# Patient Record
Sex: MALE | Race: WHITE | NOT HISPANIC OR LATINO | Employment: FULL TIME | URBAN - METROPOLITAN AREA
[De-identification: names, ages, dates, MRNs, and addresses within clinical notes are randomized per-mention and may not be internally consistent; named-entity substitution may affect disease eponyms.]

---

## 2021-12-26 ENCOUNTER — APPOINTMENT (OUTPATIENT)
Dept: GENERAL RADIOLOGY | Facility: CLINIC | Age: 51
End: 2021-12-26
Attending: PHYSICIAN ASSISTANT
Payer: COMMERCIAL

## 2021-12-26 ENCOUNTER — HOSPITAL ENCOUNTER (INPATIENT)
Facility: CLINIC | Age: 51
LOS: 3 days | Discharge: HOME HEALTH | End: 2021-12-29
Attending: ORTHOPAEDIC SURGERY | Admitting: ORTHOPAEDIC SURGERY
Payer: COMMERCIAL

## 2021-12-26 DIAGNOSIS — T84.50XD INFECTION OF PROSTHETIC JOINT, SUBSEQUENT ENCOUNTER: ICD-10-CM

## 2021-12-26 DIAGNOSIS — B95.61 STAPHYLOCOCCUS AUREUS BACTEREMIA: ICD-10-CM

## 2021-12-26 DIAGNOSIS — T81.42XA INFECTION OF DEEP INCISIONAL SURGICAL SITE AFTER PROCEDURE, INITIAL ENCOUNTER: Primary | ICD-10-CM

## 2021-12-26 DIAGNOSIS — R78.81 BACTEREMIA: ICD-10-CM

## 2021-12-26 DIAGNOSIS — R78.81 STAPHYLOCOCCUS AUREUS BACTEREMIA: ICD-10-CM

## 2021-12-26 LAB
ANION GAP SERPL CALCULATED.3IONS-SCNC: 4 MMOL/L (ref 3–14)
BUN SERPL-MCNC: 14 MG/DL (ref 7–30)
CALCIUM SERPL-MCNC: 8.5 MG/DL (ref 8.5–10.1)
CHLORIDE BLD-SCNC: 106 MMOL/L (ref 94–109)
CO2 SERPL-SCNC: 25 MMOL/L (ref 20–32)
CREAT SERPL-MCNC: 0.87 MG/DL (ref 0.66–1.25)
CRP SERPL-MCNC: 154 MG/L (ref 0–8)
ERYTHROCYTE [DISTWIDTH] IN BLOOD BY AUTOMATED COUNT: 12.5 % (ref 10–15)
ERYTHROCYTE [SEDIMENTATION RATE] IN BLOOD BY WESTERGREN METHOD: 67 MM/HR (ref 0–20)
GFR SERPL CREATININE-BSD FRML MDRD: >90 ML/MIN/1.73M2
GLUCOSE BLD-MCNC: 112 MG/DL (ref 70–99)
HCT VFR BLD AUTO: 36.1 % (ref 40–53)
HGB BLD-MCNC: 11.9 G/DL (ref 13.3–17.7)
INR PPP: 1.14 (ref 0.85–1.15)
LACTATE SERPL-SCNC: 1.1 MMOL/L (ref 0.7–2)
MCH RBC QN AUTO: 29.2 PG (ref 26.5–33)
MCHC RBC AUTO-ENTMCNC: 33 G/DL (ref 31.5–36.5)
MCV RBC AUTO: 89 FL (ref 78–100)
PLATELET # BLD AUTO: 226 10E3/UL (ref 150–450)
POTASSIUM BLD-SCNC: 4 MMOL/L (ref 3.4–5.3)
RBC # BLD AUTO: 4.07 10E6/UL (ref 4.4–5.9)
SARS-COV-2 RNA RESP QL NAA+PROBE: NEGATIVE
SODIUM SERPL-SCNC: 135 MMOL/L (ref 133–144)
WBC # BLD AUTO: 10.6 10E3/UL (ref 4–11)

## 2021-12-26 PROCEDURE — 71045 X-RAY EXAM CHEST 1 VIEW: CPT

## 2021-12-26 PROCEDURE — 85652 RBC SED RATE AUTOMATED: CPT | Performed by: PHYSICIAN ASSISTANT

## 2021-12-26 PROCEDURE — 85027 COMPLETE CBC AUTOMATED: CPT | Performed by: PHYSICIAN ASSISTANT

## 2021-12-26 PROCEDURE — 120N000001 HC R&B MED SURG/OB

## 2021-12-26 PROCEDURE — 87040 BLOOD CULTURE FOR BACTERIA: CPT | Performed by: PHYSICIAN ASSISTANT

## 2021-12-26 PROCEDURE — 250N000011 HC RX IP 250 OP 636: Performed by: ORTHOPAEDIC SURGERY

## 2021-12-26 PROCEDURE — 86140 C-REACTIVE PROTEIN: CPT | Performed by: PHYSICIAN ASSISTANT

## 2021-12-26 PROCEDURE — 99207 PR CONSULT E&M CHANGED TO SUBSEQUENT LEVEL: CPT | Performed by: PHYSICIAN ASSISTANT

## 2021-12-26 PROCEDURE — 80048 BASIC METABOLIC PNL TOTAL CA: CPT | Performed by: PHYSICIAN ASSISTANT

## 2021-12-26 PROCEDURE — 83605 ASSAY OF LACTIC ACID: CPT | Performed by: PHYSICIAN ASSISTANT

## 2021-12-26 PROCEDURE — 85610 PROTHROMBIN TIME: CPT | Performed by: PHYSICIAN ASSISTANT

## 2021-12-26 PROCEDURE — 99232 SBSQ HOSP IP/OBS MODERATE 35: CPT | Performed by: PHYSICIAN ASSISTANT

## 2021-12-26 PROCEDURE — 258N000003 HC RX IP 258 OP 636: Performed by: ORTHOPAEDIC SURGERY

## 2021-12-26 PROCEDURE — 87635 SARS-COV-2 COVID-19 AMP PRB: CPT | Performed by: PHYSICIAN ASSISTANT

## 2021-12-26 PROCEDURE — 93005 ELECTROCARDIOGRAM TRACING: CPT

## 2021-12-26 PROCEDURE — 36415 COLL VENOUS BLD VENIPUNCTURE: CPT | Performed by: PHYSICIAN ASSISTANT

## 2021-12-26 PROCEDURE — 250N000013 HC RX MED GY IP 250 OP 250 PS 637: Performed by: ORTHOPAEDIC SURGERY

## 2021-12-26 RX ORDER — ONDANSETRON 4 MG/1
4 TABLET, ORALLY DISINTEGRATING ORAL EVERY 6 HOURS PRN
Status: DISCONTINUED | OUTPATIENT
Start: 2021-12-26 | End: 2021-12-27

## 2021-12-26 RX ORDER — ONDANSETRON 2 MG/ML
4 INJECTION INTRAMUSCULAR; INTRAVENOUS EVERY 6 HOURS PRN
Status: DISCONTINUED | OUTPATIENT
Start: 2021-12-26 | End: 2021-12-27

## 2021-12-26 RX ORDER — CEFAZOLIN SODIUM 1 G/3ML
1 INJECTION, POWDER, FOR SOLUTION INTRAMUSCULAR; INTRAVENOUS EVERY 8 HOURS
Status: DISCONTINUED | OUTPATIENT
Start: 2021-12-26 | End: 2021-12-26

## 2021-12-26 RX ORDER — PROCHLORPERAZINE MALEATE 10 MG
10 TABLET ORAL EVERY 6 HOURS PRN
Status: DISCONTINUED | OUTPATIENT
Start: 2021-12-26 | End: 2021-12-27

## 2021-12-26 RX ORDER — ATORVASTATIN CALCIUM 40 MG/1
40 TABLET, FILM COATED ORAL DAILY
COMMUNITY
Start: 2021-02-17

## 2021-12-26 RX ORDER — ACETAMINOPHEN 325 MG/1
650 TABLET ORAL EVERY 4 HOURS PRN
Status: DISCONTINUED | OUTPATIENT
Start: 2021-12-26 | End: 2021-12-27

## 2021-12-26 RX ORDER — CHLORAL HYDRATE 500 MG
2000 CAPSULE ORAL DAILY
COMMUNITY

## 2021-12-26 RX ORDER — ASPIRIN 81 MG/1
325 TABLET, CHEWABLE ORAL DAILY
Status: ON HOLD | COMMUNITY
End: 2021-12-29

## 2021-12-26 RX ORDER — MELOXICAM 15 MG/1
15 TABLET ORAL DAILY
Status: ON HOLD | COMMUNITY
Start: 2021-04-19 | End: 2021-12-26

## 2021-12-26 RX ORDER — PROCHLORPERAZINE 25 MG
25 SUPPOSITORY, RECTAL RECTAL EVERY 12 HOURS PRN
Status: DISCONTINUED | OUTPATIENT
Start: 2021-12-26 | End: 2021-12-27

## 2021-12-26 RX ORDER — CEFUROXIME AXETIL 500 MG/1
500 TABLET ORAL 2 TIMES DAILY
Status: ON HOLD | COMMUNITY
Start: 2021-12-25 | End: 2021-12-29

## 2021-12-26 RX ADMIN — VANCOMYCIN HYDROCHLORIDE 2500 MG: 5 INJECTION, POWDER, LYOPHILIZED, FOR SOLUTION INTRAVENOUS at 22:42

## 2021-12-26 RX ADMIN — ACETAMINOPHEN 650 MG: 325 TABLET, FILM COATED ORAL at 19:23

## 2021-12-26 ASSESSMENT — ACTIVITIES OF DAILY LIVING (ADL)
ADLS_ACUITY_SCORE: 12

## 2021-12-26 ASSESSMENT — MIFFLIN-ST. JEOR: SCORE: 2227.5

## 2021-12-26 NOTE — PROGRESS NOTES
The patient is a 51-year-old male who is 6 weeks status post right total hip arthroplasty with Dr. Eric Wang.   He has been recovering exceptionally well up until 12/24.  That day he developed fevers and chills, as well as rigors.  This persisted into the night, and he noted that when he tried to get up in the middle the night he had new onset right hip pain (starting several hours after onset of fevers and chills).  The symptoms brought him to the emergency department at [facility] yesterday.  At that time labs were done, with the following results reported to me via phone:    Negative Covid test  White blood cell count 15  CRP 4  CT right hip: No significant effusion present, no fractures evident    No primary site of infection was determined while in the ED.    The patient was given a dose of IV antibiotics while in the emergency department, and was discharged on an empiric oral cephalosporin with plans for close follow-up with Dr. Wang's team early this coming week.    Blood cultures had been drawn as well from 2 different sites while the patient was in the emergency department.  I was contacted this afternoon and notified that both of those blood cultures had turned positive today with gram-positive cocci, speciation pending.    I called and discussed with the patient.  He noted that since the antibiotics has been started his fever had been staying below 100  F, and the hip was not as painful.  However he still noted that there was pain present in the hip, which had not been the case prior to the onset of these fevers and chills on 12/24.    Given the concern that the patient may have seeded his right total hip arthroplasty from bacteremia of unknown source, I advised the patient it would be best to bring him in for IV antibiotics and a possible return to the operating room to washout the hip to prevent biofilm formation.  The patient agreed with this, and a bed was identified at [place]  [facility].  The patient was subsequently admitted for this.    I spoke with Dr. Eric Wang this evening, who is planning to see the patient in the early hours tomorrow morning.  He has asked for an interventional radiology aspiration of the right hip, with the cell count results dictating the need for return to the operating room for irrigation and debridement with possible head and liner exchange.    We will ask for hospitalist consult this evening to confirm clearance for the operating room, as well as advised on empiric IV antibiotic regimen to be initiated this evening.      Jimi Edwards MD  [facility]

## 2021-12-27 ENCOUNTER — HOME INFUSION (PRE-WILLOW HOME INFUSION) (OUTPATIENT)
Dept: PHARMACY | Facility: CLINIC | Age: 51
End: 2021-12-27
Payer: COMMERCIAL

## 2021-12-27 ENCOUNTER — APPOINTMENT (OUTPATIENT)
Dept: GENERAL RADIOLOGY | Facility: CLINIC | Age: 51
End: 2021-12-27
Attending: ORTHOPAEDIC SURGERY
Payer: COMMERCIAL

## 2021-12-27 ENCOUNTER — ANESTHESIA (OUTPATIENT)
Dept: SURGERY | Facility: CLINIC | Age: 51
End: 2021-12-27
Payer: COMMERCIAL

## 2021-12-27 ENCOUNTER — ANESTHESIA EVENT (OUTPATIENT)
Dept: SURGERY | Facility: CLINIC | Age: 51
End: 2021-12-27
Payer: COMMERCIAL

## 2021-12-27 LAB
ABO/RH(D): NORMAL
ANTIBODY SCREEN: NEGATIVE
CREAT SERPL-MCNC: 0.86 MG/DL (ref 0.66–1.25)
GFR SERPL CREATININE-BSD FRML MDRD: >90 ML/MIN/1.73M2
SPECIMEN EXPIRATION DATE: NORMAL

## 2021-12-27 PROCEDURE — 87070 CULTURE OTHR SPECIMN AEROBIC: CPT | Performed by: ORTHOPAEDIC SURGERY

## 2021-12-27 PROCEDURE — 999N000141 HC STATISTIC PRE-PROCEDURE NURSING ASSESSMENT: Performed by: ORTHOPAEDIC SURGERY

## 2021-12-27 PROCEDURE — 999N000179 XR SURGERY CARM FLUORO LESS THAN 5 MIN W STILLS

## 2021-12-27 PROCEDURE — 250N000013 HC RX MED GY IP 250 OP 250 PS 637: Performed by: STUDENT IN AN ORGANIZED HEALTH CARE EDUCATION/TRAINING PROGRAM

## 2021-12-27 PROCEDURE — 0SP909Z REMOVAL OF LINER FROM RIGHT HIP JOINT, OPEN APPROACH: ICD-10-PCS | Performed by: ORTHOPAEDIC SURGERY

## 2021-12-27 PROCEDURE — C1776 JOINT DEVICE (IMPLANTABLE): HCPCS | Performed by: ORTHOPAEDIC SURGERY

## 2021-12-27 PROCEDURE — 999N000063 XR PELVIS AD HIP PORTABLE RIGHT 1 VIEW

## 2021-12-27 PROCEDURE — 272N000001 HC OR GENERAL SUPPLY STERILE: Performed by: ORTHOPAEDIC SURGERY

## 2021-12-27 PROCEDURE — 250N000013 HC RX MED GY IP 250 OP 250 PS 637: Performed by: ORTHOPAEDIC SURGERY

## 2021-12-27 PROCEDURE — 0SPR0JZ REMOVAL OF SYNTHETIC SUBSTITUTE FROM RIGHT HIP JOINT, FEMORAL SURFACE, OPEN APPROACH: ICD-10-PCS | Performed by: ORTHOPAEDIC SURGERY

## 2021-12-27 PROCEDURE — 258N000001 HC RX 258: Performed by: ORTHOPAEDIC SURGERY

## 2021-12-27 PROCEDURE — 0SRR03A REPLACEMENT OF RIGHT HIP JOINT, FEMORAL SURFACE WITH CERAMIC SYNTHETIC SUBSTITUTE, UNCEMENTED, OPEN APPROACH: ICD-10-PCS | Performed by: ORTHOPAEDIC SURGERY

## 2021-12-27 PROCEDURE — 370N000017 HC ANESTHESIA TECHNICAL FEE, PER MIN: Performed by: ORTHOPAEDIC SURGERY

## 2021-12-27 PROCEDURE — 250N000009 HC RX 250: Performed by: NURSE ANESTHETIST, CERTIFIED REGISTERED

## 2021-12-27 PROCEDURE — 258N000003 HC RX IP 258 OP 636: Performed by: ORTHOPAEDIC SURGERY

## 2021-12-27 PROCEDURE — 250N000011 HC RX IP 250 OP 636: Performed by: STUDENT IN AN ORGANIZED HEALTH CARE EDUCATION/TRAINING PROGRAM

## 2021-12-27 PROCEDURE — 77002 NEEDLE LOCALIZATION BY XRAY: CPT

## 2021-12-27 PROCEDURE — 89050 BODY FLUID CELL COUNT: CPT | Performed by: ORTHOPAEDIC SURGERY

## 2021-12-27 PROCEDURE — 250N000009 HC RX 250: Performed by: ORTHOPAEDIC SURGERY

## 2021-12-27 PROCEDURE — 0S993ZZ DRAINAGE OF RIGHT HIP JOINT, PERCUTANEOUS APPROACH: ICD-10-PCS | Performed by: RADIOLOGY

## 2021-12-27 PROCEDURE — 360N000085 HC SURGERY LEVEL 5 W/ FLUORO, PER MIN: Performed by: ORTHOPAEDIC SURGERY

## 2021-12-27 PROCEDURE — 250N000011 HC RX IP 250 OP 636: Performed by: NURSE ANESTHETIST, CERTIFIED REGISTERED

## 2021-12-27 PROCEDURE — 250N000011 HC RX IP 250 OP 636: Performed by: ORTHOPAEDIC SURGERY

## 2021-12-27 PROCEDURE — 82565 ASSAY OF CREATININE: CPT | Performed by: STUDENT IN AN ORGANIZED HEALTH CARE EDUCATION/TRAINING PROGRAM

## 2021-12-27 PROCEDURE — 710N000009 HC RECOVERY PHASE 1, LEVEL 1, PER MIN: Performed by: ORTHOPAEDIC SURGERY

## 2021-12-27 PROCEDURE — 36415 COLL VENOUS BLD VENIPUNCTURE: CPT | Performed by: STUDENT IN AN ORGANIZED HEALTH CARE EDUCATION/TRAINING PROGRAM

## 2021-12-27 PROCEDURE — 250N000025 HC SEVOFLURANE, PER MIN: Performed by: ORTHOPAEDIC SURGERY

## 2021-12-27 PROCEDURE — 86901 BLOOD TYPING SEROLOGIC RH(D): CPT | Performed by: ANESTHESIOLOGY

## 2021-12-27 PROCEDURE — 87075 CULTR BACTERIA EXCEPT BLOOD: CPT | Performed by: ORTHOPAEDIC SURGERY

## 2021-12-27 PROCEDURE — 0SUA09Z SUPPLEMENT RIGHT HIP JOINT, ACETABULAR SURFACE WITH LINER, OPEN APPROACH: ICD-10-PCS | Performed by: ORTHOPAEDIC SURGERY

## 2021-12-27 PROCEDURE — 86850 RBC ANTIBODY SCREEN: CPT | Performed by: ANESTHESIOLOGY

## 2021-12-27 PROCEDURE — 250N000013 HC RX MED GY IP 250 OP 250 PS 637: Performed by: INTERNAL MEDICINE

## 2021-12-27 PROCEDURE — 99232 SBSQ HOSP IP/OBS MODERATE 35: CPT | Performed by: INTERNAL MEDICINE

## 2021-12-27 PROCEDURE — 258N000003 HC RX IP 258 OP 636: Performed by: ANESTHESIOLOGY

## 2021-12-27 PROCEDURE — 120N000001 HC R&B MED SURG/OB

## 2021-12-27 DEVICE — PINNACLE HIP SOLUTIONS ALTRX POLYETHYLENE ACETABULAR LINER NEUTRAL 36MM ID 62MM OD
Type: IMPLANTABLE DEVICE | Site: HIP | Status: NON-FUNCTIONAL
Brand: PINNACLE ALTRX
Removed: 2022-09-19

## 2021-12-27 DEVICE — BIOLOX DELTA TS CERAMIC FEMORAL HEAD 12/14 TAPER REVISION DIAMETER 36MM +1.5
Type: IMPLANTABLE DEVICE | Site: HIP | Status: NON-FUNCTIONAL
Brand: BIOLOX DELTA
Removed: 2022-09-19

## 2021-12-27 RX ORDER — PROCHLORPERAZINE MALEATE 10 MG
10 TABLET ORAL EVERY 6 HOURS PRN
Status: DISCONTINUED | OUTPATIENT
Start: 2021-12-27 | End: 2021-12-29 | Stop reason: HOSPADM

## 2021-12-27 RX ORDER — OXYCODONE HYDROCHLORIDE 5 MG/1
10 TABLET ORAL EVERY 4 HOURS PRN
Status: DISCONTINUED | OUTPATIENT
Start: 2021-12-27 | End: 2021-12-29 | Stop reason: HOSPADM

## 2021-12-27 RX ORDER — MAGNESIUM HYDROXIDE 1200 MG/15ML
LIQUID ORAL PRN
Status: DISCONTINUED | OUTPATIENT
Start: 2021-12-27 | End: 2021-12-27 | Stop reason: HOSPADM

## 2021-12-27 RX ORDER — LIDOCAINE HYDROCHLORIDE 20 MG/ML
INJECTION, SOLUTION INFILTRATION; PERINEURAL PRN
Status: DISCONTINUED | OUTPATIENT
Start: 2021-12-27 | End: 2021-12-27

## 2021-12-27 RX ORDER — HYDROMORPHONE HCL IN WATER/PF 6 MG/30 ML
0.4 PATIENT CONTROLLED ANALGESIA SYRINGE INTRAVENOUS
Status: DISCONTINUED | OUTPATIENT
Start: 2021-12-27 | End: 2021-12-29 | Stop reason: HOSPADM

## 2021-12-27 RX ORDER — MEPERIDINE HYDROCHLORIDE 25 MG/ML
12.5 INJECTION INTRAMUSCULAR; INTRAVENOUS; SUBCUTANEOUS EVERY 5 MIN PRN
Status: DISCONTINUED | OUTPATIENT
Start: 2021-12-27 | End: 2021-12-27

## 2021-12-27 RX ORDER — LIDOCAINE 40 MG/G
CREAM TOPICAL
Status: DISCONTINUED | OUTPATIENT
Start: 2021-12-27 | End: 2021-12-29 | Stop reason: HOSPADM

## 2021-12-27 RX ORDER — SENNOSIDES 8.6 MG
1-2 TABLET ORAL 2 TIMES DAILY PRN
Status: DISCONTINUED | OUTPATIENT
Start: 2021-12-27 | End: 2021-12-27

## 2021-12-27 RX ORDER — NALOXONE HYDROCHLORIDE 0.4 MG/ML
0.2 INJECTION, SOLUTION INTRAMUSCULAR; INTRAVENOUS; SUBCUTANEOUS
Status: DISCONTINUED | OUTPATIENT
Start: 2021-12-27 | End: 2021-12-29 | Stop reason: HOSPADM

## 2021-12-27 RX ORDER — BISACODYL 10 MG
10 SUPPOSITORY, RECTAL RECTAL DAILY PRN
Status: DISCONTINUED | OUTPATIENT
Start: 2021-12-27 | End: 2021-12-27

## 2021-12-27 RX ORDER — CEFAZOLIN SODIUM IN 0.9 % NACL 3 G/100 ML
3 INTRAVENOUS SOLUTION, PIGGYBACK (ML) INTRAVENOUS
Status: COMPLETED | OUTPATIENT
Start: 2021-12-27 | End: 2021-12-27

## 2021-12-27 RX ORDER — TRANEXAMIC ACID 10 MG/ML
1 INJECTION, SOLUTION INTRAVENOUS ONCE
Status: COMPLETED | OUTPATIENT
Start: 2021-12-27 | End: 2021-12-27

## 2021-12-27 RX ORDER — ONDANSETRON 2 MG/ML
4 INJECTION INTRAMUSCULAR; INTRAVENOUS EVERY 30 MIN PRN
Status: DISCONTINUED | OUTPATIENT
Start: 2021-12-27 | End: 2021-12-27

## 2021-12-27 RX ORDER — SODIUM CHLORIDE, SODIUM LACTATE, POTASSIUM CHLORIDE, CALCIUM CHLORIDE 600; 310; 30; 20 MG/100ML; MG/100ML; MG/100ML; MG/100ML
INJECTION, SOLUTION INTRAVENOUS CONTINUOUS
Status: DISCONTINUED | OUTPATIENT
Start: 2021-12-27 | End: 2021-12-27

## 2021-12-27 RX ORDER — ONDANSETRON 4 MG/1
4 TABLET, ORALLY DISINTEGRATING ORAL EVERY 6 HOURS PRN
Status: DISCONTINUED | OUTPATIENT
Start: 2021-12-27 | End: 2021-12-29 | Stop reason: HOSPADM

## 2021-12-27 RX ORDER — HYDROMORPHONE HCL IN WATER/PF 6 MG/30 ML
0.2 PATIENT CONTROLLED ANALGESIA SYRINGE INTRAVENOUS
Status: DISCONTINUED | OUTPATIENT
Start: 2021-12-27 | End: 2021-12-29 | Stop reason: HOSPADM

## 2021-12-27 RX ORDER — NALOXONE HYDROCHLORIDE 0.4 MG/ML
0.4 INJECTION, SOLUTION INTRAMUSCULAR; INTRAVENOUS; SUBCUTANEOUS
Status: DISCONTINUED | OUTPATIENT
Start: 2021-12-27 | End: 2021-12-29 | Stop reason: HOSPADM

## 2021-12-27 RX ORDER — POLYETHYLENE GLYCOL 3350 17 G/17G
17 POWDER, FOR SOLUTION ORAL 2 TIMES DAILY PRN
Status: DISCONTINUED | OUTPATIENT
Start: 2021-12-27 | End: 2021-12-27

## 2021-12-27 RX ORDER — ACETAMINOPHEN 325 MG/1
975 TABLET ORAL EVERY 8 HOURS SCHEDULED
Status: DISCONTINUED | OUTPATIENT
Start: 2021-12-27 | End: 2021-12-29 | Stop reason: HOSPADM

## 2021-12-27 RX ORDER — KETOROLAC TROMETHAMINE 15 MG/ML
15 INJECTION, SOLUTION INTRAMUSCULAR; INTRAVENOUS EVERY 6 HOURS
Status: COMPLETED | OUTPATIENT
Start: 2021-12-28 | End: 2021-12-28

## 2021-12-27 RX ORDER — FENTANYL CITRATE 0.05 MG/ML
50 INJECTION, SOLUTION INTRAMUSCULAR; INTRAVENOUS EVERY 5 MIN PRN
Status: DISCONTINUED | OUTPATIENT
Start: 2021-12-27 | End: 2021-12-27

## 2021-12-27 RX ORDER — BISACODYL 10 MG
10 SUPPOSITORY, RECTAL RECTAL DAILY PRN
Status: DISCONTINUED | OUTPATIENT
Start: 2021-12-27 | End: 2021-12-29 | Stop reason: HOSPADM

## 2021-12-27 RX ORDER — ALBUTEROL SULFATE 0.83 MG/ML
2.5 SOLUTION RESPIRATORY (INHALATION) EVERY 4 HOURS PRN
Status: DISCONTINUED | OUTPATIENT
Start: 2021-12-27 | End: 2021-12-27

## 2021-12-27 RX ORDER — GLYCOPYRROLATE 0.2 MG/ML
INJECTION, SOLUTION INTRAMUSCULAR; INTRAVENOUS PRN
Status: DISCONTINUED | OUTPATIENT
Start: 2021-12-27 | End: 2021-12-27

## 2021-12-27 RX ORDER — NEOSTIGMINE METHYLSULFATE 1 MG/ML
VIAL (ML) INJECTION PRN
Status: DISCONTINUED | OUTPATIENT
Start: 2021-12-27 | End: 2021-12-27

## 2021-12-27 RX ORDER — FENTANYL CITRATE 50 UG/ML
INJECTION, SOLUTION INTRAMUSCULAR; INTRAVENOUS PRN
Status: DISCONTINUED | OUTPATIENT
Start: 2021-12-27 | End: 2021-12-27

## 2021-12-27 RX ORDER — VANCOMYCIN HYDROCHLORIDE 1 G/20ML
INJECTION, POWDER, LYOPHILIZED, FOR SOLUTION INTRAVENOUS PRN
Status: DISCONTINUED | OUTPATIENT
Start: 2021-12-27 | End: 2021-12-27 | Stop reason: HOSPADM

## 2021-12-27 RX ORDER — PROPOFOL 10 MG/ML
INJECTION, EMULSION INTRAVENOUS PRN
Status: DISCONTINUED | OUTPATIENT
Start: 2021-12-27 | End: 2021-12-27

## 2021-12-27 RX ORDER — HYDROMORPHONE HCL IN WATER/PF 6 MG/30 ML
0.4 PATIENT CONTROLLED ANALGESIA SYRINGE INTRAVENOUS EVERY 5 MIN PRN
Status: DISCONTINUED | OUTPATIENT
Start: 2021-12-27 | End: 2021-12-27

## 2021-12-27 RX ORDER — ONDANSETRON 2 MG/ML
4 INJECTION INTRAMUSCULAR; INTRAVENOUS
Status: DISCONTINUED | OUTPATIENT
Start: 2021-12-27 | End: 2021-12-27 | Stop reason: HOSPADM

## 2021-12-27 RX ORDER — ACETAMINOPHEN 325 MG/1
650 TABLET ORAL EVERY 4 HOURS PRN
Status: DISCONTINUED | OUTPATIENT
Start: 2021-12-30 | End: 2021-12-29 | Stop reason: HOSPADM

## 2021-12-27 RX ORDER — TRANEXAMIC ACID 650 MG/1
1950 TABLET ORAL ONCE
Status: DISCONTINUED | OUTPATIENT
Start: 2021-12-27 | End: 2021-12-27 | Stop reason: DRUGHIGH

## 2021-12-27 RX ORDER — OXYCODONE HYDROCHLORIDE 5 MG/1
5 TABLET ORAL EVERY 4 HOURS PRN
Status: DISCONTINUED | OUTPATIENT
Start: 2021-12-27 | End: 2021-12-29 | Stop reason: HOSPADM

## 2021-12-27 RX ORDER — OXYCODONE HYDROCHLORIDE 5 MG/1
5 TABLET ORAL EVERY 4 HOURS PRN
Status: DISCONTINUED | OUTPATIENT
Start: 2021-12-27 | End: 2021-12-27

## 2021-12-27 RX ORDER — ONDANSETRON 2 MG/ML
4 INJECTION INTRAMUSCULAR; INTRAVENOUS EVERY 6 HOURS PRN
Status: DISCONTINUED | OUTPATIENT
Start: 2021-12-27 | End: 2021-12-29 | Stop reason: HOSPADM

## 2021-12-27 RX ORDER — CELECOXIB 200 MG/1
400 CAPSULE ORAL ONCE
Status: COMPLETED | OUTPATIENT
Start: 2021-12-27 | End: 2021-12-27

## 2021-12-27 RX ORDER — CALCIUM CARBONATE 500 MG/1
500 TABLET, CHEWABLE ORAL 4 TIMES DAILY PRN
Status: DISCONTINUED | OUTPATIENT
Start: 2021-12-27 | End: 2021-12-29 | Stop reason: HOSPADM

## 2021-12-27 RX ORDER — HYDROXYZINE HYDROCHLORIDE 25 MG/1
25 TABLET, FILM COATED ORAL EVERY 6 HOURS PRN
Status: DISCONTINUED | OUTPATIENT
Start: 2021-12-27 | End: 2021-12-29 | Stop reason: HOSPADM

## 2021-12-27 RX ORDER — POLYETHYLENE GLYCOL 3350 17 G/17G
17 POWDER, FOR SOLUTION ORAL DAILY
Status: DISCONTINUED | OUTPATIENT
Start: 2021-12-28 | End: 2021-12-29 | Stop reason: HOSPADM

## 2021-12-27 RX ORDER — ONDANSETRON 2 MG/ML
INJECTION INTRAMUSCULAR; INTRAVENOUS PRN
Status: DISCONTINUED | OUTPATIENT
Start: 2021-12-27 | End: 2021-12-27

## 2021-12-27 RX ORDER — ONDANSETRON 4 MG/1
4 TABLET, ORALLY DISINTEGRATING ORAL EVERY 30 MIN PRN
Status: DISCONTINUED | OUTPATIENT
Start: 2021-12-27 | End: 2021-12-27

## 2021-12-27 RX ORDER — SODIUM CHLORIDE, SODIUM LACTATE, POTASSIUM CHLORIDE, CALCIUM CHLORIDE 600; 310; 30; 20 MG/100ML; MG/100ML; MG/100ML; MG/100ML
INJECTION, SOLUTION INTRAVENOUS CONTINUOUS
Status: DISCONTINUED | OUTPATIENT
Start: 2021-12-27 | End: 2021-12-27 | Stop reason: HOSPADM

## 2021-12-27 RX ORDER — ACETAMINOPHEN 325 MG/1
975 TABLET ORAL ONCE
Status: DISCONTINUED | OUTPATIENT
Start: 2021-12-27 | End: 2021-12-27 | Stop reason: HOSPADM

## 2021-12-27 RX ORDER — SODIUM CHLORIDE, SODIUM LACTATE, POTASSIUM CHLORIDE, CALCIUM CHLORIDE 600; 310; 30; 20 MG/100ML; MG/100ML; MG/100ML; MG/100ML
INJECTION, SOLUTION INTRAVENOUS CONTINUOUS
Status: DISCONTINUED | OUTPATIENT
Start: 2021-12-27 | End: 2021-12-29 | Stop reason: HOSPADM

## 2021-12-27 RX ORDER — METHOCARBAMOL 750 MG/1
750 TABLET, FILM COATED ORAL EVERY 6 HOURS PRN
Status: DISCONTINUED | OUTPATIENT
Start: 2021-12-27 | End: 2021-12-29 | Stop reason: HOSPADM

## 2021-12-27 RX ORDER — AMOXICILLIN 250 MG
1 CAPSULE ORAL 2 TIMES DAILY
Status: DISCONTINUED | OUTPATIENT
Start: 2021-12-27 | End: 2021-12-29 | Stop reason: HOSPADM

## 2021-12-27 RX ORDER — DIPHENHYDRAMINE HCL 25 MG
25 CAPSULE ORAL EVERY 6 HOURS PRN
Status: DISCONTINUED | OUTPATIENT
Start: 2021-12-27 | End: 2021-12-29 | Stop reason: HOSPADM

## 2021-12-27 RX ORDER — CEFAZOLIN SODIUM IN 0.9 % NACL 3 G/100 ML
3 INTRAVENOUS SOLUTION, PIGGYBACK (ML) INTRAVENOUS SEE ADMIN INSTRUCTIONS
Status: DISCONTINUED | OUTPATIENT
Start: 2021-12-27 | End: 2021-12-27 | Stop reason: HOSPADM

## 2021-12-27 RX ORDER — PROPOFOL 10 MG/ML
INJECTION, EMULSION INTRAVENOUS CONTINUOUS PRN
Status: DISCONTINUED | OUTPATIENT
Start: 2021-12-27 | End: 2021-12-27

## 2021-12-27 RX ORDER — FENTANYL CITRATE 50 UG/ML
50 INJECTION, SOLUTION INTRAMUSCULAR; INTRAVENOUS
Status: DISCONTINUED | OUTPATIENT
Start: 2021-12-27 | End: 2021-12-27 | Stop reason: HOSPADM

## 2021-12-27 RX ADMIN — TRANEXAMIC ACID 1 G: 10 INJECTION, SOLUTION INTRAVENOUS at 13:36

## 2021-12-27 RX ADMIN — Medication 8 MCG: at 15:08

## 2021-12-27 RX ADMIN — ONDANSETRON 4 MG: 2 INJECTION INTRAMUSCULAR; INTRAVENOUS at 15:51

## 2021-12-27 RX ADMIN — SENNOSIDES 2 TABLET: 8.6 TABLET, FILM COATED ORAL at 10:05

## 2021-12-27 RX ADMIN — SODIUM CHLORIDE, POTASSIUM CHLORIDE, SODIUM LACTATE AND CALCIUM CHLORIDE: 600; 310; 30; 20 INJECTION, SOLUTION INTRAVENOUS at 13:20

## 2021-12-27 RX ADMIN — LIDOCAINE HYDROCHLORIDE 5.5 ML: 10 INJECTION, SOLUTION EPIDURAL; INFILTRATION; INTRACAUDAL; PERINEURAL at 09:37

## 2021-12-27 RX ADMIN — Medication 12 MCG: at 14:52

## 2021-12-27 RX ADMIN — ROCURONIUM BROMIDE 20 MG: 50 INJECTION, SOLUTION INTRAVENOUS at 14:47

## 2021-12-27 RX ADMIN — SODIUM CHLORIDE, POTASSIUM CHLORIDE, SODIUM LACTATE AND CALCIUM CHLORIDE: 600; 310; 30; 20 INJECTION, SOLUTION INTRAVENOUS at 19:01

## 2021-12-27 RX ADMIN — Medication 3 G: at 13:14

## 2021-12-27 RX ADMIN — ROCURONIUM BROMIDE 50 MG: 50 INJECTION, SOLUTION INTRAVENOUS at 13:27

## 2021-12-27 RX ADMIN — ACETAMINOPHEN 650 MG: 325 TABLET, FILM COATED ORAL at 00:59

## 2021-12-27 RX ADMIN — ACETAMINOPHEN 650 MG: 325 TABLET, FILM COATED ORAL at 10:05

## 2021-12-27 RX ADMIN — HYDROMORPHONE HYDROCHLORIDE 0.5 MG: 1 INJECTION, SOLUTION INTRAMUSCULAR; INTRAVENOUS; SUBCUTANEOUS at 16:25

## 2021-12-27 RX ADMIN — MIDAZOLAM 2 MG: 1 INJECTION INTRAMUSCULAR; INTRAVENOUS at 13:22

## 2021-12-27 RX ADMIN — PROPOFOL 200 MG: 10 INJECTION, EMULSION INTRAVENOUS at 13:25

## 2021-12-27 RX ADMIN — NEOSTIGMINE METHYLSULFATE 5 MG: 1 INJECTION, SOLUTION INTRAVENOUS at 16:23

## 2021-12-27 RX ADMIN — GLYCOPYRROLATE 1 MG: 0.2 INJECTION, SOLUTION INTRAMUSCULAR; INTRAVENOUS at 16:23

## 2021-12-27 RX ADMIN — ACETAMINOPHEN 975 MG: 325 TABLET, FILM COATED ORAL at 22:04

## 2021-12-27 RX ADMIN — HYDROMORPHONE HYDROCHLORIDE 0.5 MG: 1 INJECTION, SOLUTION INTRAMUSCULAR; INTRAVENOUS; SUBCUTANEOUS at 14:07

## 2021-12-27 RX ADMIN — ACETAMINOPHEN 650 MG: 325 TABLET, FILM COATED ORAL at 05:08

## 2021-12-27 RX ADMIN — VANCOMYCIN HYDROCHLORIDE 1250 MG: 5 INJECTION, POWDER, LYOPHILIZED, FOR SOLUTION INTRAVENOUS at 22:05

## 2021-12-27 RX ADMIN — VANCOMYCIN HYDROCHLORIDE 1250 MG: 5 INJECTION, POWDER, LYOPHILIZED, FOR SOLUTION INTRAVENOUS at 10:05

## 2021-12-27 RX ADMIN — KETOROLAC TROMETHAMINE 15 MG: 15 INJECTION, SOLUTION INTRAMUSCULAR; INTRAVENOUS at 23:48

## 2021-12-27 RX ADMIN — ROCURONIUM BROMIDE 20 MG: 50 INJECTION, SOLUTION INTRAVENOUS at 14:00

## 2021-12-27 RX ADMIN — ROCURONIUM BROMIDE 30 MG: 50 INJECTION, SOLUTION INTRAVENOUS at 15:16

## 2021-12-27 RX ADMIN — TRANEXAMIC ACID 1 G: 10 INJECTION, SOLUTION INTRAVENOUS at 16:04

## 2021-12-27 RX ADMIN — HYDROMORPHONE HYDROCHLORIDE 0.5 MG: 1 INJECTION, SOLUTION INTRAMUSCULAR; INTRAVENOUS; SUBCUTANEOUS at 16:09

## 2021-12-27 RX ADMIN — PROPOFOL 30 MCG/KG/MIN: 10 INJECTION, EMULSION INTRAVENOUS at 13:32

## 2021-12-27 RX ADMIN — ROCURONIUM BROMIDE 10 MG: 50 INJECTION, SOLUTION INTRAVENOUS at 14:32

## 2021-12-27 RX ADMIN — PROPOFOL 100 MG: 10 INJECTION, EMULSION INTRAVENOUS at 13:32

## 2021-12-27 RX ADMIN — PROPOFOL 50 MG: 10 INJECTION, EMULSION INTRAVENOUS at 14:47

## 2021-12-27 RX ADMIN — LIDOCAINE HYDROCHLORIDE 100 MG: 20 INJECTION, SOLUTION INFILTRATION; PERINEURAL at 13:25

## 2021-12-27 RX ADMIN — FENTANYL CITRATE 50 MCG: 50 INJECTION, SOLUTION INTRAMUSCULAR; INTRAVENOUS at 13:25

## 2021-12-27 RX ADMIN — FENTANYL CITRATE 50 MCG: 50 INJECTION, SOLUTION INTRAMUSCULAR; INTRAVENOUS at 13:31

## 2021-12-27 RX ADMIN — PROPOFOL 30 MG: 10 INJECTION, EMULSION INTRAVENOUS at 16:26

## 2021-12-27 RX ADMIN — SENNOSIDES AND DOCUSATE SODIUM 1 TABLET: 50; 8.6 TABLET ORAL at 22:05

## 2021-12-27 RX ADMIN — CELECOXIB 400 MG: 200 CAPSULE ORAL at 12:16

## 2021-12-27 ASSESSMENT — ACTIVITIES OF DAILY LIVING (ADL)
ADLS_ACUITY_SCORE: 3
ADLS_ACUITY_SCORE: 12
ADLS_ACUITY_SCORE: 3
ADLS_ACUITY_SCORE: 12
ADLS_ACUITY_SCORE: 3
ADLS_ACUITY_SCORE: 12
ADLS_ACUITY_SCORE: 3
ADLS_ACUITY_SCORE: 3
ADLS_ACUITY_SCORE: 12
ADLS_ACUITY_SCORE: 12
ADLS_ACUITY_SCORE: 3
ADLS_ACUITY_SCORE: 12
ADLS_ACUITY_SCORE: 3
ADLS_ACUITY_SCORE: 3

## 2021-12-27 ASSESSMENT — ENCOUNTER SYMPTOMS
SEIZURES: 0
ORTHOPNEA: 0
DYSRHYTHMIAS: 0

## 2021-12-27 ASSESSMENT — LIFESTYLE VARIABLES: TOBACCO_USE: 0

## 2021-12-27 ASSESSMENT — COPD QUESTIONNAIRES: COPD: 0

## 2021-12-27 NOTE — CONSULTS
[facility]  Hospitalist Service Consultation  JoAnna K. Barthell, PA-C pager [phone]    Date of Admission:  12/26/2021  Date of Consult: 12/26/2021    Assessment & Plan   Aba Gan is a 51 year old male with pmhx HLD and right total hip arthroplasty who is a direct admission under the care of orthopedics service after presenting to outside facility with R hip pain, fevers, and chills on 12/25 found to have gram positive cocci bacteremia. Hospitalist service is consulted for empiric antibiotic recommendations and preoperative clearance.    Sepsis 2/2 gram + cocci bacteremia.  Returned positive 12/26 in 2/2 blood cultures ([facility] ED from 12/25). Fever (103.2 F), tachycardia, leukocytosis 15 at [facility]. CT right hip reportedly no significant effusion present, no fractures evident.  - Obtain blood ctx, start empiric vancomycin following lab draw.  - Change routine labs to stat.  - Appreciate ID consult regarding abx choice and duration.  - Hip aspirate ordered per ortho.  - NPO at midnight per ortho.  - Obtain EKG (premature paternal side CAD) and CXR given cough.    Dry cough.  Triple vaccinated for COVID19, received flu vaccine. Negative COVID [facility] 12/25. Mostly dry cough x 5 days, fever.  - Obtain COVID PCR and CXR as above.    HLD.  - Hold PTA ASA (1  prevention d/t FHx).  - Cont statin.    LINDSEY.  Not on treatment.    Clinically Significant Risk Factors Present on Admission              # Platelet Defect: home medication list includes an antiplatelet medication       DVT Prophylaxis: Pneumatic Compression Devices  Code Status: Full Code    Discussed over phone with ortho Dr. Edwards.  This patient was discussed with Dr. Sandra Rubio of the Hospitalist Service who agrees with current plans as outlined above.    Disposition: Expected discharge in 2-3 days once afebrile x 24 and ortho and abx plan in place.    JoAnna K. Barthell, PA-C    Aba Gan MRN# [id number]   YOB: 1970 Age: 51  year old   Primary Care Physician: No primary care provider on file. None    Requesting Physician: Jimi Edwards  Reason for Consult: antibiotic recommendations and preop eval    History provided by patient and chart review.    HPI  Aba Gan is a 51 year old male with pmhx HLD and right total hip arthroplasty who is a direct admission under the care of orthopedics service after presenting to [facility] ED with R hip pain, fevers, and chills on 12/25 found to have gram positive cocci bacteremia. Hospitalist service is consulted for empiric antibiotic recommendations and preoperative clearance.    ED labs 12/25 wbc 15, crp 4, and ct right hip reportedly no significant effusion or fractures. Patient reports being told his bursa was inflamed. Discharged with Ceftin. Today 2/2 blood ctx returned positive gram + cocci, speciatation pending.    No known hx hepatitis, IVDU, or trauma. Symptoms started 12/24.  Right hip pain fairly mild at this point. Denies ha, URI symptoms, cp, difficulty breathing, abd pain, n/v/d/c, and urinary symptoms. Dry cough x 4-5 days and one sneeze about a week ago with right anterior lower chest pain that lasted about 2 days.    Elevated temperature on admission vitals 100.3, and on recheck was 103.2.    PAST MEDICAL HISTORY  Hyperlipidemia.  Family history premature coronary artery disease on paternal side.  LINDSEY.    PAST SURGICAL HISTORY  Tonsillectomy.  Vasectomy.  Left knee partial medial meniscectomy.      HOME MEDICATIONS  Prior to Admission medications    Medication Sig Last Dose Taking? Auth Provider   atorvastatin (LIPITOR) 40 MG tablet Take 40 mg by mouth daily  Yes Unknown, Entered By History   cefuroxime (CEFTIN) 500 MG tablet Take 500 mg by mouth 2 times daily  Yes Unknown, Entered By History   meloxicam (MOBIC) 15 MG tablet Take 15 mg by mouth daily  Yes Unknown, Entered By History   aspirin (ASA) 81 MG chewable tablet Take 81 mg by mouth daily   Unknown, Entered By History   fish  oil-omega-3 fatty acids 1000 MG capsule Take 2,000 mg by mouth daily   Unknown, Entered By History       ALLERGIES  Not on File    SOCIAL HISTORY  Never smoker.  Consumes 8-10 alcoholic beverages per week.  No history illicit drug use.  Works for [employer].    FAMILY HISTORY  Father: Prostate cancer and CAD.  2 paternal uncles and a paternal grandfather with CAD (grandfather with MI in 50s).    REVIEW OF SYSTEMS  A 10 point ROS was negative other than the symptoms noted above in the HPI.    PHYSICAL EXAM  Nursing Notes Reviewed.  /87   Pulse 103   Temp (!) 103.2  F (39.6  C) (Oral)   Resp 18   SpO2 94%    General:  Appears stated age in no acute distress.  Mildly anxious.  Skin:  Warm, dry. No rashes or lesions on exposed skin.  HEENT:  Normocephalic, atraumatic. EOMs grossly intact.  Neck:  Supple.  Chest:  Breath sounds CTA. No increased work of breathing.  Cardiovascular:  RRR, no rub or murmur. No peripheral edema.  Abdomen:  Soft, overweight, non-tender, non-distended.  Musculoskeletal:  Moves all four extremities.  Right anterior hip surgical incisions appears well-healing with superficial dry flaking skin around the site.  No significant erythema, TTP, or appreciable swelling.  Distal CMS intact.  Neurological:  CN 2-12 grossly intact.    Data   Data reviewed today: EKG and CXR pending.    No lab results found in last 7 days.    Imaging:  No results found for this or any previous visit (from the past 24 hour(s)).

## 2021-12-27 NOTE — ANESTHESIA PROCEDURE NOTES
Airway       Patient location during procedure: OR ([facility] - Operating Room or Procedural Area)       Procedure Start/Stop Times: 12/27/2021 1:31 PM  Staff -        Anesthesiologist:  Andrei Rothman MD       CRNA: Mehran Palacios APRN CRNA       Performed By: CRNAIndications and Patient Condition       Indications for airway management: christy-procedural       Induction type:intravenous       Mask difficulty assessment: 2 - vent by mask + OA or adjuvant +/- NMBA    Final Airway Details       Final airway type: endotracheal airway       Successful airway: ETT - single  Endotracheal Airway Details        ETT size (mm): 8.0       Cuffed: yes       Cuff volume (mL): 10       Successful intubation technique: video laryngoscopy       VL Blade Size: Glidescope 4       Grade View of Cords: 1       Adjucts: stylet       Position: Right       Measured from: lips       Secured at (cm): 24       Bite block used: None    Post intubation assessment        Number of attempts at approach: 1       Number of other approaches attempted: 0       Secured with: pink tape and plastic tape       Ease of procedure: easy       Dentition: Intact and Unchanged

## 2021-12-27 NOTE — PHARMACY-VANCOMYCIN DOSING SERVICE
Pharmacy Vancomycin Initial Note  Date of Service 2021  Patient's  1970  51 year old, male    Indication: Bacteremia    Current estimated CrCl = Estimated Creatinine Clearance: 146.2 mL/min (based on SCr of 0.87 mg/dL).    Creatinine for last 3 days  2021:  8:16 PM Creatinine 0.87 mg/dL    Recent Vancomycin Level(s) for last 3 days  No results found for requested labs within last 72 hours.      Vancomycin IV Administrations (past 72 hours)      No vancomycin orders with administrations in past 72 hours.                Nephrotoxins and other renal medications (From now, onward)    Start     Dose/Rate Route Frequency Ordered Stop    21 1000  vancomycin 1250 mg in 0.9% NaCl 250 mL intermittent infusion 1,250 mg         1,250 mg  over 90 Minutes Intravenous EVERY 12 HOURS 21  vancomycin 2500 mg in 0.9% NaCl 500 ml intermittent infusion 2,500 mg         2,500 mg  over 120 Minutes Intravenous ONCE 21            Contrast Orders - past 72 hours (72h ago, onward)            None          [employer] Prediction of Planned Initial Vancomycin Regimen  Loading dose: 2500 mg at 21:00 2021.  Regimen: 1250 mg IV every 12 hours.  Start time: 21:57 on 2021  Exposure target: AUC24 (range)400-600 mg/L.hr   AUC24,ss: 414 mg/L.hr  Probability of AUC24 > 400: 54 %  Ctrough,ss: 14.5 mg/L  Probability of Ctrough,ss > 20: 22 %  Probability of nephrotoxicity (Lodise BRIEN ): 10 %          Plan:  1. Start vancomycin  2500mg loading dose 1250 mg IV q12h.   2. Vancomycin monitoring method: AUC  3. Vancomycin therapeutic monitoring goal: 400-600 mg*h/L  4. Pharmacy will check vancomycin levels as appropriate in 1-3 Days.    5. Serum creatinine levels will be ordered daily for the first week of therapy and at least twice weekly for subsequent weeks.      Theo Ruiz [facility]

## 2021-12-27 NOTE — CONSULTS
[facility]    Infectious Disease Consultation     Date of Admission:  12/26/2021  Date of Consult : 12/27/21    Assessment:  51 year old male with right total hip arthroplasty, who is admitted with fever and sepsis syndrome related to right total hip arthroplasty infection. Blood cxs at outside hospital positive with gram positive cocci pending identification. Patient undergoing I&D today.    Recommendations:  1. Follow blood cxs  2. OR debridement as planned  3. Continue Vancomycin. Further antibiotic modification based upon culture data    Attempted to see patient multiple times but patient in OR. Full exam/note to follow  Suzy Agustin MD    Reason for Consult    I was asked to evaluate this patient for treatment recommendations for bacteremia and concern for total hip arthroplasty infection    Primary Care Physician   No primary care provider on file.    Chief Complaint   Fever, chills and right hip pain    History is obtained medical records    History of Present Illness   Aba Gan is a 51 year old male with prior right totla his arthroplasty, who is admitted since 12/26 as a transfer from [facility] where he had presented with fever of 102 degrees with sepsis syndrome (tachycardia and leukocytosis of 15K) and right hip pain and subsequently was found to have positive blood cxs with gram positive cocci pending identification. Patient was transferred for orthopedic evaluation of SHERLY infection.    Patient had a fever of 103.2 degrees on admission, leukocytosis has improved, blood cxs are pending and joint aspiration is planned. CT pelvis done at outside hospital showed intact hardware without effusion and Small amount of fluid within the right trochanteric bursa with subtle rim enhancement which could represent bursitis. Patient has been initiated on IV Vancomycin and ID has been asked to assist with further management.    Past Medical History   I have reviewed this patient's  medical history and updated it with pertinent information if needed.   No past medical history on file.    Past Surgical History   I have reviewed this patient's surgical history and updated it with pertinent information if needed.  No past surgical history on file.    Prior to Admission Medications   Prior to Admission Medications   Prescriptions Last Dose Informant Patient Reported? Taking?   aspirin (ASA) 81 MG chewable tablet   Yes Yes   Sig: Take 81 mg by mouth daily   atorvastatin (LIPITOR) 40 MG tablet   Yes Yes   Sig: Take 40 mg by mouth daily   cefuroxime (CEFTIN) 500 MG tablet   Yes Yes   Sig: Take 500 mg by mouth 2 times daily   fish oil-omega-3 fatty acids 1000 MG capsule   Yes Yes   Sig: Take 2,000 mg by mouth daily      Facility-Administered Medications: None     Allergies   No Known Allergies    Immunization History   Immunization History   Administered Date(s) Administered     COVID-19,PF,Moderna 02/16/2021, 03/17/2021       Social History   I have reviewed this patient's social history and updated it with pertinent information if needed. Aba Arrietaearlene      Family History   I have reviewed this patient's family history and updated it with pertinent information if needed.   No family history on file.    Review of Systems   Not obtained    Physical Exam   Temp: 98.5  F (36.9  C) Temp src: Oral BP: (!) 127/91 Pulse: 87   Resp: 16 SpO2: 96 % O2 Device: None (Room air)    Vital Signs with Ranges  Temp:  [98.5  F (36.9  C)-103.2  F (39.6  C)] 98.5  F (36.9  C)  Pulse:  [] 87  Resp:  [16-18] 16  BP: (120-159)/(79-99) 127/91  SpO2:  [94 %-98 %] 96 %  280 lbs 3.27 oz  Body mass index is 34.11 kg/m .    Not examined      Data   All laboratory data reviewed    Component      Latest Ref Rng & Units 12/26/2021   CRP Inflammation      0.0 - 8.0 mg/L 154.0 (H)   Sed Rate      0 - 20 mm/hr 67 (H)     Component      Latest Ref Rng & Units 12/26/2021   WBC      4.0 - 11.0 10e3/uL 10.6   RBC Count      4.40 - 5.90  10e6/uL 4.07 (L)   Hemoglobin      13.3 - 17.7 g/dL 11.9 (L)   Hematocrit      40.0 - 53.0 % 36.1 (L)   MCV      78 - 100 fL 89   MCH      26.5 - 33.0 pg 29.2   MCHC      31.5 - 36.5 g/dL 33.0   RDW      10.0 - 15.0 % 12.5   Platelet Count      150 - 450 10e3/uL 226     Component      Latest Ref Rng & Units 12/26/2021   Sodium      133 - 144 mmol/L 135   Potassium      3.4 - 5.3 mmol/L 4.0   Chloride      94 - 109 mmol/L 106   Carbon Dioxide      20 - 32 mmol/L 25   Anion Gap      3 - 14 mmol/L 4   Urea Nitrogen      7 - 30 mg/dL 14   Creatinine      0.66 - 1.25 mg/dL 0.87   Calcium      8.5 - 10.1 mg/dL 8.5   Glucose      70 - 99 mg/dL 112 (H)     Microbiology  12/26 blood cxs pending    Imaging  12/25 Ct pelvis  IMPRESSION:    1. Postoperative changes of right total hip arthroplasty. Hardware appears  intact without evidence of hardware failure.    2. No hip joint effusion is identified.    3. Small amount of fluid within the right trochanteric bursa with subtle  rim enhancement could represent bursitis.    4. No other acute findings in the pelvis.    Please note that all CT scans at this facility use dose modulation,  iterative reconstruction, and/or weight-based dosing when appropriate to  reduce radiation dose to as low as reasonably achievable

## 2021-12-27 NOTE — PLAN OF CARE
Patient is A&O x 4, increased temp early in shift, treated with tylenol. VS now stable on RA. Independent in room, NPO at midnight. Denies pain at rest. IR aspiration and ID consult scheduled for today.

## 2021-12-27 NOTE — PROGRESS NOTES
Pt has coverage for Vanco through their BCBS plan. Pt has an 80/20 coverage. OOP is $7500 (met 4450.69). Once OOP has been met pt should be covered at 100%.         ([facility]) In reference to admission date 12/26/2021 to check for iv abx coverage.          Please contact Intake with any questions, [phone] or In Basket pool,  Home Infusion ([phone]).

## 2021-12-27 NOTE — PROGRESS NOTES
[facility]    Received request for benefit check should the pt require home IV abx therapy. Aba has coverage for IV abx through their BCBS plan. Pt has an 80/20 coverage. OOP is $7500 (met 4450.69). Once OOP has been met pt should be covered at 100%.     Thank you    Alexandra Lane RN  [facility] Liaison  [phone] (Mon thru Fri 8am - 5pm)  [phone] Office

## 2021-12-27 NOTE — ANESTHESIA PREPROCEDURE EVALUATION
Anesthesia Pre-Procedure Evaluation    Patient: Aba Gan   MRN: [id number] : 1970        Preoperative Diagnosis: Post-operative infection [T81.40XA]    Procedure : Procedure(s):  RIGHT HIP IRRIGATION AND DEBRIDEMENT WITH POLY EXCHANGE AND HEAD EXCHANGE.          History reviewed. No pertinent past medical history.   History reviewed. No pertinent surgical history.   No Known Allergies   Social History     Tobacco Use     Smoking status: Not on file     Smokeless tobacco: Not on file   Substance Use Topics     Alcohol use: Not on file      Wt Readings from Last 1 Encounters:   21 127.1 kg (280 lb 3.3 oz)        Prior to Admission medications    Medication Sig Start Date End Date Taking? Authorizing Provider   aspirin (ASA) 81 MG chewable tablet Take 81 mg by mouth daily   Yes Unknown, Entered By History   atorvastatin (LIPITOR) 40 MG tablet Take 40 mg by mouth daily 21  Yes Unknown, Entered By History   cefuroxime (CEFTIN) 500 MG tablet Take 500 mg by mouth 2 times daily 21  Yes Unknown, Entered By History   fish oil-omega-3 fatty acids 1000 MG capsule Take 2,000 mg by mouth daily   Yes Unknown, Entered By History     Current Facility-Administered Medications Ordered in Epic   Medication Dose Route Frequency Last Rate Last Admin     acetaminophen (TYLENOL) tablet 650 mg  650 mg Oral Q4H PRN   650 mg at 21 1005     bisacodyl (DULCOLAX) Suppository 10 mg  10 mg Rectal Daily PRN         ondansetron (ZOFRAN-ODT) ODT tab 4 mg  4 mg Oral Q6H PRN        Or     ondansetron (ZOFRAN) injection 4 mg  4 mg Intravenous Q6H PRN         polyethylene glycol (MIRALAX) Packet 17 g  17 g Oral BID PRN         prochlorperazine (COMPAZINE) injection 10 mg  10 mg Intravenous Q6H PRN        Or     prochlorperazine (COMPAZINE) tablet 10 mg  10 mg Oral Q6H PRN        Or     prochlorperazine (COMPAZINE) suppository 25 mg  25 mg Rectal Q12H PRN         sennosides (SENOKOT) tablet 1-2 tablet  1-2 tablet Oral BID  PRN   2 tablet at 12/27/21 1005     vancomycin 1250 mg in 0.9% NaCl 250 mL intermittent infusion 1,250 mg  1,250 mg Intravenous Q12H   1,250 mg at 12/27/21 1005     No current [facility]-ordered outpatient medications on file.       Recent Results (from the past 744 hour(s))   XR Chest Port 1 View    Narrative    EXAM: XR CHEST PORT 1 VIEW  LOCATION: [facility]  DATE/TIME: 12/26/2021 9:15 PM    INDICATION: Cough, bacteremia.  COMPARISON: None.      Impression    IMPRESSION: Negative chest.   XR Joint Aspiration Major Right    Narrative    XR JOINT ASPIRATION MAJOR (HIP) RIGHT 12/27/2021 9:39 AM     HISTORY: infected right SHERLY. Positive blood cultures. Procedure  requested this morning, prior to going to the OR.    PROCEDURE:  The risks (including bleeding, infection, and allergy to  contrast and medications) and benefits of the procedure were explained  to the patient and consent was obtained.  Using sterile technique and  fluoroscopic guidance, a #20 gauge needle was placed into the hip  joint, against the femoral neck component, using an anterior approach.  Needle position confirmed fluoroscopically.  Less than 1 mL of  serosanguineous fluid was aspirated and sent for laboratory analysis.  No initial complication.  Fluoroscopy time: .1. One spot image.      Impression    IMPRESSION:  Technically successful hip joint aspiration. Laboratory  results pending.       Anesthesia Evaluation   Pt has had prior anesthetic. Type: General.    No history of anesthetic complications       ROS/MED HX  ENT/Pulmonary:     (+) sleep apnea, doesn't use CPAP,  (-) tobacco use, asthma, COPD and recent URI   Neurologic:    (-) no seizures and no CVA   Cardiovascular:     (+) Dyslipidemia -----Previous cardiac testing   Echo: Date: Results:    Stress Test: Date: 5/2017 Results:  1.  There is no evidence of significant myocardial ischemia or infarction.   2.  Normal left ventricular ejection fraction of 53 percent.      3.  Mild left ventricular enlargement.     ECG Reviewed: Date: 12/27/2021 Results:  NSR, RBBB, no acute abnormalities  Cath: Date: Results:   (-) angina, hypertension, CAD, CHF, MICHAEL, orthopnea/PND, syncope, arrhythmias, irregular heartbeat/palpitations, valvular problems/murmurs, angina, murmur and wheezes   METS/Exercise Tolerance: >4 METS    Hematologic:       Musculoskeletal:   (+) arthritis,     GI/Hepatic:    (-) GERD and liver disease   Renal/Genitourinary:    (-) renal disease   Endo:    (-) Type II DM, thyroid disease and chronic steroid usage   Psychiatric/Substance Use:    (-) alcohol abuse history   Infectious Disease: Comment: Admitted to OSH with Sepsis secondary to infected Total Right Hip     (+) Recent Fever,     Malignancy:       Other:            Physical Exam    Airway        Mallampati: II   TM distance: > 3 FB   Neck ROM: full   Mouth opening: > 3 cm    Respiratory Devices and Support         Dental  no notable dental history         Cardiovascular          Rhythm and rate: regular and normal (-) no murmur    Pulmonary           breath sounds clear to auscultation   (-) no rhonchi and no wheezes        OUTSIDE LABS:  CBC:   Lab Results   Component Value Date    WBC 10.6 12/26/2021    HGB 11.9 (L) 12/26/2021    HCT 36.1 (L) 12/26/2021     12/26/2021     BMP:   Lab Results   Component Value Date     12/26/2021    POTASSIUM 4.0 12/26/2021    CHLORIDE 106 12/26/2021    CO2 25 12/26/2021    BUN 14 12/26/2021    CR 0.87 12/26/2021     (H) 12/26/2021     COAGS:   Lab Results   Component Value Date    INR 1.14 12/26/2021     OTHER:   Lab Results   Component Value Date    LACT 1.1 12/26/2021    SCOUT 8.5 12/26/2021    .0 (H) 12/26/2021    SED 67 (H) 12/26/2021       Anesthesia Plan    ASA Status:  2, emergent    NPO Status:  NPO Appropriate    Anesthesia Type: General.     - Airway: ETT   Induction: Propofol, Intravenous.   Maintenance: Balanced.   Techniques and Equipment:        - Blood: T&S     Consents    Anesthesia Plan(s) and associated risks, benefits, and realistic alternatives discussed. Questions answered and patient/representative(s) expressed understanding.    - Discussed:     - Discussed with:  Patient         Postoperative Care    Pain management: Multi-modal analgesia.   PONV prophylaxis: Ondansetron (or other 5HT-3), Background Propofol Infusion     Comments:                Andrei Rothman MD

## 2021-12-27 NOTE — PLAN OF CARE
Pt. A&o, independent in the room, temp 103.2 provider notified and tylenol given, voiding in b.r, waiting for lab draw to dose antibiotics. Will continue to monitor.   the EKG is unchanged from prior EKG

## 2021-12-27 NOTE — PROGRESS NOTES
Since pt does not have insurance they will be self-pay for iv abx. Based on Vanco 1250mg q12h, total cost is $187.72 per day for drug and supplies. Nursing cost will be $90.00 a visit if [facility] for it. Let us know how pt would like to proceed.           ([facility]) In reference to admission date 12/26/2021 to check for iv abx coverage,             Please contact Intake with any questions, [phone] or In Basket pool,  Home [facility]fusion ([phone]).

## 2021-12-27 NOTE — PROGRESS NOTES
[facility]    Hospitalist Progress Note    Date of Service (when I saw the patient): 12/27/2021  Admit date: 12/26/2021    Assessment & Plan   Aba Gan is a 51 year old male with pmhx HLD and right total hip arthroplasty who is a direct admission under the care of orthopedics service after presenting to outside facility with R hip pain, fevers, and chills on 12/25 found to have gram positive cocci bacteremia. Hospitalist service is consulted for empiric antibiotic recommendations and preoperative clearance.     Sepsis 2/2 gram + cocci bacteremia  Septic R hip arthroplasty - S/p RHA 6 weeks prior to admission.   Returned positive 12/26 in 2/2 blood cultures ([facility] from 12/25). Fever (103.2 F), tachycardia, leukocytosis 15 at [facility] CT right hip reportedly no significant effusion present, no fractures evident.  * EKG reviewed: NSR with RBBB without acute ischemic changes.   * CXR clear   S/p I and D of RHA with head and liner exchange on 12/27/21    - follow BCx obtained on admission.   - appreciate ID consult regarding abx choice and duration.  - continue vancomycin as we await culture results.     Dry cough   Triple vaccinated for COVID19, received flu vaccine. Negative COVID [facility] 12/25. Mostly dry cough x 5 days, fever.  * CXR clear,      HLD.  - Hold PTA ASA (1  prevention d/t FHx).  - Cont statin.     LINDSEY.  Not on treatment.  Diet: Orders Placed This Encounter      NPO per Anesthesia Guidelines for Procedure/Surgery Except for: Meds     IVF: per surgery post op  Regalado Catheter: Not present     DVT Prophylaxis: PCDs and per ortho.   Code Status: Full Code     Disposition: Expected discharge TBD    Netta Delarosa  Hospitalist Service  [facility]  Securely message with the [employer]oc[employer] Web Console (learn more here)  Text page via [employer] Paging/Directory    Interval History   Events over last 24 hours as outlined above.   Patient doing well post-op. Hemodynamically  "stable. No SOB. Pain controlled. No N/V. Great attitude, \"it happens.\"     -Data reviewed today: I reviewed all new labs and imaging results over the last 24 hours. I personally reviewed the EKG tracing showing as above.  and the chest x-ray image(s) showing as above. .    Physical Exam   Temp: 98.5  F (36.9  C) Temp src: Oral BP: (!) 127/91 Pulse: 87   Resp: 16 SpO2: 96 % O2 Device: None (Room air)    Vitals:    12/26/21 2016   Weight: 127.1 kg (280 lb 3.3 oz)     Vital Signs with Ranges  Temp:  [98.5  F (36.9  C)-103.2  F (39.6  C)] 98.5  F (36.9  C)  Pulse:  [] 87  Resp:  [16-18] 16  BP: (120-159)/(79-99) 127/91  SpO2:  [94 %-98 %] 96 %  No intake/output data recorded.    Today's Exam  Constitutional:  NAD,   Neuropsyche:  Very pleasant affect, alert and oriented, answers questions appropriately. Speech normal, face symmetric and moving all 4 extremities without gross focal neurological deficit.   Respiratory:  Breathing comfortably, good air exchange, no wheezes, no crackles.   Cardiovascular:  Regular rate and rhythm, no edema.  GI:  soft, NT/ND, BS normal  Skin/Integumen:  No acute rash or sign of bleeding.         Medications   All medications reviewed on 12/27/21       vancomycin  1,250 mg Intravenous Q12H       Data   Recent Labs   Lab 12/26/21 2016   WBC 10.6   HGB 11.9*   MCV 89      INR 1.14      POTASSIUM 4.0   CHLORIDE 106   CO2 25   BUN 14   CR 0.87   ANIONGAP 4   SCOUT 8.5   *       Recent Results (from the past 24 hour(s))   XR Chest Port 1 View    Narrative    EXAM: XR CHEST PORT 1 VIEW  LOCATION: [facility]  DATE/TIME: 12/26/2021 9:15 PM    INDICATION: Cough, bacteremia.  COMPARISON: None.      Impression    IMPRESSION: Negative chest.         "

## 2021-12-27 NOTE — ANESTHESIA CARE TRANSFER NOTE
Patient: Aba Gan    Procedure: Procedure(s):  RIGHT HIP IRRIGATION AND DEBRIDEMENT WITH POLY AND HEAD EXCHANGE.       Diagnosis: Post-operative infection [T81.40XA]  Diagnosis Additional Information: No value filed.    Anesthesia Type:   General     Note:    Oropharynx: oropharynx clear of all foreign objects and spontaneously breathing  Level of Consciousness: awake  Oxygen Supplementation: face mask  Level of Supplemental Oxygen (L/min / FiO2): 6  Independent Airway: airway patency satisfactory and stable  Dentition: dentition unchanged  Vital Signs Stable: post-procedure vital signs reviewed and stable  Report to RN Given: handoff report given  Patient transferred to: PACU  Comments: Neuromuscular blockade reversed after TOF 4/4, spontaneous respirations, adequate tidal volumes, followed commands to voice, oropharynx suctioned with soft flexible catheter, extubated atraumatically, extubated with suction, airway patent after extubation.  Oxygen via facemask at 6 liters per minute to PACU. Oxygen tubing connected to wall O2 in PACU, SpO2, NiBP, and EKG monitors and alarms on and functioning, Servando Hugger warmer connected to patient gown, report on patient's clinical status given to PACU RN, RN questions answered.     Handoff Report: Identifed the Patient, Identified the Reponsible Provider, Reviewed the pertinent medical history, Discussed the surgical course, Reviewed Intra-OP anesthesia mangement and issues during anesthesia, Set expectations for post-procedure period and Allowed opportunity for questions and acknowledgement of understanding      Vitals:  Vitals Value Taken Time   /95 12/27/21 1641   Temp     Pulse 98 12/27/21 1643   Resp 21 12/27/21 1643   SpO2 98 % 12/27/21 1643   Vitals shown include unvalidated device data.    Electronically Signed By: DWIGHT Layton CRNA  December 27, 2021  4:44 PM

## 2021-12-28 ENCOUNTER — APPOINTMENT (OUTPATIENT)
Dept: PHYSICAL THERAPY | Facility: CLINIC | Age: 51
End: 2021-12-28
Attending: ORTHOPAEDIC SURGERY
Payer: COMMERCIAL

## 2021-12-28 LAB
CREAT SERPL-MCNC: 0.82 MG/DL (ref 0.66–1.25)
GFR SERPL CREATININE-BSD FRML MDRD: >90 ML/MIN/1.73M2
GLUCOSE BLD-MCNC: 109 MG/DL (ref 70–99)
HGB BLD-MCNC: 10.6 G/DL (ref 13.3–17.7)
VANCOMYCIN SERPL-MCNC: 8.2 MG/L

## 2021-12-28 PROCEDURE — 82565 ASSAY OF CREATININE: CPT | Performed by: ORTHOPAEDIC SURGERY

## 2021-12-28 PROCEDURE — 250N000011 HC RX IP 250 OP 636: Performed by: ORTHOPAEDIC SURGERY

## 2021-12-28 PROCEDURE — 258N000003 HC RX IP 258 OP 636: Performed by: ORTHOPAEDIC SURGERY

## 2021-12-28 PROCEDURE — 97530 THERAPEUTIC ACTIVITIES: CPT | Mod: GP | Performed by: PHYSICAL THERAPIST

## 2021-12-28 PROCEDURE — 85018 HEMOGLOBIN: CPT | Performed by: ORTHOPAEDIC SURGERY

## 2021-12-28 PROCEDURE — 250N000011 HC RX IP 250 OP 636: Performed by: SPECIALIST

## 2021-12-28 PROCEDURE — 97161 PT EVAL LOW COMPLEX 20 MIN: CPT | Mod: GP | Performed by: PHYSICAL THERAPIST

## 2021-12-28 PROCEDURE — 250N000013 HC RX MED GY IP 250 OP 250 PS 637: Performed by: ORTHOPAEDIC SURGERY

## 2021-12-28 PROCEDURE — 99232 SBSQ HOSP IP/OBS MODERATE 35: CPT | Performed by: INTERNAL MEDICINE

## 2021-12-28 PROCEDURE — 82947 ASSAY GLUCOSE BLOOD QUANT: CPT | Performed by: INTERNAL MEDICINE

## 2021-12-28 PROCEDURE — 97116 GAIT TRAINING THERAPY: CPT | Mod: GP | Performed by: PHYSICAL THERAPIST

## 2021-12-28 PROCEDURE — 120N000001 HC R&B MED SURG/OB

## 2021-12-28 PROCEDURE — 80202 ASSAY OF VANCOMYCIN: CPT | Performed by: ORTHOPAEDIC SURGERY

## 2021-12-28 PROCEDURE — 250N000013 HC RX MED GY IP 250 OP 250 PS 637: Performed by: INTERNAL MEDICINE

## 2021-12-28 PROCEDURE — 36415 COLL VENOUS BLD VENIPUNCTURE: CPT | Performed by: ORTHOPAEDIC SURGERY

## 2021-12-28 RX ORDER — ASPIRIN 81 MG/1
325 TABLET, CHEWABLE ORAL DAILY
Status: DISCONTINUED | OUTPATIENT
Start: 2021-12-28 | End: 2021-12-28

## 2021-12-28 RX ORDER — CEFAZOLIN SODIUM 2 G/100ML
2 INJECTION, SOLUTION INTRAVENOUS EVERY 8 HOURS
Status: DISCONTINUED | OUTPATIENT
Start: 2021-12-28 | End: 2021-12-29 | Stop reason: HOSPADM

## 2021-12-28 RX ORDER — CEFAZOLIN SODIUM 1 G/50ML
1750 SOLUTION INTRAVENOUS EVERY 12 HOURS
Status: DISCONTINUED | OUTPATIENT
Start: 2021-12-28 | End: 2021-12-28

## 2021-12-28 RX ORDER — ATORVASTATIN CALCIUM 20 MG/1
40 TABLET, FILM COATED ORAL DAILY
Status: DISCONTINUED | OUTPATIENT
Start: 2021-12-28 | End: 2021-12-29 | Stop reason: HOSPADM

## 2021-12-28 RX ORDER — CEFAZOLIN SODIUM 2 G/100ML
2 INJECTION, SOLUTION INTRAVENOUS EVERY 8 HOURS
Qty: 12300 ML | Refills: 0 | DISCHARGE
Start: 2021-12-28 | End: 2022-02-07

## 2021-12-28 RX ADMIN — ATORVASTATIN CALCIUM 40 MG: 20 TABLET, FILM COATED ORAL at 14:50

## 2021-12-28 RX ADMIN — ASPIRIN 325 MG: 325 TABLET, COATED ORAL at 08:26

## 2021-12-28 RX ADMIN — VANCOMYCIN HYDROCHLORIDE 1750 MG: 5 INJECTION, POWDER, LYOPHILIZED, FOR SOLUTION INTRAVENOUS at 10:38

## 2021-12-28 RX ADMIN — SENNOSIDES AND DOCUSATE SODIUM 1 TABLET: 50; 8.6 TABLET ORAL at 21:37

## 2021-12-28 RX ADMIN — SENNOSIDES AND DOCUSATE SODIUM 1 TABLET: 50; 8.6 TABLET ORAL at 08:26

## 2021-12-28 RX ADMIN — CEFAZOLIN SODIUM 2 G: 2 INJECTION, SOLUTION INTRAVENOUS at 14:51

## 2021-12-28 RX ADMIN — POLYETHYLENE GLYCOL 3350 17 G: 17 POWDER, FOR SOLUTION ORAL at 08:26

## 2021-12-28 RX ADMIN — ACETAMINOPHEN 975 MG: 325 TABLET, FILM COATED ORAL at 14:49

## 2021-12-28 RX ADMIN — CEFAZOLIN SODIUM 2 G: 2 INJECTION, SOLUTION INTRAVENOUS at 21:38

## 2021-12-28 RX ADMIN — ACETAMINOPHEN 975 MG: 325 TABLET, FILM COATED ORAL at 06:00

## 2021-12-28 RX ADMIN — KETOROLAC TROMETHAMINE 15 MG: 15 INJECTION, SOLUTION INTRAMUSCULAR; INTRAVENOUS at 06:01

## 2021-12-28 RX ADMIN — KETOROLAC TROMETHAMINE 15 MG: 15 INJECTION, SOLUTION INTRAMUSCULAR; INTRAVENOUS at 17:44

## 2021-12-28 RX ADMIN — ACETAMINOPHEN 975 MG: 325 TABLET, FILM COATED ORAL at 21:37

## 2021-12-28 RX ADMIN — KETOROLAC TROMETHAMINE 15 MG: 15 INJECTION, SOLUTION INTRAMUSCULAR; INTRAVENOUS at 13:06

## 2021-12-28 ASSESSMENT — ACTIVITIES OF DAILY LIVING (ADL)
ADLS_ACUITY_SCORE: 3

## 2021-12-28 NOTE — PROGRESS NOTES
12/28/21 1015   Quick Adds   Type of Visit Initial PT Evaluation   Living Environment   People in home spouse   Current Living Arrangements house   Home Accessibility stairs to enter home   Number of Stairs, Main Entrance 2   Stair Railings, Main Entrance none   Transportation Anticipated car, drives self;family or friend will provide   Living Environment Comments Pt's spouse and son will be able to assist at discharge.    Self-Care   Usual Activity Tolerance good   Current Activity Tolerance moderate   Equipment Currently Used at Home none   Activity/Exercise/Self-Care Comment Pt owns SEC and FWW.    Disability/Function   Fall history within last six months no   General Information   Onset of Illness/Injury or Date of Surgery 12/26/21   Referring Physician Jimi Edwards MD   Patient/Family Therapy Goals Statement (PT) Return home.    Pertinent History of Current Problem (include personal factors and/or comorbidities that impact the POC) 52 y/o male POD # 1 R hip I&D and poly and head exchange. PMH including HLD, R SHERLY.    Existing Precautions/Restrictions fall;90 degree hip flexion   Weight-Bearing Status - RLE weight-bearing as tolerated   General Observations Pt sitting EOB upon arrival of therapist.    Cognition   Orientation Status (Cognition) oriented x 3   Affect/Mental Status (Cognition) WFL   Follows Commands (Cognition) WFL   Pain Assessment   Patient Currently in Pain   (R hip pain at rest: 3/10)   Integumentary/Edema   Integumentary/Edema Comments R hip incision not observed, wound vac donned.    Range of Motion (ROM)   ROM Comment Limited R hip ROM d/t pain.     Strength   Strength Comments Not formally assessed, pt demonstrates at least 3/5 grossly in B LEs.   Bed Mobility   Comment (Bed Mobility) NT.    Transfers   Transfer Safety Comments Sit <> stand with FWW and SBA.    Gait/Stairs (Locomotion)   Comment (Gait/Stairs) Pt amb 10' wtih FWW and SBA.    Balance   Balance Comments Noted good seated and  standing balance at FWW.    Clinical Impression   Criteria for Skilled Therapeutic Intervention yes, treatment indicated   PT Diagnosis (PT) Difficulty with functional mobility.    Influenced by the following impairments Pain, Impaired R hip ROM, Decreased strength, Decreased activity tolerance    Functional limitations due to impairments Limited functional mobility requiring AD and assist.    Clinical Presentation Stable/Uncomplicated   Clinical Presentation Rationale Based on PMH, current presentation, and social support.    Clinical Decision Making (Complexity) low complexity   Therapy Frequency (PT) Daily   Predicted Duration of Therapy Intervention (days/wks) 3 days   Planned Therapy Interventions (PT) bed mobility training;cryotherapy;gait training;ROM (range of motion);stair training;strengthening;transfer training   Risk & Benefits of therapy have been explained patient   PT Discharge Planning    PT Discharge Recommendation (DC Rec) home with assist   PT Rationale for DC Rec Anticipate pt will continue to progress towards independence in order to safely discharge home wtih assist of spouse.    Total Evaluation Time   Total Evaluation Time (Minutes) 5

## 2021-12-28 NOTE — PROGRESS NOTES
"Orthopedic Surgery  12/28/2021  POD #1    Mr. Gan is doing well status post right hip irrigation and debridement with poly and head exchange. Patient voices no complaints today. Pain is well controlled and patient reports only incisional pain. We are waiting for infectious disease to see him today still, they tried yesterday but the patient was in the OR. He is overall doing well, voiding, and denies any nausea or vomiting. He is receiving Vacomycin Q12 hours dosed by the pharmacy.    Vitals:   Blood pressure 122/85, pulse 82, temperature 99.3  F (37.4  C), temperature source Oral, resp. rate 16, height 1.93 m (6' 4\"), weight 127.1 kg (280 lb 3.3 oz), SpO2 95 %.    Labs:  Lab Results   Component Value Date    HGB 10.6 12/28/2021       Exam:  Patient is awake, alert, and sitting up in bed talking.   Neurovascularly intact.  Calves are soft and non-tender bilaterally.  The foot is warm with normal capillary refill.  The incision is covered with a Pravena wound vac, which is is C/D/I.    Assessment:  S/P right hip irrigation and debridement with poly and head exchange    Plan:   1. No dressing change, leave Pravena wound vac in place for a total of 14 days until post-op on 1/10/22.  2. Mobilize and continue physical therapy.   3. DVT prophylaxis with Aspirin 325mg.  4. Pain control with Tylenol and Oxycodone  5. Discharge to home anticipated in 1-2 days.  6. Waiting on infectious disease to see patient.   7. Will be discharged home with 6 weeks IV antibiotics.     Syl hCance PA-C  [phone]        "

## 2021-12-28 NOTE — PROGRESS NOTES
[facility]    Infectious Disease Progress Note    Date of Service : 12/28/2021     Assessment:  51 year old male with right total hip arthroplasty, who is admitted right total hip arthroplasty infection and sepsis related to methicllin sensitive S.aurues. Blood cxs from 12/25 at outside hospital were positive. .s/p I&D with poly and head exchange.     Recommendations:  1. Discontinue Vancomycin  2. Cefazolin 2 grams Q8H  3. Follow blood cxs and OR cxs  4. Plan PICC tomorrow if blood cxs remain negative  5. Plan to add rifampin for biofilm effect by tomorrow if isolate is sensitive  6. Will need long term IV antibiotic therapy for 6 weeks followed by oral suppressive therapy for a total of 3 months for PJI of the hip per IDSA guidelines.    Suzy Agustin MD    Interval History   Patient was seen and examined, doing well, right hip pain controlled, remained afebrile, tolerating antibiotics without side effects    Physical Exam   Temp: 99.3  F (37.4  C) Temp src: Oral BP: 122/85 Pulse: 82   Resp: 16 SpO2: 95 % O2 Device: None (Room air) Oxygen Delivery: 2 LPM  Vitals:    12/26/21 2016   Weight: 127.1 kg (280 lb 3.3 oz)     Vital Signs with Ranges  Temp:  [98.5  F (36.9  C)-99.3  F (37.4  C)] 99.3  F (37.4  C)  Pulse:  [78-98] 82  Resp:  [12-25] 16  BP: (122-147)/(84-95) 122/85  SpO2:  [90 %-98 %] 95 %    Constitutional: Awake, alert, cooperative, no apparent distress  Lungs: Clear to auscultation bilaterally, no crackles or wheezing  Cardiovascular: Regular rate and rhythm, normal S1 and S2, and no murmur noted  Abdomen: Normal bowel sounds, soft, non-distended, non-tender  Skin: No rashes, no cyanosis, no edema  Other:    Medications     lactated ringers 100 mL/hr at 12/27/21 1901       acetaminophen  975 mg Oral Q8H HAYDEE     aspirin  325 mg Oral Daily     ketorolac  15 mg Intravenous Q6H     polyethylene glycol  17 g Oral Daily     senna-docusate  1 tablet Oral BID     sodium chloride (PF)  3 mL  Intracatheter Q8H     vancomycin  1,750 mg Intravenous Q12H       Data   All microbiology laboratory data reviewed.  Recent Labs   Lab Test 12/28/21  0753 12/26/21 2016   WBC  --  10.6   HGB 10.6* 11.9*   HCT  --  36.1*   MCV  --  89   PLT  --  226     Recent Labs   Lab Test 12/28/21  0753 12/27/21  1203 12/26/21 2016   CR 0.82 0.86 0.87     Recent Labs   Lab Test 12/26/21 2016   SED 67*     Microbiology  12/26 blood cxs pending  12/27 OR cxx pending    Outside blood cxs  12/25 blood cxs MSSA    Imaging  12/25 Ct pelvis  IMPRESSION:    1. Postoperative changes of right total hip arthroplasty. Hardware appears  intact without evidence of hardware failure.    2. No hip joint effusion is identified.    3. Small amount of fluid within the right trochanteric bursa with subtle  rim enhancement could represent bursitis.    4. No other acute findings in the pelvis.    Please note that all CT scans at this facility use dose modulation,  iterative reconstruction, and/or weight-based dosing when appropriate to  reduce radiation dose to as low as reasonably achievable

## 2021-12-28 NOTE — PLAN OF CARE
Patient returned from surgery at 1830. Pt A&Ox4. CMS intact. VSS. Due to get OOB this evening. Rating pain 3/10. Due to void. Continue to monitor.

## 2021-12-28 NOTE — PHARMACY-VANCOMYCIN DOSING SERVICE
Pharmacy Vancomycin Note  Date of Service 2021  Patient's  1970   51 year old, male    Indication: Bacteremia  Day of Therapy: 3  Current vancomycin regimen:  1250 mg IV q12h  Current vancomycin monitoring method: AUC  Current vancomycin therapeutic monitoring goal: 400-600 mg*h/L    InsightRX Prediction of Current Vancomycin Regimen  Loading dose: N/A  Regimen: 1250 mg IV every 12 hours.  Start time: 10:05 on 2021  Exposure target: AUC24 (range)400-600 mg/L.hr   AUC24,ss: 343 mg/L.hr  Probability of AUC24 > 400: 22 %  Ctrough,ss: 9.6 mg/L  Probability of Ctrough,ss > 20: 2 %  Probability of nephrotoxicity (Lodise BRIEN ): 6 %    Current estimated CrCl = Estimated Creatinine Clearance: 155.1 mL/min (based on SCr of 0.82 mg/dL).    Creatinine for last 3 days  2021:  8:16 PM Creatinine 0.87 mg/dL  2021: 12:03 PM Creatinine 0.86 mg/dL  2021:  7:53 AM Creatinine 0.82 mg/dL    Recent Vancomycin Levels (past 3 days)  2021:  7:53 AM Vancomycin 8.2 mg/L    Vancomycin IV Administrations (past 72 hours)                   vancomycin 1250 mg in 0.9% NaCl 250 mL intermittent infusion 1,250 mg (mg) 1,250 mg New Bag 21 2205     1,250 mg New Bag  1005    vancomycin 2500 mg in 0.9% NaCl 500 ml intermittent infusion 2,500 mg (mg) 2,500 mg New Bag 21 2242                Nephrotoxins and other renal medications (From now, onward)    Start     Dose/Rate Route Frequency Ordered Stop    21 1000  vancomycin (VANCOCIN) 1,750 mg in sodium chloride 0.9 % 500 mL intermittent infusion         1,750 mg  over 2 Hours Intravenous EVERY 12 HOURS 21 0842      21 0000  ketorolac (TORADOL) injection 15 mg        Note to Pharmacy: IF celecoxib was given pre-operatively, start ketorolac 12 hours after celecoxib given.    15 mg Intravenous EVERY 6 HOURS 21 1820 21 2359             Contrast Orders - past 72 hours (72h ago, onward)            None           Interpretation of levels and current regimen:  Vancomycin level is reflective of AUC less than 400    Has serum creatinine changed greater than 50% in last 72 hours: No    Renal Function: Stable    InsightRX Prediction of Planned New Vancomycin Regimen  Loading dose: N/A  Regimen: 1750 mg IV every 12 hours.  Start time: 10:05 on 12/28/2021  Exposure target: AUC24 (range)400-600 mg/L.hr   AUC24,ss: 479 mg/L.hr  Probability of AUC24 > 400: 82 %  Ctrough,ss: 13.7 mg/L  Probability of Ctrough,ss > 20: 13 %  Probability of nephrotoxicity (Lodise BRIEN 2009): 9 %    Plan:  1. Increase Dose to 1750 mg q 12 hrs  2. Vancomycin monitoring method: AUC  3. Vancomycin therapeutic monitoring goal: 400-600 mg*h/L  4. Pharmacy will check vancomycin levels as appropriate in 1-3 Days.  5. Serum creatinine levels will be ordered daily for the first week of therapy and at least twice weekly for subsequent weeks.    Marie Kellogg [facility]

## 2021-12-28 NOTE — PLAN OF CARE
Alert and Oriented x4. CMS intact. VSS. Voiding in urinal adequately. Pian well controlled by scheduled Toradol and Tylenol. Continue to monitor.

## 2021-12-28 NOTE — PROGRESS NOTES
[facility]    Hospitalist Progress Note    Date of Service (when I saw the patient): 12/28/2021  Admit date: 12/26/2021    Assessment & Plan   Aba Gan is a 51 year old male with pmhx HLD and right total hip arthroplasty who is a direct admission under the care of orthopedics service after presenting to outside facility with R hip pain, fevers, and chills on 12/25 found to have gram positive cocci bacteremia. Hospitalist service is consulted for empiric antibiotic recommendations and preoperative clearance.     Sepsis 2/2 gram + cocci bacteremia  Septic R hip arthroplasty - S/p RHA 6 weeks prior to admission.   Returned positive 12/26 in 2/2 blood cultures ([facility] from 12/25). Fever (103.2 F), tachycardia, leukocytosis 15 at [facility] CT right hip reportedly no significant effusion present, no fractures evident.  * EKG reviewed: NSR with RBBB without acute ischemic changes.   * CXR clear   S/p I and D of RHA with head and liner exchange on 12/27/21    - BCx from outside hospital growing MSSA  - BCx here NGTD, starting with admission 12/26   - Appreciate ID consult   - Vanco changed to cefazolin 2 g IV q 8 hours. Followed by oral suppressive therapy for total of 3 months.   - Pain controlled no recent oxycodone use.     Dry cough   Triple vaccinated for COVID19, received flu vaccine. Negative COVID [facility] 12/25. Mostly dry cough x 5 days, fever.  * CXR clear     HLD.  - Hold PTA ASA (1  prevention d/t FHx).  - Cont statin.     LINDSEY.  Not on treatment.  Diet: Orders Placed This Encounter      Advance Diet as Tolerated: Regular Diet Adult     IVF: per surgery post op  Regalado Catheter: Not present     DVT Prophylaxis: PCDs and ASA per ortho.   Code Status: Full Code     Disposition: Expected discharge once PICC line placed and home IV abx determined.   Communication: Discussed with patient and wife on 12/28/21      Netta Delarosa  Hospitalist Service  [facility]  "Hospital  Securely message with the [employer] Web Console (learn more here)  Text page via [employer] Paging/Directory    Interval History   Events over last 24 hours as outlined above.   Patient doing well post-op. Hemodynamically stable. No SOB. Pain controlled. No N/V. Great attitude, \"it happens.\"     -Data reviewed today: I reviewed all new labs and imaging results over the last 24 hours. I personally reviewed the EKG tracing showing as above.  and the chest x-ray image(s) showing as above. .    Physical Exam   Temp: 98.7  F (37.1  C) Temp src: Oral BP: 105/76 Pulse: 83   Resp: 16 SpO2: 95 % O2 Device: None (Room air) Oxygen Delivery: 2 LPM  Vitals:    12/26/21 2016   Weight: 127.1 kg (280 lb 3.3 oz)     Vital Signs with Ranges  Temp:  [98.5  F (36.9  C)-99.3  F (37.4  C)] 98.7  F (37.1  C)  Pulse:  [78-98] 83  Resp:  [12-25] 16  BP: (105-147)/(76-95) 105/76  SpO2:  [90 %-98 %] 95 %  I/O last 3 completed shifts:  In: 1000 [I.V.:1000]  Out: 2090 [Urine:1890; Blood:200]    Today's Exam  Constitutional:  NAD,   Neuropsyche:  Very pleasant affect, alert and oriented, answers questions appropriately. Speech normal, face symmetric and moving all 4 extremities without gross focal neurological deficit.   Respiratory:  Breathing comfortably, good air exchange, no wheezes, no crackles.   Cardiovascular:  Regular rate and rhythm, no edema.  GI:  soft, NT/ND, BS normal  Skin/Integumen:  No acute rash or sign of bleeding.     Medications   All medications reviewed on 12/28/21     lactated ringers 100 mL/hr at 12/27/21 1901       acetaminophen  975 mg Oral Q8H HAYDEE     aspirin  325 mg Oral Daily     atorvastatin  40 mg Oral Daily     ceFAZolin  2 g Intravenous Q8H     ketorolac  15 mg Intravenous Q6H     polyethylene glycol  17 g Oral Daily     senna-docusate  1 tablet Oral BID     sodium chloride (PF)  3 mL Intracatheter Q8H       Data   Recent Labs   Lab 12/28/21  0753 12/27/21  1203 12/26/21 2016   WBC  --   --  10.6   HGB 10.6*  " --  11.9*   MCV  --   --  89   PLT  --   --  226   INR  --   --  1.14   NA  --   --  135   POTASSIUM  --   --  4.0   CHLORIDE  --   --  106   CO2  --   --  25   BUN  --   --  14   CR 0.82 0.86 0.87   ANIONGAP  --   --  4   SCOUT  --   --  8.5   *  --  112*       Recent Results (from the past 24 hour(s))   XR Pelvis w Hip Port Right 1 View    Narrative    EXAM: XR PELVIS AD HIP PORTABLE RIGHT 1 VIEW  LOCATION: [facility]  DATE/TIME: 12/27/2021 5:57 PM    INDICATION: Status post SHERLY.  COMPARISON: None.      Impression    IMPRESSION: Right total hip arthroplasty in place. No evidence of complication.

## 2021-12-28 NOTE — PLAN OF CARE
Pt up to chair and walking in halls with SB assist.  Good CMS to hip.  Voiding good via urinal.  Rates pain #3 but not requiring narcs but taking scheduled Toradol.  Hip drsg intact with wound vac intact.

## 2021-12-28 NOTE — PROGRESS NOTES
"ORTHOPEDIC LOWER EXTREMITY PROGRESS NOTE    POD#1  Patient is a 51 year old male who underwent Procedure(s):  RIGHT HIP IRRIGATION AND DEBRIDEMENT WITH POLY AND HEAD EXCHANGE 2021. Pain is well controlled. Tolerating medication well, no nausea or vomiting.  Overall doing well, no complaints.  Voiding well, passing gas.    Vitals:   Blood pressure 125/84, pulse 85, temperature 98.7  F (37.1  C), temperature source Oral, resp. rate 15, height 1.93 m (6' 4\"), weight 127.1 kg (280 lb 3.3 oz), SpO2 95 %.  Temp (24hrs), Av.7  F (37.1  C), Min:98.5  F (36.9  C), Max:99  F (37.2  C)      Drains: prevena wound vac in place with empty cannister    EXAM   The patient is awake and alert.  Calves are soft and non-tender.   Sensation is intact.  Dorsiflexion and plantar flexion is intact.  Foot warm with nl cap refill.  The incision is covered with prevena wound vac    Labs:   Recent Labs   Lab Test 21  0753 21   HGB 10.6* 11.9*   INR  --  1.14     CX: Blood cxs at outside hospital positive with gram positive cocci pending identification  Blood cultures taken here showing NGTD    ASSESSMENT  S/p  R hip I&D with head and liner exchange     PLAN  1. DVT prophylaxis: aspirin  2. Weight Bearing: WBAT (Weight bearing as tolerated).  3. Anticipated discharge date 1-2 days. Discharge to Home with Home Carefor IV abx therapy  4. Cont Pain Control Oxycodone and Tylenol    Deidre Hess PA-C  [facility]        "

## 2021-12-28 NOTE — PLAN OF CARE
A/O x4. AVSS on RA. WBAT; Ambulating halls w/ 1, gb, walker. Pain managed w/ scheduled tylenol and Toradol. Pravena wound vac in place. IV vanco Q12H. Tolerating regular diet. Voiding adequately in urinal. Spouse at bedside.

## 2021-12-28 NOTE — ANESTHESIA POSTPROCEDURE EVALUATION
Patient: Aba Gan    Procedure: Procedure(s):  RIGHT HIP IRRIGATION AND DEBRIDEMENT WITH POLY AND HEAD EXCHANGE.       Diagnosis:Post-operative infection [T81.40XA]  Diagnosis Additional Information: No value filed.    Anesthesia Type:  General    Note:  Disposition: Inpatient   Postop Pain Control: Uneventful            Sign Out: Well controlled pain   PONV: No   Neuro/Psych: Uneventful            Sign Out: Acceptable/Baseline neuro status   Airway/Respiratory: Uneventful            Sign Out: Acceptable/Baseline resp. status   CV/Hemodynamics: Uneventful            Sign Out: Acceptable CV status; No obvious hypovolemia; No obvious fluid overload   Other NRE: NONE   DID A NON-ROUTINE EVENT OCCUR? No           Last vitals:  Vitals Value Taken Time   /86 12/27/21 1810   Temp 36.9  C (98.5  F) 12/27/21 1700   Pulse 88 12/27/21 1813   Resp 19 12/27/21 1813   SpO2 97 % 12/27/21 1813   Vitals shown include unvalidated device data.    Electronically Signed By: Gilberto Prescott DO, DO  December 27, 2021  6:53 PM

## 2021-12-28 NOTE — PLAN OF CARE
Occupational Therapy: Orders received. Chart reviewed and discussed with care team.? Occupational Therapy not indicated due to pt here for I&D. Had posterior hip previously with OT goal all met. Screened, no IP OT needs.? Defer discharge recommendations to care team and PT.? Will complete orders.

## 2021-12-29 ENCOUNTER — HOME INFUSION (PRE-WILLOW HOME INFUSION) (OUTPATIENT)
Dept: PHARMACY | Facility: CLINIC | Age: 51
End: 2021-12-29
Payer: COMMERCIAL

## 2021-12-29 ENCOUNTER — HOME INFUSION (PRE-WILLOW HOME INFUSION) (OUTPATIENT)
Dept: PHARMACY | Facility: CLINIC | Age: 51
End: 2021-12-29

## 2021-12-29 ENCOUNTER — APPOINTMENT (OUTPATIENT)
Dept: PHYSICAL THERAPY | Facility: CLINIC | Age: 51
End: 2021-12-29
Attending: ORTHOPAEDIC SURGERY
Payer: COMMERCIAL

## 2021-12-29 VITALS
HEART RATE: 87 BPM | WEIGHT: 280.2 LBS | DIASTOLIC BLOOD PRESSURE: 87 MMHG | TEMPERATURE: 98.6 F | OXYGEN SATURATION: 97 % | SYSTOLIC BLOOD PRESSURE: 128 MMHG | HEIGHT: 76 IN | RESPIRATION RATE: 16 BRPM | BODY MASS INDEX: 34.12 KG/M2

## 2021-12-29 LAB
ALBUMIN SERPL-MCNC: 2.6 G/DL (ref 3.4–5)
ALP SERPL-CCNC: 66 U/L (ref 40–150)
ALT SERPL W P-5'-P-CCNC: 34 U/L (ref 0–70)
AST SERPL W P-5'-P-CCNC: 20 U/L (ref 0–45)
ATRIAL RATE - MUSE: 95 BPM
BACTERIA WND CULT: NO GROWTH
BILIRUB DIRECT SERPL-MCNC: 0.1 MG/DL (ref 0–0.2)
BILIRUB SERPL-MCNC: 0.5 MG/DL (ref 0.2–1.3)
CREAT SERPL-MCNC: 0.69 MG/DL (ref 0.66–1.25)
DIASTOLIC BLOOD PRESSURE - MUSE: NORMAL MMHG
GFR SERPL CREATININE-BSD FRML MDRD: >90 ML/MIN/1.73M2
GLUCOSE BLD-MCNC: 105 MG/DL (ref 70–99)
HGB BLD-MCNC: 10.1 G/DL (ref 13.3–17.7)
INTERPRETATION ECG - MUSE: NORMAL
P AXIS - MUSE: 40 DEGREES
PR INTERVAL - MUSE: 134 MS
PROT SERPL-MCNC: 6.7 G/DL (ref 6.8–8.8)
QRS DURATION - MUSE: 146 MS
QT - MUSE: 364 MS
QTC - MUSE: 457 MS
R AXIS - MUSE: 20 DEGREES
SYSTOLIC BLOOD PRESSURE - MUSE: NORMAL MMHG
T AXIS - MUSE: 42 DEGREES
VENTRICULAR RATE- MUSE: 95 BPM

## 2021-12-29 PROCEDURE — 97530 THERAPEUTIC ACTIVITIES: CPT | Mod: GP | Performed by: PHYSICAL THERAPY ASSISTANT

## 2021-12-29 PROCEDURE — 99232 SBSQ HOSP IP/OBS MODERATE 35: CPT | Performed by: INTERNAL MEDICINE

## 2021-12-29 PROCEDURE — 97116 GAIT TRAINING THERAPY: CPT | Mod: GP | Performed by: PHYSICAL THERAPY ASSISTANT

## 2021-12-29 PROCEDURE — 85018 HEMOGLOBIN: CPT | Performed by: ORTHOPAEDIC SURGERY

## 2021-12-29 PROCEDURE — 82947 ASSAY GLUCOSE BLOOD QUANT: CPT | Performed by: INTERNAL MEDICINE

## 2021-12-29 PROCEDURE — 250N000009 HC RX 250: Performed by: SPECIALIST

## 2021-12-29 PROCEDURE — 250N000013 HC RX MED GY IP 250 OP 250 PS 637: Performed by: INTERNAL MEDICINE

## 2021-12-29 PROCEDURE — 272N000450 HC KIT 4FR POWER PICC SINGLE LUMEN

## 2021-12-29 PROCEDURE — 36415 COLL VENOUS BLD VENIPUNCTURE: CPT | Performed by: ORTHOPAEDIC SURGERY

## 2021-12-29 PROCEDURE — 80076 HEPATIC FUNCTION PANEL: CPT | Performed by: SPECIALIST

## 2021-12-29 PROCEDURE — 250N000011 HC RX IP 250 OP 636: Performed by: SPECIALIST

## 2021-12-29 PROCEDURE — 250N000013 HC RX MED GY IP 250 OP 250 PS 637: Performed by: ORTHOPAEDIC SURGERY

## 2021-12-29 PROCEDURE — 97110 THERAPEUTIC EXERCISES: CPT | Mod: GP | Performed by: PHYSICAL THERAPY ASSISTANT

## 2021-12-29 PROCEDURE — 82565 ASSAY OF CREATININE: CPT | Performed by: INTERNAL MEDICINE

## 2021-12-29 PROCEDURE — 36569 INSJ PICC 5 YR+ W/O IMAGING: CPT

## 2021-12-29 RX ORDER — ACETAMINOPHEN 325 MG/1
650 TABLET ORAL EVERY 4 HOURS PRN
Qty: 60 TABLET | Refills: 0 | Status: ON HOLD | OUTPATIENT
Start: 2021-12-30 | End: 2022-09-21

## 2021-12-29 RX ORDER — RIFAMPIN 300 MG/1
300 CAPSULE ORAL 2 TIMES DAILY
Qty: 84 CAPSULE | Refills: 0 | Status: SHIPPED | OUTPATIENT
Start: 2021-12-29 | End: 2022-02-09

## 2021-12-29 RX ORDER — AMOXICILLIN 250 MG
1 CAPSULE ORAL 2 TIMES DAILY
Qty: 60 TABLET | Refills: 0 | Status: ON HOLD | OUTPATIENT
Start: 2021-12-29 | End: 2022-09-19

## 2021-12-29 RX ORDER — OXYCODONE HYDROCHLORIDE 5 MG/1
5 TABLET ORAL EVERY 4 HOURS PRN
Qty: 30 TABLET | Refills: 0 | Status: ON HOLD | OUTPATIENT
Start: 2021-12-29 | End: 2022-09-19

## 2021-12-29 RX ORDER — CELECOXIB 200 MG/1
200 CAPSULE ORAL 2 TIMES DAILY
Qty: 30 CAPSULE | Refills: 0 | Status: ON HOLD | OUTPATIENT
Start: 2021-12-29 | End: 2022-09-21

## 2021-12-29 RX ORDER — ASPIRIN 325 MG
325 TABLET, DELAYED RELEASE (ENTERIC COATED) ORAL DAILY
Qty: 45 TABLET | Refills: 0 | Status: ON HOLD | OUTPATIENT
Start: 2021-12-29 | End: 2022-09-21

## 2021-12-29 RX ADMIN — POLYETHYLENE GLYCOL 3350 17 G: 17 POWDER, FOR SOLUTION ORAL at 07:50

## 2021-12-29 RX ADMIN — ASPIRIN 325 MG: 325 TABLET, COATED ORAL at 07:50

## 2021-12-29 RX ADMIN — LIDOCAINE HYDROCHLORIDE 2 ML: 10 INJECTION, SOLUTION EPIDURAL; INFILTRATION; INTRACAUDAL; PERINEURAL at 14:17

## 2021-12-29 RX ADMIN — ACETAMINOPHEN 975 MG: 325 TABLET, FILM COATED ORAL at 14:29

## 2021-12-29 RX ADMIN — ATORVASTATIN CALCIUM 40 MG: 20 TABLET, FILM COATED ORAL at 07:50

## 2021-12-29 RX ADMIN — SENNOSIDES AND DOCUSATE SODIUM 1 TABLET: 50; 8.6 TABLET ORAL at 07:50

## 2021-12-29 RX ADMIN — CEFAZOLIN SODIUM 2 G: 2 INJECTION, SOLUTION INTRAVENOUS at 14:28

## 2021-12-29 RX ADMIN — CEFAZOLIN SODIUM 2 G: 2 INJECTION, SOLUTION INTRAVENOUS at 05:26

## 2021-12-29 RX ADMIN — ACETAMINOPHEN 975 MG: 325 TABLET, FILM COATED ORAL at 05:32

## 2021-12-29 ASSESSMENT — ACTIVITIES OF DAILY LIVING (ADL)
ADLS_ACUITY_SCORE: 3
ADLS_ACUITY_SCORE: 5
ADLS_ACUITY_SCORE: 3
ADLS_ACUITY_SCORE: 5
ADLS_ACUITY_SCORE: 3
ADLS_ACUITY_SCORE: 5
ADLS_ACUITY_SCORE: 3
ADLS_ACUITY_SCORE: 5
ADLS_ACUITY_SCORE: 3

## 2021-12-29 NOTE — DISCHARGE INSTRUCTIONS
[facility] will be following you for Skilled RN & PT.  [facility] will call you will your scheduled home visit.  Please call [phone] if you have any questions or concerns.

## 2021-12-29 NOTE — PROGRESS NOTES
"A&OX4. Pleasant. SBA. Denies pain. Eating and drinking well. No N/V/D. R hip dressing and wound vac patent. Will discharge home this evening once PICC is placed. No fever. AVSS. Latest Vitals: Blood pressure 128/87, pulse 87, temperature 98.6  F (37  C), temperature source Oral, resp. rate 16, height 1.93 m (6' 4\"), weight 127.1 kg (280 lb 3.3 oz), SpO2 97 %.      "

## 2021-12-29 NOTE — CONSULTS
Care Management Initial Consult    General Information  Assessment completed with: Patient,  (Aba)  Type of CM/SW Visit: Initial Assessment    Primary Care Provider verified and updated as needed: Yes   Readmission within the last 30 days: no previous admission in last 30 days      Reason for Consult: discharge planning  Advance Care Planning:            Communication Assessment  Patient's communication style: spoken language (English or Bilingual)    Hearing Difficulty or Deaf: no   Wear Glasses or Blind: no    Cognitive  Cognitive/Neuro/Behavioral: WDL                      Living Environment:   People in home: spouse   (Demi)  Current living Arrangements: house      Able to return to prior arrangements:         Family/Social Support:  Care provided by:    Provides care for:    Marital Status:   Wife,Children          Description of Support System:           Current Resources:   Patient receiving home care services: No     Community Resources: None  Equipment currently used at home: cane, straight,walker, rolling  Supplies currently used at home:      Employment/Financial:  Employment Status:          Financial Concerns:             Lifestyle & Psychosocial Needs:  Social Determinants of Health     Tobacco Use: Low Risk      Smoking Tobacco Use: Never Smoker     Smokeless Tobacco Use: Never Used   Alcohol Use: Not on file   Financial Resource Strain: Not on file   Food Insecurity: Not on file   Transportation Needs: Not on file   Physical Activity: Not on file   Stress: Not on file   Social Connections: Not on file   Intimate Partner Violence: Not on file   Depression: Not on file   Housing Stability: Not on file       Functional Status:  Prior to admission patient needed assistance:              Mental Health Status:          Chemical Dependency Status:                Values/Beliefs:  Spiritual, Cultural Beliefs, Yarsanism Practices, Values that affect care:                 Additional Information:  Per  chart review indicating pt will need IV abx at discharge and consult for discharge planning, met with pt to discuss discharge plan.  Discussed that [facility] will see pt for home IV abx and [facility] will follow for Skilled RN & PT.  Patient stated understanding and been informed by Alexandra Lane RN with [facility] regarding instruction of IV abx administration and insurance coverage.  Patient stated understanding of Prevena Wound VAC and discharging home with this and his scheduled Ortho Provider appt in January to have this removed.  Informed patient that is scheduled to see Dr. Agustin at [facility] on January 13th at 1045.    Care Management Discharge Note    Discharge Date: 12/29/2021       Discharge Disposition: Home,Home Care,Home Infusion    Discharge Services: None    Discharge DME: Wound Vac    Discharge Transportation: family or friend will provide    Private pay costs discussed: Not applicable    PAS Confirmation Code:    Patient/family educated on Medicare website which has current facility and service quality ratings: no    Education Provided on the Discharge Plan:    Persons Notified of Discharge Plans: bedside RN, Alexandra Allen RN [facility]  Patient/Family in Agreement with the Plan: yes    Handoff Referral Completed: No    Additional Information:  Patient ready to discharge home with IV abx through [facility] and [facility] RN & PT.  Patient stated that his spouse will be transporting him home today.    Missy Hernandez, RN  Missy Hernandez RN, BSN, OCN   Inpatient Care Coordination [address]  Office: [phone]

## 2021-12-29 NOTE — PLAN OF CARE
Patient vital signs are at baseline: Yes  Patient able to ambulate as they were prior to admission or with assist devices provided by therapies during their stay:  Yes  Patient MUST void prior to discharge:  Yes  Patient able to tolerate oral intake:  Yes  Pain has adequate pain control using Oral analgesics:  Yes    Pt received discharge instructions and medications, left with wife via personal vehicle. Received IV education prior to discharge.

## 2021-12-29 NOTE — PROGRESS NOTES
"ORTHOPEDIC LOWER EXTREMITY PROGRESS NOTE    POD#2  Patient is a 51 year old male who underwent Procedure(s):  RIGHT HIP IRRIGATION AND DEBRIDEMENT WITH POLY AND HEAD EXCHANGE 2021. Pain is well controlled. Tolerating medication well, no nausea or vomiting.  Overall doing well, no complaints.  Voiding well, passing gas, anxious to have a bowel movement as now it has been three days, discussed suppository but would like to hold off on that for now.    Vitals:   Blood pressure 118/76, pulse 81, temperature 97.9  F (36.6  C), temperature source Oral, resp. rate 16, height 1.93 m (6' 4\"), weight 127.1 kg (280 lb 3.3 oz), SpO2 98 %.  Temp (24hrs), Av.7  F (37.1  C), Min:98.5  F (36.9  C), Max:99  F (37.2  C)      Drains: prevena wound vac in place with empty cannister    EXAM   The patient is awake and alert.  Calves are soft and non-tender.   Sensation is intact.  Dorsiflexion and plantar flexion is intact.  Foot warm with nl cap refill.  The incision is covered with prevena wound vac    Labs:   Recent Labs   Lab Test 21  0753 21   HGB 10.6* 11.9*   INR  --  1.14     CX: Blood cxs at outside hospital positive with gram positive cocci pending identification  Blood cultures taken here showing NGTD  Hip cultures: with staph aureus    ASSESSMENT  S/p  R hip I&D with head and liner exchange     PLAN  1. DVT prophylaxis: aspirin  2. Weight Bearing: WBAT (Weight bearing as tolerated).  3. Anticipated discharge date after PICC, possibly today. Discharge to Home with Home Carefor IV abx therapy  4. Cont Pain Control Oxycodone and Tylenol   5. No dressing change, leave Pravena wound vac in place for a total of 14 days until post-op on 1/10/22.  6. appreciate ID assistance    Kjerstin Foss PA-C [facility]          "

## 2021-12-29 NOTE — PROGRESS NOTES
Updated benefits- Pt has coverage for iv abx through their BCBS plan. Pt has an 80/20 coverage. OOP is $7500 (met 4541.66). Once OOP has been met pt should be covered at 100%.         ([facility]) In reference to admission date 12/26/2021 to check for iv abx coverage.         Please contact Intake with any questions, [phone] or In Basket pool, [facility] ([phone]).

## 2021-12-29 NOTE — PROGRESS NOTES
[facility]    Infectious Disease Progress Note    Date of Service : 12/29/2021     Assessment:  51 year old male with right total hip arthroplasty, who is admitted right total hip arthroplasty infection and sepsis related to methicllin sensitive S.aurues. Blood cxs from 12/25 at outside hospital were positive. .s/p I&D with poly and head exchange, OR cxs also growing S.aureus     Recommendations:  1. IV Cefazolin 2 grams Q8H  2. Add Rifampin 300 mg  BID (isolate is sensitive with MI C < 0.5), check baseline LFTs. Please provide prescription for oral Rifampin X 6 weeks at discharge  3. Follow blood cxs and OR cxs  4. Plan PICC   5. 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 Remained afebrile, pain controlled    Physical Exam   Temp: 98.6  F (37  C) Temp src: Oral BP: 128/87 Pulse: 87   Resp: 16 SpO2: 97 % O2 Device: None (Room air)    Vitals:    12/26/21 2016   Weight: 127.1 kg (280 lb 3.3 oz)     Vital Signs with Ranges  Temp:  [97.9  F (36.6  C)-99.3  F (37.4  C)] 98.6  F (37  C)  Pulse:  [79-87] 87  Resp:  [16-18] 16  BP: (105-128)/(75-87) 128/87  SpO2:  [95 %-98 %] 97 %    Constitutional: Awake, alert, cooperative, no apparent distress  Lungs: Clear to auscultation bilaterally, no crackles or wheezing  Cardiovascular: Regular rate and rhythm, normal S1 and S2, and no murmur noted  Abdomen: Normal bowel sounds, soft, non-distended, non-tender  Skin: No rashes, no cyanosis, no edema   MS :  Surgical site intact    Other:    Medications     lactated ringers 100 mL/hr at 12/27/21 1901       acetaminophen  975 mg Oral Q8H HAYDEE     aspirin  325 mg Oral Daily     atorvastatin  40 mg Oral Daily     ceFAZolin  2 g Intravenous Q8H     polyethylene glycol   17 g Oral Daily     senna-docusate  1 tablet Oral BID     sodium chloride (PF)  3 mL Intracatheter Q8H       Data   All microbiology laboratory data reviewed.  Recent Labs   Lab Test 12/29/21 0826 12/28/21 0753 12/26/21 2016   WBC  --   --  10.6   HGB 10.1* 10.6* 11.9*   HCT  --   --  36.1*   MCV  --   --  89   PLT  --   --  226     Recent Labs   Lab Test 12/29/21 0826 12/28/21 0753 12/27/21  1203   CR 0.69 0.82 0.86     Recent Labs   Lab Test 12/26/21 2016   SED 67*     Microbiology  12/26 Blood cxs negative  12/27 R hio wound  Hip, Right; Wound          0 Result Notes    Culture 3+ Staphylococcus aureus Abnormal

## 2021-12-29 NOTE — PROGRESS NOTES
Home Infusion  Aba will be discharging today and going home on IV cefazolin q8.  I met with Aba and Demi  at bedside and instructed in IV push administration via SL PICC line with SAS flushing.   Had Demi perform hands on with practice equipment and teaching sheets. Provided information about supplies and supply delivery, storage of medication, checking of label, dosing times, plan for SNV and 24/7 availability of [facility] staff. [facility] (P: [phone] F:[phone]) will follow for home RN, PT, OT.   Aba and Demi verbalized understanding of information given. Demi demonstrated very good technique with practice and verbalized good understanding of process.  They stated they feel comfortable with administering abx later tonight and denied the need for home RN for first home dose.   Dosing schedule: Aba is currently dosing 0600, 1400, 2200h and he would like to remain at that schedule once at home.   Delivery: Medication and supplies will be delivered to pt's home around 6pm.   Aba is ready for discharge from [facility] perspective.    Alexandra Lane RN  [facility] Liaison  [phone] (M-F 8a-5p)  [phone] Office

## 2021-12-29 NOTE — PROCEDURES
[facility]    Single Lumen PICC Placement    Date/Time: 12/29/2021 2:34 PM  Performed by: Monet Rodriguez RN  Authorized by: Suzy Agustin MD   Indications: vascular access      UNIVERSAL PROTOCOL   Site Marked: Yes  Prior Images Obtained and Reviewed:  NA  Required items: Required blood products, implants, devices and special equipment available    Patient identity confirmed:  Verbally with patient, arm band, provided demographic data and hospital-assigned identification number  NA - No sedation, light sedation, or local anesthesia  Confirmation Checklist:  Patient's identity using two indicators, correct equipment/implants were available, relevant allergies and procedure was appropriate and matched the consent or emergent situation  Time out: Immediately prior to the procedure a time out was called    Universal Protocol: the Joint Commission Universal Protocol was followed    Preparation: Patient was prepped and draped in usual sterile fashion    ESBL (mL):  2     ANESTHESIA    Local Anesthetic: Lidocaine 1% without epinephrine  Anesthetic Total (mL):  2      SEDATION    Patient Sedated: No        Preparation: skin prepped with ChloraPrep  Skin prep agent: skin prep agent completely dried prior to procedure  Sterile barriers: maximum sterile barriers were used: cap, mask, sterile gown, sterile gloves, and large sterile sheet  Hand hygiene: hand hygiene performed prior to central venous catheter insertion  Type of line used: Power PICC  Catheter type: single lumen  Lumen type: valved  Catheter size: 4 Fr  Brand: Bard  Placement method: ultrasound  Number of attempts: 1  Successful placement: yes  Orientation: right  Location: basilic vein  Arm circumference: adults 10 cm  Extremity circumference: 35  Visible catheter length: 6  Total catheter length: 50  Dressing and securement: chlorhexidine disc applied, securement device and dressing applied  Post procedure assessment: blood return  through all ports  PROCEDURE   Patient Tolerance:  Patient tolerated the procedure well with no immediate complications   picc placed without difficulty/picc ok to use

## 2021-12-29 NOTE — PROGRESS NOTES
[facility]    Hospitalist Progress Note    Date of Service (when I saw the patient): 12/29/2021  Admit date: 12/26/2021    Assessment & Plan   Aba Gan is a 51 year old male with pmhx HLD and right total hip arthroplasty who is a direct admission under the care of orthopedics service after presenting to outside facility with R hip pain, fevers, and chills on 12/25 found to have gram positive cocci bacteremia. Hospitalist service is consulted for empiric antibiotic recommendations and preoperative clearance.     Sepsis 2/2 gram + cocci bacteremia  Septic R hip arthroplasty - S/p RHA 6 weeks prior to admission.   Returned positive 12/26 in 2/2 blood cultures ([place] [facility] from 12/25). Fever (103.2 F), tachycardia, leukocytosis 15 at [facility]. CT right hip reportedly no significant effusion present, no fractures evident.  * EKG reviewed: NSR with RBBB without acute ischemic changes.   * CXR clear   S/p I and D of RHA with head and liner exchange on 12/27/21    - Admitted by and post-operative care and discharge per ortho.   - ID followed throughout his stay.   - BCx from outside hospital grew MSSA  - BCx here NGTD, starting with admission 12/26   - Vanco changed to cefazolin 2 g IV q 8 hours on 12/28/21. Followed by oral suppressive therapy for total of 3 months.   - Added rifampin 300 mg BID x 6 weeks to discharge orders per ID recommendations.  Isolate is sensitive to rifampin with MI C < 0.5. Baseline liver panel normal.   - ID will follow up with patient on 1/13.   - Pain controlled no recent oxycodone use.   - DVT prophylaxis per ortho. On ASA     Acute blood loss anemia from this surgery and prior.   - hemodynamically stable, asymptomatic  - Follow up with PCP in a month.   Recent Labs   Lab 12/29/21  0826 12/28/21  0753 12/26/21 2016   HGB 10.1* 10.6* 11.9*       Dry cough   Triple vaccinated for COVID19, received flu vaccine. Negative COVID [facility] 12/25. Mostly dry cough x 5 days,  fever.  * CXR clear     HLD.  - Cont atrovastin. This should be taken at the same time as rifampin.      LINDSEY.  Not on treatment.  Diet: Orders Placed This Encounter      Advance Diet as Tolerated: Regular Diet Adult      Discharge Instruction - Regular Diet Adult       Disposition: Expected discharge once PICC line placed and home IV abx determined.   Communication: Discussed with patient and wife on 12/28/21      Netta Delarosa  Hospitalist Service  [facility]  Securely message with the Vocera Web Console (learn more here)  Text page via [employer] Paging/Directory    Interval History   Events over last 24 hours as outlined above.   Patient doing well. Up and walking in room. Pain controlled Anxious to get home.   Offers no concerns.   No CP, SOB, N/V/D.     -Data reviewed today: I reviewed all new labs and imaging results over the last 24 hours. I personally reviewed the EKG tracing showing as above.  and the chest x-ray image(s) showing as above. .    Physical Exam   Temp: 98.6  F (37  C) Temp src: Oral BP: 128/87 Pulse: 87   Resp: 16 SpO2: 97 % O2 Device: None (Room air)    Vitals:    12/26/21 2016   Weight: 127.1 kg (280 lb 3.3 oz)     Vital Signs with Ranges  Temp:  [97.9  F (36.6  C)-98.7  F (37.1  C)] 98.6  F (37  C)  Pulse:  [79-87] 87  Resp:  [16-18] 16  BP: (105-128)/(75-87) 128/87  SpO2:  [95 %-98 %] 97 %  I/O last 3 completed shifts:  In: -   Out: 1390 [Urine:1390]    Today's Exam  Constitutional:  NAD,   Neuropsyche:  Very pleasant affect, alert and oriented, answers questions appropriately. Speech normal, face symmetric and moving all 4 extremities without gross focal neurological deficit.   Respiratory:  Breathing comfortably, good air exchange, no wheezes, no crackles.   Cardiovascular:  Regular rate and rhythm, no edema.  GI:  soft, NT/ND, BS normal  Skin/Integumen:  No acute rash or sign of bleeding.     Medications   All medications reviewed on 12/29/21     lactated ringers  100 mL/hr at 12/27/21 1901       acetaminophen  975 mg Oral Q8H [facility]     aspirin  325 mg Oral Daily     atorvastatin  40 mg Oral Daily     ceFAZolin  2 g Intravenous Q8H     polyethylene glycol  17 g Oral Daily     senna-docusate  1 tablet Oral BID     sodium chloride (PF)  10-40 mL Intracatheter Q8H     sodium chloride (PF)  3 mL Intracatheter Q8H       Data   Recent Labs   Lab 12/29/21  0826 12/28/21  0753 12/27/21  1203 12/26/21 2016   WBC  --   --   --  10.6   HGB 10.1* 10.6*  --  11.9*   MCV  --   --   --  89   PLT  --   --   --  226   INR  --   --   --  1.14   NA  --   --   --  135   POTASSIUM  --   --   --  4.0   CHLORIDE  --   --   --  106   CO2  --   --   --  25   BUN  --   --   --  14   CR 0.69 0.82 0.86 0.87   ANIONGAP  --   --   --  4   SCOUT  --   --   --  8.5   * 109*  --  112*   ALBUMIN 2.6*  --   --   --    PROTTOTAL 6.7*  --   --   --    BILITOTAL 0.5  --   --   --    ALKPHOS 66  --   --   --    ALT 34  --   --   --    AST 20  --   --   --        No results found for this or any previous visit (from the past 24 hour(s)).

## 2021-12-29 NOTE — PLAN OF CARE
Alert and oriented x 4. Denies pain. Plan to continue  IV abx x 6 weeks. PICC consult done per previous RN. Wound vac intact. VSS on RA this shitf. SBA with ambulation. Voiding  well. To continue to monitor.

## 2021-12-29 NOTE — PROGRESS NOTES
[facility]    I spoke with Aba and we plan on doing IV education at bedside once line is placed. He anticipates that he wife will be present for IV education as well. [facility] has set Aba up with [facility] (P: [phone] F:[phone]) for home RN and PT.    Please ensure that Aba receives his 1400h cefazolin infusion prior to discharge because [facility] will not be able to deliver medication/supplies to pt's home until 1800h.     Thank you for the referral    Alexandra Lane RN  [facility] Liaison  [phone] (Mon thru Fri 8am - 5pm)  [phone] Office

## 2021-12-30 LAB — BACTERIA WND CULT: ABNORMAL

## 2021-12-31 LAB
BACTERIA BLD CULT: NO GROWTH
BACTERIA BLD CULT: NO GROWTH

## 2022-01-01 LAB
BACTERIA SNV CULT: NO GROWTH
GRAM STAIN RESULT: NORMAL
GRAM STAIN RESULT: NORMAL

## 2022-01-02 NOTE — OP NOTE
Procedure Date: 12/27/2021    PREOPERATIVE DIAGNOSIS:  Acute infection 6 weeks status post total hip arthroplasty.    POSTOPERATIVE DIAGNOSIS:  Acute infection 6 weeks status post total hip arthroplasty.    PROCEDURE:  Right hip irrigation and debridement, complete synovectomy.  Exchange of polyethylene liner and femoral head.    INDICATIONS FOR PROCEDURE:  This is a 51-year-old male who underwent total hip arthroplasty approximately 6 weeks ago.  He developed increasing and acute pain in the last 20-36 hours' time and was seen in an outside facility.  Blood cultures were obtained which have grown out gram-positive cocci.  In addition, the patient did have an aspiration of his hip performed today and radiology was unable to obtain significant amount of fluid from the hip joint itself.  I had a long discussion with the patient.  His exam is relatively benign, but given his positive blood cultures and the fact that he developed significantly increased pain in his hip, which was a change, I believe it would be mostly prudent to proceed with an irrigation and debridement of the right hip.  I discussed risks and benefits of proceeding with this and he wished to proceed.    DESCRIPTION OF PROCEDURE:  The patient was then brought to the operating room and after satisfactory anesthesia, was placed in the Champion bed.  The right lower extremity was prepped and draped in the usual sterile fashion.  The previously made incision was opened.  Dissection was carried down to the fascia.  Fascia was identified and there did not appear to be a sign of acute infection.  A fascial incision was made and then the interval between TFL (tensor fascia amairani) and rectus was identified at that point a pocket of purulent material was identified.  This was evacuated.  Cultures were obtained.    Based upon this finding, it was elected to proceed with the full irrigation and debridement, synovectomy and poly and head exchange.  Thus the hip was  exposed and after exposing the hip, the capsule was excised.  A thorough synovectomy was performed.  Any infected-appearing material was removed.  The hip was dislocated.  The polyethylene liner as well as the ceramic head were removed.  Again, a thorough and complete irrigation and debridement was performed of any infected-appearing material.  The tissues actually looked quite viable in general.  Multiple liters of antibiotic irrigation as well as multiple liters of saline were placed.  Wound was thoroughly irrigated.  A new liner was impacted into place, 62 mm neutral liner, and a new ceramic head was impacted into place as well.  This was a +1.5 head, which had previously been used.  The hip was reduced.  Hip again was thoroughly irrigated.  Two grams of vancomycin powder were placed.  The fascia was closed with a running Stratafix suture.  Interrupted PDS and a Monocryl suture were utilized followed by interrupted nylon sutures as well.  Hemostasis obtained prior to closure.  A Prevena incisional wound VAC was placed as well.  The patient left the operating room in satisfactory condition.     SURGEON:  Eric Wang MD    ASSISTANT:  ABHINAV Hanna MD        D: 2021   T: 2021   MT: [id number]    Name:     CORONA PETERS  MRN:      [id number]        Account:        [id number]   :      1970           Procedure Date: 2021     Document: [id number]

## 2022-01-03 LAB
BACTERIA WND CULT: NORMAL
BACTERIA WND CULT: NORMAL

## 2022-01-05 ENCOUNTER — HOME INFUSION (PRE-WILLOW HOME INFUSION) (OUTPATIENT)
Dept: PHARMACY | Facility: CLINIC | Age: 52
End: 2022-01-05
Payer: COMMERCIAL

## 2022-01-06 ENCOUNTER — HOME INFUSION (PRE-WILLOW HOME INFUSION) (OUTPATIENT)
Dept: PHARMACY | Facility: CLINIC | Age: 52
End: 2022-01-06
Payer: COMMERCIAL

## 2022-01-10 LAB — BACTERIA SNV CULT: NORMAL

## 2022-01-10 NOTE — DISCHARGE SUMMARY
"Discharge Summary    Aba Gan MRN# [id number]   YOB: 1970 Age: 51 year old     Date of Admission: 12/26/2021    Date of Discharge: 12/29/2021    Reason for Admission: Aba Gan is an 51 year old male who was admitted to the hospital for acute right hip pain approximately 6 weeks status post right total hip arthroplasty. Blood cultures were obtained which grew out gram-positive cocci. He underwent a subsequent right hip irrigation and debridement with complete synovectomy and exchange of polyethylene liner and femoral head.    Preoperative Diagnosis: Post-operative infection [T81.40XA]    Postoperative Diagnosis: Post-operative infection [T81.40XA]    Procedure Completed:  Right hip irrigation and debridement, complete synovectomy. Exchange of polyethylene liner and femoral head.    Hospital Course:  Mr. Gan was admitted to the hospital and subsequently underwent the above procedure. The patient tolerated the procedure well. There were no complications. Following surgery he returned to the floor.  During his stay he did not require any blood transfusions. His pain was controlled with oral pain medication.  During his stay he progressed well in physical therapy and all the therapy goals were met. He was followed by ID, who changed his IV antibiotics from Vancomycin to Cefazolin on 12/28/21. A PICC line was placed on 12/29/21, and he and his wife received appropriate education for ABX administration, PICC line care, and wound care.     Discharge Physical Exam:  /87 (BP Location: Right arm)   Pulse 87   Temp 98.6  F (37  C) (Oral)   Resp 16   Ht 1.93 m (6' 4\")   Wt 127.1 kg (280 lb 3.3 oz)   SpO2 97%   BMI 34.11 kg/m    Neurovascularly intact, distal pulses present bilaterally.  Calves are negative bilaterally, both soft and nontender.    Assessment: Mr. Gan is stable and doing well status post right hip irrigation and debridement, complete synovectomy, and xchange of polyethylene liner " and femoral head.    Plan: We will discharge him home on analgesics and deep venous thrombosis prophylaxis. He has a PICC line for IV ABX administration, and a wound vac. He will receive IV Cefazolin and Rifampin PO for 6 weeks post-operatively per ID recommendations, as well as oral suppressive antibiotics x 3 months. He will also receive home PT and RN care.  He will follow-up with me approximately 2 weeks from surgery for a wound check and for removal of the wound vac. He will call our office prior with any concerns.     Meds:     Medication List      Started    acetaminophen 325 MG tablet  Commonly known as: TYLENOL  650 mg, Oral, EVERY 4 HOURS PRN     aspirin 325 MG EC tablet  Commonly known as: ASA  325 mg, Oral, DAILY  Replaces: aspirin 81 MG chewable tablet     ceFAZolin-dextrose 2-4 GM/100ML-% Soln infusion  Commonly known as: ANCEF  2 g, Intravenous, EVERY 8 HOURS     celecoxib 200 MG capsule  Commonly known as: celeBREX  200 mg, Oral, 2 TIMES DAILY     oxyCODONE 5 MG tablet  Commonly known as: ROXICODONE  5 mg, Oral, EVERY 4 HOURS PRN     rifampin 300 MG capsule  Commonly known as: RIFADIN  300 mg, Oral, 2 TIMES DAILY, X 6 weeks.     senna-docusate 8.6-50 MG tablet  Commonly known as: SENOKOT-S/PERICOLACE  1 tablet, Oral, 2 TIMES DAILY        Discontinued    aspirin 81 MG chewable tablet  Commonly known as: ASA  Replaced by: aspirin 325 MG EC tablet     cefuroxime 500 MG tablet  Commonly known as: CEFTIN

## 2022-01-11 ENCOUNTER — LAB (OUTPATIENT)
Dept: LAB | Facility: CLINIC | Age: 52
End: 2022-01-11
Payer: COMMERCIAL

## 2022-01-11 DIAGNOSIS — R78.81 BACTEREMIA: Primary | ICD-10-CM

## 2022-01-11 LAB
BASOPHILS # BLD AUTO: 0.1 10E3/UL (ref 0–0.2)
BASOPHILS NFR BLD AUTO: 1 %
EOSINOPHIL # BLD AUTO: 0.2 10E3/UL (ref 0–0.7)
EOSINOPHIL NFR BLD AUTO: 3 %
ERYTHROCYTE [DISTWIDTH] IN BLOOD BY AUTOMATED COUNT: 12.4 % (ref 10–15)
HCT VFR BLD AUTO: 34.4 % (ref 40–53)
HGB BLD-MCNC: 11.4 G/DL (ref 13.3–17.7)
IMM GRANULOCYTES # BLD: 0 10E3/UL
IMM GRANULOCYTES NFR BLD: 0 %
LYMPHOCYTES # BLD AUTO: 1.8 10E3/UL (ref 0.8–5.3)
LYMPHOCYTES NFR BLD AUTO: 28 %
MCH RBC QN AUTO: 29.1 PG (ref 26.5–33)
MCHC RBC AUTO-ENTMCNC: 33.1 G/DL (ref 31.5–36.5)
MCV RBC AUTO: 88 FL (ref 78–100)
MONOCYTES # BLD AUTO: 0.8 10E3/UL (ref 0–1.3)
MONOCYTES NFR BLD AUTO: 12 %
NEUTROPHILS # BLD AUTO: 3.7 10E3/UL (ref 1.6–8.3)
NEUTROPHILS NFR BLD AUTO: 57 %
PLATELET # BLD AUTO: 436 10E3/UL (ref 150–450)
RBC # BLD AUTO: 3.92 10E6/UL (ref 4.4–5.9)
WBC # BLD AUTO: 6.6 10E3/UL (ref 4–11)

## 2022-01-11 PROCEDURE — 82565 ASSAY OF CREATININE: CPT

## 2022-01-11 PROCEDURE — 36415 COLL VENOUS BLD VENIPUNCTURE: CPT

## 2022-01-11 PROCEDURE — 86140 C-REACTIVE PROTEIN: CPT

## 2022-01-11 PROCEDURE — 84520 ASSAY OF UREA NITROGEN: CPT

## 2022-01-11 PROCEDURE — 85025 COMPLETE CBC W/AUTO DIFF WBC: CPT

## 2022-01-12 LAB
BUN SERPL-MCNC: 12 MG/DL (ref 7–30)
CREAT SERPL-MCNC: 0.66 MG/DL (ref 0.66–1.25)
CRP SERPL-MCNC: 3.8 MG/L (ref 0–8)
GFR SERPL CREATININE-BSD FRML MDRD: >90 ML/MIN/1.73M2

## 2022-01-13 ENCOUNTER — HOME INFUSION (PRE-WILLOW HOME INFUSION) (OUTPATIENT)
Dept: PHARMACY | Facility: CLINIC | Age: 52
End: 2022-01-13
Payer: COMMERCIAL

## 2022-01-15 ENCOUNTER — HOME INFUSION (PRE-WILLOW HOME INFUSION) (OUTPATIENT)
Dept: PHARMACY | Facility: CLINIC | Age: 52
End: 2022-01-15
Payer: COMMERCIAL

## 2022-01-18 ENCOUNTER — LAB (OUTPATIENT)
Dept: LAB | Facility: CLINIC | Age: 52
End: 2022-01-18
Payer: COMMERCIAL

## 2022-01-18 DIAGNOSIS — R78.81 BACTEREMIA: Primary | ICD-10-CM

## 2022-01-18 PROCEDURE — 36592 COLLECT BLOOD FROM PICC: CPT

## 2022-01-18 PROCEDURE — 86140 C-REACTIVE PROTEIN: CPT

## 2022-01-18 PROCEDURE — 82565 ASSAY OF CREATININE: CPT

## 2022-01-18 PROCEDURE — 84520 ASSAY OF UREA NITROGEN: CPT

## 2022-01-18 PROCEDURE — 84450 TRANSFERASE (AST) (SGOT): CPT

## 2022-01-19 LAB
AST SERPL W P-5'-P-CCNC: 18 U/L (ref 0–45)
BUN SERPL-MCNC: 10 MG/DL (ref 7–30)
CREAT SERPL-MCNC: 0.68 MG/DL (ref 0.66–1.25)
CRP SERPL-MCNC: <2.9 MG/L (ref 0–8)
GFR SERPL CREATININE-BSD FRML MDRD: >90 ML/MIN/1.73M2

## 2022-01-20 ENCOUNTER — HOME INFUSION (PRE-WILLOW HOME INFUSION) (OUTPATIENT)
Dept: PHARMACY | Facility: CLINIC | Age: 52
End: 2022-01-20

## 2022-01-25 ENCOUNTER — MEDICAL CORRESPONDENCE (OUTPATIENT)
Dept: HEALTH INFORMATION MANAGEMENT | Facility: CLINIC | Age: 52
End: 2022-01-25

## 2022-01-25 ENCOUNTER — LAB (OUTPATIENT)
Dept: LAB | Facility: CLINIC | Age: 52
End: 2022-01-25
Payer: COMMERCIAL

## 2022-01-25 DIAGNOSIS — R78.81 BACTEREMIA: Primary | ICD-10-CM

## 2022-01-25 LAB
BASOPHILS # BLD AUTO: 0 10E3/UL (ref 0–0.2)
BASOPHILS NFR BLD AUTO: 1 %
EOSINOPHIL # BLD AUTO: 0.2 10E3/UL (ref 0–0.7)
EOSINOPHIL NFR BLD AUTO: 4 %
ERYTHROCYTE [DISTWIDTH] IN BLOOD BY AUTOMATED COUNT: 12.7 % (ref 10–15)
HCT VFR BLD AUTO: 38.2 % (ref 40–53)
HGB BLD-MCNC: 12.5 G/DL (ref 13.3–17.7)
IMM GRANULOCYTES # BLD: 0 10E3/UL
IMM GRANULOCYTES NFR BLD: 0 %
LYMPHOCYTES # BLD AUTO: 1.9 10E3/UL (ref 0.8–5.3)
LYMPHOCYTES NFR BLD AUTO: 32 %
MCH RBC QN AUTO: 28.7 PG (ref 26.5–33)
MCHC RBC AUTO-ENTMCNC: 32.7 G/DL (ref 31.5–36.5)
MCV RBC AUTO: 88 FL (ref 78–100)
MONOCYTES # BLD AUTO: 0.5 10E3/UL (ref 0–1.3)
MONOCYTES NFR BLD AUTO: 8 %
NEUTROPHILS # BLD AUTO: 3.2 10E3/UL (ref 1.6–8.3)
NEUTROPHILS NFR BLD AUTO: 56 %
PLATELET # BLD AUTO: 220 10E3/UL (ref 150–450)
RBC # BLD AUTO: 4.36 10E6/UL (ref 4.4–5.9)
WBC # BLD AUTO: 5.8 10E3/UL (ref 4–11)

## 2022-01-25 PROCEDURE — 86140 C-REACTIVE PROTEIN: CPT

## 2022-01-25 PROCEDURE — 85025 COMPLETE CBC W/AUTO DIFF WBC: CPT

## 2022-01-25 PROCEDURE — 82565 ASSAY OF CREATININE: CPT

## 2022-01-25 PROCEDURE — 84520 ASSAY OF UREA NITROGEN: CPT

## 2022-01-25 PROCEDURE — 84450 TRANSFERASE (AST) (SGOT): CPT

## 2022-01-26 ENCOUNTER — HOME INFUSION (PRE-WILLOW HOME INFUSION) (OUTPATIENT)
Dept: PHARMACY | Facility: CLINIC | Age: 52
End: 2022-01-26

## 2022-01-26 LAB — CRP SERPL-MCNC: <2.9 MG/L (ref 0–8)

## 2022-01-27 LAB
AST SERPL W P-5'-P-CCNC: 21 U/L (ref 0–45)
BUN SERPL-MCNC: 11 MG/DL (ref 7–30)
CREAT SERPL-MCNC: 0.75 MG/DL (ref 0.66–1.25)
GFR SERPL CREATININE-BSD FRML MDRD: >90 ML/MIN/1.73M2

## 2022-02-01 ENCOUNTER — LAB (OUTPATIENT)
Dept: LAB | Facility: CLINIC | Age: 52
End: 2022-02-01
Payer: COMMERCIAL

## 2022-02-01 ENCOUNTER — MEDICAL CORRESPONDENCE (OUTPATIENT)
Dept: HEALTH INFORMATION MANAGEMENT | Facility: CLINIC | Age: 52
End: 2022-02-01

## 2022-02-01 DIAGNOSIS — R78.81 BACTEREMIA: Primary | ICD-10-CM

## 2022-02-01 LAB
BASOPHILS # BLD AUTO: 0 10E3/UL (ref 0–0.2)
BASOPHILS NFR BLD AUTO: 1 %
EOSINOPHIL # BLD AUTO: 0.2 10E3/UL (ref 0–0.7)
EOSINOPHIL NFR BLD AUTO: 3 %
ERYTHROCYTE [DISTWIDTH] IN BLOOD BY AUTOMATED COUNT: 12.8 % (ref 10–15)
HCT VFR BLD AUTO: 37.7 % (ref 40–53)
HGB BLD-MCNC: 12.5 G/DL (ref 13.3–17.7)
IMM GRANULOCYTES # BLD: 0 10E3/UL
IMM GRANULOCYTES NFR BLD: 0 %
LYMPHOCYTES # BLD AUTO: 1.9 10E3/UL (ref 0.8–5.3)
LYMPHOCYTES NFR BLD AUTO: 32 %
MCH RBC QN AUTO: 28.8 PG (ref 26.5–33)
MCHC RBC AUTO-ENTMCNC: 33.2 G/DL (ref 31.5–36.5)
MCV RBC AUTO: 87 FL (ref 78–100)
MONOCYTES # BLD AUTO: 0.7 10E3/UL (ref 0–1.3)
MONOCYTES NFR BLD AUTO: 11 %
NEUTROPHILS # BLD AUTO: 3.1 10E3/UL (ref 1.6–8.3)
NEUTROPHILS NFR BLD AUTO: 53 %
PLATELET # BLD AUTO: 244 10E3/UL (ref 150–450)
RBC # BLD AUTO: 4.34 10E6/UL (ref 4.4–5.9)
WBC # BLD AUTO: 5.8 10E3/UL (ref 4–11)

## 2022-02-01 PROCEDURE — 36415 COLL VENOUS BLD VENIPUNCTURE: CPT

## 2022-02-01 PROCEDURE — 84520 ASSAY OF UREA NITROGEN: CPT

## 2022-02-01 PROCEDURE — 86140 C-REACTIVE PROTEIN: CPT

## 2022-02-01 PROCEDURE — 84460 ALANINE AMINO (ALT) (SGPT): CPT

## 2022-02-01 PROCEDURE — 82565 ASSAY OF CREATININE: CPT

## 2022-02-01 PROCEDURE — 85025 COMPLETE CBC W/AUTO DIFF WBC: CPT

## 2022-02-01 PROCEDURE — 84450 TRANSFERASE (AST) (SGOT): CPT

## 2022-02-02 ENCOUNTER — HOME INFUSION (PRE-WILLOW HOME INFUSION) (OUTPATIENT)
Dept: PHARMACY | Facility: CLINIC | Age: 52
End: 2022-02-02

## 2022-02-02 LAB
ALT SERPL W P-5'-P-CCNC: 29 U/L (ref 0–70)
AST SERPL W P-5'-P-CCNC: 17 U/L (ref 0–45)
BUN SERPL-MCNC: 11 MG/DL (ref 7–30)
CREAT SERPL-MCNC: 0.61 MG/DL (ref 0.66–1.25)
CRP SERPL-MCNC: <2.9 MG/L (ref 0–8)
GFR SERPL CREATININE-BSD FRML MDRD: >90 ML/MIN/1.73M2

## 2022-02-08 ENCOUNTER — HOME INFUSION (PRE-WILLOW HOME INFUSION) (OUTPATIENT)
Dept: PHARMACY | Facility: CLINIC | Age: 52
End: 2022-02-08

## 2022-02-08 ENCOUNTER — LAB REQUISITION (OUTPATIENT)
Dept: LAB | Facility: CLINIC | Age: 52
End: 2022-02-08
Payer: COMMERCIAL

## 2022-02-08 DIAGNOSIS — R78.81 BACTEREMIA: ICD-10-CM

## 2022-02-08 LAB
AST SERPL W P-5'-P-CCNC: 14 U/L (ref 0–45)
BASOPHILS # BLD AUTO: 0 10E3/UL (ref 0–0.2)
BASOPHILS NFR BLD AUTO: 1 %
BUN SERPL-MCNC: 11 MG/DL (ref 7–30)
CREAT SERPL-MCNC: 0.63 MG/DL (ref 0.66–1.25)
CRP SERPL-MCNC: <2.9 MG/L (ref 0–8)
EOSINOPHIL # BLD AUTO: 0.1 10E3/UL (ref 0–0.7)
EOSINOPHIL NFR BLD AUTO: 2 %
ERYTHROCYTE [DISTWIDTH] IN BLOOD BY AUTOMATED COUNT: 12.9 % (ref 10–15)
GFR SERPL CREATININE-BSD FRML MDRD: >90 ML/MIN/1.73M2
HCT VFR BLD AUTO: 40.8 % (ref 40–53)
HGB BLD-MCNC: 13.2 G/DL (ref 13.3–17.7)
IMM GRANULOCYTES # BLD: 0 10E3/UL
IMM GRANULOCYTES NFR BLD: 0 %
LYMPHOCYTES # BLD AUTO: 1.6 10E3/UL (ref 0.8–5.3)
LYMPHOCYTES NFR BLD AUTO: 33 %
MCH RBC QN AUTO: 29.1 PG (ref 26.5–33)
MCHC RBC AUTO-ENTMCNC: 32.4 G/DL (ref 31.5–36.5)
MCV RBC AUTO: 90 FL (ref 78–100)
MONOCYTES # BLD AUTO: 0.5 10E3/UL (ref 0–1.3)
MONOCYTES NFR BLD AUTO: 10 %
NEUTROPHILS # BLD AUTO: 2.6 10E3/UL (ref 1.6–8.3)
NEUTROPHILS NFR BLD AUTO: 54 %
NRBC # BLD AUTO: 0 10E3/UL
NRBC BLD AUTO-RTO: 0 /100
PLATELET # BLD AUTO: 266 10E3/UL (ref 150–450)
RBC # BLD AUTO: 4.53 10E6/UL (ref 4.4–5.9)
WBC # BLD AUTO: 4.9 10E3/UL (ref 4–11)

## 2022-02-08 PROCEDURE — 86140 C-REACTIVE PROTEIN: CPT | Performed by: SPECIALIST

## 2022-02-08 PROCEDURE — 84520 ASSAY OF UREA NITROGEN: CPT | Performed by: SPECIALIST

## 2022-02-08 PROCEDURE — 82565 ASSAY OF CREATININE: CPT | Performed by: SPECIALIST

## 2022-02-08 PROCEDURE — 85025 COMPLETE CBC W/AUTO DIFF WBC: CPT | Performed by: SPECIALIST

## 2022-02-08 PROCEDURE — 84450 TRANSFERASE (AST) (SGOT): CPT | Performed by: SPECIALIST

## 2022-02-15 NOTE — PROGRESS NOTES
This is a recent snapshot of the patient's [facility] medical record.  For current drug dose and complete information and questions, call [phone] or In Basket pool, [facility] ([id number])  CSN Number:  [id number]

## 2022-04-03 ENCOUNTER — HEALTH MAINTENANCE LETTER (OUTPATIENT)
Age: 52
End: 2022-04-03

## 2022-04-26 NOTE — PROGRESS NOTES
This is a recent snapshot of the patient's [facility] medical record.  For current drug dose and complete information and questions, call [phone] or In Basket pool, [facility] infusion ([id number])  CSN Number:  [id number]

## 2022-04-28 NOTE — PROGRESS NOTES
This is a recent snapshot of the patient's [facility] medical record.  For current drug dose and complete information and questions, call [phone] or In Basket pool, [facility] home infusion ([id number])  CSN Number:  [id number]

## 2022-04-29 NOTE — PROGRESS NOTES
This is a recent snapshot of the patient's [facility] medical record.  For current drug dose and complete information and questions, call [phone] or In Basket pool, [facility] ([id number])  CSN Number:  [id number]

## 2022-06-21 NOTE — PROGRESS NOTES
This is a recent snapshot of the patient's [facility] medical record.  For current drug dose and complete information and questions, call [phone] or In Basket pool, [facility] infusion ([id number])  CSN Number:  [id number]

## 2022-06-28 NOTE — PROGRESS NOTES
This is a recent snapshot of the patient's [facility] medical record.  For current drug dose and complete information and questions, call [phone] or In Basket pool, [facility] home infusion ([id number])  CSN Number:  [id number]

## 2022-07-13 NOTE — PROGRESS NOTES
This is a recent snapshot of the patient's [facility] medical record.  For current drug dose and complete information and questions, call [phone] or In Basket pool, [facility] home infusion ([id number])  CSN Number:  [id number]

## 2022-07-22 NOTE — PROGRESS NOTES
This is a recent snapshot of the patient's [facility] medical record.  For current drug dose and complete information and questions, call [phone] or In Basket pool, [facility] home infusion ([id number])  CSN Number:  [id number]

## 2022-07-25 NOTE — PROGRESS NOTES
This is a recent snapshot of the patient's [facility] medical record.  For current drug dose and complete information and questions, call [phone] or In Basket pool, [facility] home infusion ([id number])  CSN Number:  [id number]

## 2022-09-12 ENCOUNTER — LAB REQUISITION (OUTPATIENT)
Dept: LAB | Facility: CLINIC | Age: 52
End: 2022-09-12
Payer: COMMERCIAL

## 2022-09-12 ENCOUNTER — LAB (OUTPATIENT)
Dept: LAB | Facility: CLINIC | Age: 52
End: 2022-09-12
Payer: COMMERCIAL

## 2022-09-12 DIAGNOSIS — Z47.1 AFTERCARE FOLLOWING JOINT REPLACEMENT: ICD-10-CM

## 2022-09-12 DIAGNOSIS — Z47.1 AFTERCARE FOLLOWING JOINT REPLACEMENT SURGERY: ICD-10-CM

## 2022-09-12 DIAGNOSIS — M25.559 PAIN IN UNSPECIFIED HIP: ICD-10-CM

## 2022-09-12 LAB
CRP SERPL-MCNC: 13.69 MG/L
ERYTHROCYTE [SEDIMENTATION RATE] IN BLOOD BY WESTERGREN METHOD: 72 MM/HR (ref 0–20)
GRAM STAIN RESULT: ABNORMAL
GRAM STAIN RESULT: ABNORMAL

## 2022-09-12 PROCEDURE — 87077 CULTURE AEROBIC IDENTIFY: CPT | Mod: ORL | Performed by: ORTHOPAEDIC SURGERY

## 2022-09-12 PROCEDURE — 87205 SMEAR GRAM STAIN: CPT | Mod: ORL | Performed by: ORTHOPAEDIC SURGERY

## 2022-09-12 PROCEDURE — 87075 CULTR BACTERIA EXCEPT BLOOD: CPT | Mod: ORL | Performed by: ORTHOPAEDIC SURGERY

## 2022-09-12 PROCEDURE — 86140 C-REACTIVE PROTEIN: CPT

## 2022-09-12 PROCEDURE — 36415 COLL VENOUS BLD VENIPUNCTURE: CPT

## 2022-09-12 PROCEDURE — 85652 RBC SED RATE AUTOMATED: CPT

## 2022-09-15 LAB — BACTERIA SNV CULT: ABNORMAL

## 2022-09-19 ENCOUNTER — ANESTHESIA (OUTPATIENT)
Dept: SURGERY | Facility: CLINIC | Age: 52
End: 2022-09-19
Payer: COMMERCIAL

## 2022-09-19 ENCOUNTER — APPOINTMENT (OUTPATIENT)
Dept: GENERAL RADIOLOGY | Facility: CLINIC | Age: 52
End: 2022-09-19
Attending: ORTHOPAEDIC SURGERY
Payer: COMMERCIAL

## 2022-09-19 ENCOUNTER — ANESTHESIA EVENT (OUTPATIENT)
Dept: SURGERY | Facility: CLINIC | Age: 52
End: 2022-09-19
Payer: COMMERCIAL

## 2022-09-19 ENCOUNTER — HOSPITAL ENCOUNTER (INPATIENT)
Facility: CLINIC | Age: 52
LOS: 3 days | Discharge: HOME | End: 2022-09-22
Attending: ORTHOPAEDIC SURGERY | Admitting: ORTHOPAEDIC SURGERY
Payer: COMMERCIAL

## 2022-09-19 DIAGNOSIS — R78.81 BACTEREMIA: ICD-10-CM

## 2022-09-19 DIAGNOSIS — Z96.641 HISTORY OF REVISION OF TOTAL REPLACEMENT OF RIGHT HIP JOINT: Primary | ICD-10-CM

## 2022-09-19 DIAGNOSIS — T84.51XA INFECTION ASSOCIATED WITH INTERNAL RIGHT HIP PROSTHESIS, INITIAL ENCOUNTER (H): ICD-10-CM

## 2022-09-19 LAB
ABO/RH(D): NORMAL
ANTIBODY SCREEN: NEGATIVE
CREAT SERPL-MCNC: 0.64 MG/DL (ref 0.66–1.25)
GFR SERPL CREATININE-BSD FRML MDRD: >90 ML/MIN/1.73M2
HGB BLD-MCNC: 10.8 G/DL (ref 13.3–17.7)
SPECIMEN EXPIRATION DATE: NORMAL

## 2022-09-19 PROCEDURE — 258N000003 HC RX IP 258 OP 636: Performed by: ANESTHESIOLOGY

## 2022-09-19 PROCEDURE — 250N000011 HC RX IP 250 OP 636: Performed by: NURSE ANESTHETIST, CERTIFIED REGISTERED

## 2022-09-19 PROCEDURE — 250N000009 HC RX 250: Performed by: STUDENT IN AN ORGANIZED HEALTH CARE EDUCATION/TRAINING PROGRAM

## 2022-09-19 PROCEDURE — 360N000078 HC SURGERY LEVEL 5, PER MIN: Performed by: ORTHOPAEDIC SURGERY

## 2022-09-19 PROCEDURE — 86901 BLOOD TYPING SEROLOGIC RH(D): CPT | Performed by: NURSE ANESTHETIST, CERTIFIED REGISTERED

## 2022-09-19 PROCEDURE — 250N000009 HC RX 250: Performed by: ORTHOPAEDIC SURGERY

## 2022-09-19 PROCEDURE — 250N000013 HC RX MED GY IP 250 OP 250 PS 637: Performed by: ORTHOPAEDIC SURGERY

## 2022-09-19 PROCEDURE — 258N000003 HC RX IP 258 OP 636: Performed by: ORTHOPAEDIC SURGERY

## 2022-09-19 PROCEDURE — 258N000003 HC RX IP 258 OP 636: Performed by: NURSE ANESTHETIST, CERTIFIED REGISTERED

## 2022-09-19 PROCEDURE — 999N000141 HC STATISTIC PRE-PROCEDURE NURSING ASSESSMENT: Performed by: ORTHOPAEDIC SURGERY

## 2022-09-19 PROCEDURE — 99207 PR NO CHARGE LOS: CPT | Performed by: PHYSICIAN ASSISTANT

## 2022-09-19 PROCEDURE — 36415 COLL VENOUS BLD VENIPUNCTURE: CPT | Performed by: NURSE ANESTHETIST, CERTIFIED REGISTERED

## 2022-09-19 PROCEDURE — 250N000013 HC RX MED GY IP 250 OP 250 PS 637: Performed by: PHYSICIAN ASSISTANT

## 2022-09-19 PROCEDURE — 999N000063 XR PELVIS AND HIP PORTABLE RIGHT 1 VIEW

## 2022-09-19 PROCEDURE — C1713 ANCHOR/SCREW BN/BN,TIS/BN: HCPCS | Performed by: ORTHOPAEDIC SURGERY

## 2022-09-19 PROCEDURE — 250N000009 HC RX 250: Performed by: NURSE ANESTHETIST, CERTIFIED REGISTERED

## 2022-09-19 PROCEDURE — C1776 JOINT DEVICE (IMPLANTABLE): HCPCS | Performed by: ORTHOPAEDIC SURGERY

## 2022-09-19 PROCEDURE — 250N000011 HC RX IP 250 OP 636: Performed by: STUDENT IN AN ORGANIZED HEALTH CARE EDUCATION/TRAINING PROGRAM

## 2022-09-19 PROCEDURE — 250N000011 HC RX IP 250 OP 636: Performed by: ORTHOPAEDIC SURGERY

## 2022-09-19 PROCEDURE — 710N000009 HC RECOVERY PHASE 1, LEVEL 1, PER MIN: Performed by: ORTHOPAEDIC SURGERY

## 2022-09-19 PROCEDURE — 272N000001 HC OR GENERAL SUPPLY STERILE: Performed by: ORTHOPAEDIC SURGERY

## 2022-09-19 PROCEDURE — 120N000001 HC R&B MED SURG/OB

## 2022-09-19 PROCEDURE — 258N000001 HC RX 258: Performed by: ORTHOPAEDIC SURGERY

## 2022-09-19 PROCEDURE — 85018 HEMOGLOBIN: CPT | Performed by: ANESTHESIOLOGY

## 2022-09-19 PROCEDURE — 82565 ASSAY OF CREATININE: CPT | Performed by: STUDENT IN AN ORGANIZED HEALTH CARE EDUCATION/TRAINING PROGRAM

## 2022-09-19 PROCEDURE — 250N000013 HC RX MED GY IP 250 OP 250 PS 637: Performed by: STUDENT IN AN ORGANIZED HEALTH CARE EDUCATION/TRAINING PROGRAM

## 2022-09-19 PROCEDURE — 36415 COLL VENOUS BLD VENIPUNCTURE: CPT | Performed by: ANESTHESIOLOGY

## 2022-09-19 PROCEDURE — 250N000025 HC SEVOFLURANE, PER MIN: Performed by: ORTHOPAEDIC SURGERY

## 2022-09-19 PROCEDURE — 272N000002 HC OR SUPPLY OTHER OPNP: Performed by: ORTHOPAEDIC SURGERY

## 2022-09-19 PROCEDURE — 370N000017 HC ANESTHESIA TECHNICAL FEE, PER MIN: Performed by: ORTHOPAEDIC SURGERY

## 2022-09-19 PROCEDURE — P9045 ALBUMIN (HUMAN), 5%, 250 ML: HCPCS | Performed by: NURSE ANESTHETIST, CERTIFIED REGISTERED

## 2022-09-19 DEVICE — IMPLANTABLE DEVICE
Type: IMPLANTABLE DEVICE | Site: HIP | Status: NON-FUNCTIONAL
Removed: 2022-11-30

## 2022-09-19 DEVICE — BONE CEMENT SIMPLEX FULL DOSE 6191-1-001: Type: IMPLANTABLE DEVICE | Site: HIP | Status: FUNCTIONAL

## 2022-09-19 DEVICE — IMP CUP ACET DEPUY SPACER PROSTALAC 32X42MM 1541-42-320
Type: IMPLANTABLE DEVICE | Site: HIP | Status: NON-FUNCTIONAL
Removed: 2022-11-30

## 2022-09-19 DEVICE — BONE CEMENT SIMPLEX 1/2 DOSE 6188-1-001: Type: IMPLANTABLE DEVICE | Site: HIP | Status: FUNCTIONAL

## 2022-09-19 RX ORDER — KETAMINE HYDROCHLORIDE 10 MG/ML
INJECTION INTRAMUSCULAR; INTRAVENOUS PRN
Status: DISCONTINUED | OUTPATIENT
Start: 2022-09-19 | End: 2022-09-19

## 2022-09-19 RX ORDER — LIDOCAINE 40 MG/G
CREAM TOPICAL
Status: DISCONTINUED | OUTPATIENT
Start: 2022-09-19 | End: 2022-09-22 | Stop reason: HOSPADM

## 2022-09-19 RX ORDER — HYDROXYZINE HYDROCHLORIDE 25 MG/1
25 TABLET, FILM COATED ORAL EVERY 6 HOURS PRN
Status: DISCONTINUED | OUTPATIENT
Start: 2022-09-19 | End: 2022-09-22 | Stop reason: HOSPADM

## 2022-09-19 RX ORDER — FENTANYL CITRATE 50 UG/ML
INJECTION, SOLUTION INTRAMUSCULAR; INTRAVENOUS PRN
Status: DISCONTINUED | OUTPATIENT
Start: 2022-09-19 | End: 2022-09-19

## 2022-09-19 RX ORDER — AMOXICILLIN 250 MG
1 CAPSULE ORAL 2 TIMES DAILY
Status: DISCONTINUED | OUTPATIENT
Start: 2022-09-19 | End: 2022-09-22 | Stop reason: HOSPADM

## 2022-09-19 RX ORDER — LABETALOL HYDROCHLORIDE 5 MG/ML
10 INJECTION, SOLUTION INTRAVENOUS
Status: DISCONTINUED | OUTPATIENT
Start: 2022-09-19 | End: 2022-09-19 | Stop reason: HOSPADM

## 2022-09-19 RX ORDER — TOBRAMYCIN 1.2 G/30ML
INJECTION, POWDER, LYOPHILIZED, FOR SOLUTION INTRAVENOUS PRN
Status: DISCONTINUED | OUTPATIENT
Start: 2022-09-19 | End: 2022-09-19 | Stop reason: HOSPADM

## 2022-09-19 RX ORDER — HYDROXYZINE HYDROCHLORIDE 25 MG/1
25 TABLET, FILM COATED ORAL EVERY 6 HOURS PRN
Status: DISCONTINUED | OUTPATIENT
Start: 2022-09-19 | End: 2022-09-19 | Stop reason: HOSPADM

## 2022-09-19 RX ORDER — CELECOXIB 200 MG/1
400 CAPSULE ORAL ONCE
Status: COMPLETED | OUTPATIENT
Start: 2022-09-19 | End: 2022-09-19

## 2022-09-19 RX ORDER — CEFAZOLIN SODIUM 2 G/100ML
2 INJECTION, SOLUTION INTRAVENOUS EVERY 8 HOURS
Status: COMPLETED | OUTPATIENT
Start: 2022-09-20 | End: 2022-09-20

## 2022-09-19 RX ORDER — HYDROMORPHONE HCL IN WATER/PF 6 MG/30 ML
0.2 PATIENT CONTROLLED ANALGESIA SYRINGE INTRAVENOUS EVERY 5 MIN PRN
Status: DISCONTINUED | OUTPATIENT
Start: 2022-09-19 | End: 2022-09-19 | Stop reason: HOSPADM

## 2022-09-19 RX ORDER — NALOXONE HYDROCHLORIDE 0.4 MG/ML
0.4 INJECTION, SOLUTION INTRAMUSCULAR; INTRAVENOUS; SUBCUTANEOUS
Status: DISCONTINUED | OUTPATIENT
Start: 2022-09-19 | End: 2022-09-22 | Stop reason: HOSPADM

## 2022-09-19 RX ORDER — ATORVASTATIN CALCIUM 40 MG/1
40 TABLET, FILM COATED ORAL DAILY
Status: DISCONTINUED | OUTPATIENT
Start: 2022-09-19 | End: 2022-09-22 | Stop reason: HOSPADM

## 2022-09-19 RX ORDER — MINERAL OIL
OIL (ML) MISCELLANEOUS PRN
Status: DISCONTINUED | OUTPATIENT
Start: 2022-09-19 | End: 2022-09-19 | Stop reason: HOSPADM

## 2022-09-19 RX ORDER — ACETAMINOPHEN 325 MG/1
975 TABLET ORAL ONCE
Status: DISCONTINUED | OUTPATIENT
Start: 2022-09-19 | End: 2022-09-19 | Stop reason: HOSPADM

## 2022-09-19 RX ORDER — LIDOCAINE HYDROCHLORIDE 20 MG/ML
INJECTION, SOLUTION INFILTRATION; PERINEURAL PRN
Status: DISCONTINUED | OUTPATIENT
Start: 2022-09-19 | End: 2022-09-19

## 2022-09-19 RX ORDER — VANCOMYCIN HYDROCHLORIDE 1 G/20ML
INJECTION, POWDER, LYOPHILIZED, FOR SOLUTION INTRAVENOUS PRN
Status: DISCONTINUED | OUTPATIENT
Start: 2022-09-19 | End: 2022-09-19 | Stop reason: HOSPADM

## 2022-09-19 RX ORDER — ACETAMINOPHEN 325 MG/1
650 TABLET ORAL EVERY 4 HOURS PRN
Status: DISCONTINUED | OUTPATIENT
Start: 2022-09-22 | End: 2022-09-22 | Stop reason: HOSPADM

## 2022-09-19 RX ORDER — OXYCODONE HYDROCHLORIDE 5 MG/1
10 TABLET ORAL EVERY 4 HOURS PRN
Status: DISCONTINUED | OUTPATIENT
Start: 2022-09-19 | End: 2022-09-22 | Stop reason: HOSPADM

## 2022-09-19 RX ORDER — HYDROMORPHONE HYDROCHLORIDE 1 MG/ML
INJECTION, SOLUTION INTRAMUSCULAR; INTRAVENOUS; SUBCUTANEOUS PRN
Status: DISCONTINUED | OUTPATIENT
Start: 2022-09-19 | End: 2022-09-19

## 2022-09-19 RX ORDER — FENTANYL CITRATE 50 UG/ML
50 INJECTION, SOLUTION INTRAMUSCULAR; INTRAVENOUS EVERY 5 MIN PRN
Status: DISCONTINUED | OUTPATIENT
Start: 2022-09-19 | End: 2022-09-19 | Stop reason: HOSPADM

## 2022-09-19 RX ORDER — TRANEXAMIC ACID 650 MG/1
1950 TABLET ORAL ONCE
Status: COMPLETED | OUTPATIENT
Start: 2022-09-19 | End: 2022-09-19

## 2022-09-19 RX ORDER — DIMENHYDRINATE 50 MG/ML
25 INJECTION, SOLUTION INTRAMUSCULAR; INTRAVENOUS
Status: DISCONTINUED | OUTPATIENT
Start: 2022-09-19 | End: 2022-09-19 | Stop reason: HOSPADM

## 2022-09-19 RX ORDER — HYDROMORPHONE HCL IN WATER/PF 6 MG/30 ML
0.4 PATIENT CONTROLLED ANALGESIA SYRINGE INTRAVENOUS
Status: DISCONTINUED | OUTPATIENT
Start: 2022-09-19 | End: 2022-09-22 | Stop reason: HOSPADM

## 2022-09-19 RX ORDER — ONDANSETRON 2 MG/ML
4 INJECTION INTRAMUSCULAR; INTRAVENOUS EVERY 6 HOURS PRN
Status: DISCONTINUED | OUTPATIENT
Start: 2022-09-19 | End: 2022-09-22 | Stop reason: HOSPADM

## 2022-09-19 RX ORDER — NALOXONE HYDROCHLORIDE 0.4 MG/ML
0.2 INJECTION, SOLUTION INTRAMUSCULAR; INTRAVENOUS; SUBCUTANEOUS
Status: DISCONTINUED | OUTPATIENT
Start: 2022-09-19 | End: 2022-09-22 | Stop reason: HOSPADM

## 2022-09-19 RX ORDER — BISACODYL 10 MG
10 SUPPOSITORY, RECTAL RECTAL DAILY PRN
Status: DISCONTINUED | OUTPATIENT
Start: 2022-09-19 | End: 2022-09-22 | Stop reason: HOSPADM

## 2022-09-19 RX ORDER — PROCHLORPERAZINE MALEATE 10 MG
10 TABLET ORAL EVERY 6 HOURS PRN
Status: DISCONTINUED | OUTPATIENT
Start: 2022-09-19 | End: 2022-09-22 | Stop reason: HOSPADM

## 2022-09-19 RX ORDER — CELECOXIB 200 MG/1
200 CAPSULE ORAL DAILY
Qty: 42 CAPSULE | Refills: 0 | Status: SHIPPED | OUTPATIENT
Start: 2022-09-19 | End: 2022-09-21

## 2022-09-19 RX ORDER — CEFAZOLIN SODIUM/WATER 3 G/30 ML
3 SYRINGE (ML) INTRAVENOUS SEE ADMIN INSTRUCTIONS
Status: DISCONTINUED | OUTPATIENT
Start: 2022-09-19 | End: 2022-09-19 | Stop reason: HOSPADM

## 2022-09-19 RX ORDER — CALCIUM CARBONATE 500 MG/1
500 TABLET, CHEWABLE ORAL 4 TIMES DAILY PRN
Status: DISCONTINUED | OUTPATIENT
Start: 2022-09-19 | End: 2022-09-22 | Stop reason: HOSPADM

## 2022-09-19 RX ORDER — DIPHENHYDRAMINE HCL 25 MG
25 CAPSULE ORAL EVERY 6 HOURS PRN
Status: DISCONTINUED | OUTPATIENT
Start: 2022-09-19 | End: 2022-09-22 | Stop reason: HOSPADM

## 2022-09-19 RX ORDER — PREGABALIN 150 MG/1
150 CAPSULE ORAL ONCE
Status: COMPLETED | OUTPATIENT
Start: 2022-09-19 | End: 2022-09-19

## 2022-09-19 RX ORDER — ACETAMINOPHEN 325 MG/1
975 TABLET ORAL ONCE
Status: COMPLETED | OUTPATIENT
Start: 2022-09-19 | End: 2022-09-19

## 2022-09-19 RX ORDER — AMOXICILLIN 250 MG
1-2 CAPSULE ORAL 2 TIMES DAILY
Qty: 30 TABLET | Refills: 0 | Status: SHIPPED | OUTPATIENT
Start: 2022-09-19 | End: 2022-09-21

## 2022-09-19 RX ORDER — SODIUM CHLORIDE, SODIUM LACTATE, POTASSIUM CHLORIDE, CALCIUM CHLORIDE 600; 310; 30; 20 MG/100ML; MG/100ML; MG/100ML; MG/100ML
INJECTION, SOLUTION INTRAVENOUS CONTINUOUS
Status: ACTIVE | OUTPATIENT
Start: 2022-09-19 | End: 2022-09-20

## 2022-09-19 RX ORDER — MAGNESIUM HYDROXIDE 1200 MG/15ML
LIQUID ORAL PRN
Status: DISCONTINUED | OUTPATIENT
Start: 2022-09-19 | End: 2022-09-19 | Stop reason: HOSPADM

## 2022-09-19 RX ORDER — METHOCARBAMOL 750 MG/1
750 TABLET, FILM COATED ORAL EVERY 6 HOURS PRN
Status: DISCONTINUED | OUTPATIENT
Start: 2022-09-19 | End: 2022-09-22 | Stop reason: HOSPADM

## 2022-09-19 RX ORDER — ONDANSETRON 2 MG/ML
4 INJECTION INTRAMUSCULAR; INTRAVENOUS EVERY 30 MIN PRN
Status: DISCONTINUED | OUTPATIENT
Start: 2022-09-19 | End: 2022-09-19 | Stop reason: HOSPADM

## 2022-09-19 RX ORDER — SODIUM CHLORIDE, SODIUM LACTATE, POTASSIUM CHLORIDE, CALCIUM CHLORIDE 600; 310; 30; 20 MG/100ML; MG/100ML; MG/100ML; MG/100ML
INJECTION, SOLUTION INTRAVENOUS CONTINUOUS
Status: DISCONTINUED | OUTPATIENT
Start: 2022-09-19 | End: 2022-09-19 | Stop reason: HOSPADM

## 2022-09-19 RX ORDER — OXYCODONE HYDROCHLORIDE 5 MG/1
5 TABLET ORAL EVERY 4 HOURS PRN
Status: DISCONTINUED | OUTPATIENT
Start: 2022-09-19 | End: 2022-09-22 | Stop reason: HOSPADM

## 2022-09-19 RX ORDER — KETOROLAC TROMETHAMINE 15 MG/ML
15 INJECTION, SOLUTION INTRAMUSCULAR; INTRAVENOUS EVERY 6 HOURS
Status: COMPLETED | OUTPATIENT
Start: 2022-09-20 | End: 2022-09-21

## 2022-09-19 RX ORDER — CEFAZOLIN SODIUM/WATER 3 G/30 ML
3 SYRINGE (ML) INTRAVENOUS
Status: COMPLETED | OUTPATIENT
Start: 2022-09-19 | End: 2022-09-19

## 2022-09-19 RX ORDER — PROPOFOL 10 MG/ML
INJECTION, EMULSION INTRAVENOUS PRN
Status: DISCONTINUED | OUTPATIENT
Start: 2022-09-19 | End: 2022-09-19

## 2022-09-19 RX ORDER — HYDROMORPHONE HCL IN WATER/PF 6 MG/30 ML
0.2 PATIENT CONTROLLED ANALGESIA SYRINGE INTRAVENOUS
Status: DISCONTINUED | OUTPATIENT
Start: 2022-09-19 | End: 2022-09-22 | Stop reason: HOSPADM

## 2022-09-19 RX ORDER — OXYCODONE HYDROCHLORIDE 5 MG/1
5 TABLET ORAL EVERY 4 HOURS PRN
Status: DISCONTINUED | OUTPATIENT
Start: 2022-09-19 | End: 2022-09-19 | Stop reason: HOSPADM

## 2022-09-19 RX ORDER — ONDANSETRON 4 MG/1
4 TABLET, ORALLY DISINTEGRATING ORAL EVERY 6 HOURS PRN
Status: DISCONTINUED | OUTPATIENT
Start: 2022-09-19 | End: 2022-09-22 | Stop reason: HOSPADM

## 2022-09-19 RX ORDER — ONDANSETRON 4 MG/1
4 TABLET, ORALLY DISINTEGRATING ORAL EVERY 30 MIN PRN
Status: DISCONTINUED | OUTPATIENT
Start: 2022-09-19 | End: 2022-09-19 | Stop reason: HOSPADM

## 2022-09-19 RX ORDER — ACETAMINOPHEN 325 MG/1
975 TABLET ORAL EVERY 6 HOURS PRN
Qty: 100 TABLET | Refills: 0 | Status: SHIPPED | OUTPATIENT
Start: 2022-09-19 | End: 2022-09-21

## 2022-09-19 RX ORDER — POLYETHYLENE GLYCOL 3350 17 G/17G
17 POWDER, FOR SOLUTION ORAL DAILY
Status: DISCONTINUED | OUTPATIENT
Start: 2022-09-20 | End: 2022-09-22 | Stop reason: HOSPADM

## 2022-09-19 RX ORDER — HYDRALAZINE HYDROCHLORIDE 20 MG/ML
2.5-5 INJECTION INTRAMUSCULAR; INTRAVENOUS EVERY 10 MIN PRN
Status: DISCONTINUED | OUTPATIENT
Start: 2022-09-19 | End: 2022-09-19 | Stop reason: HOSPADM

## 2022-09-19 RX ORDER — ACETAMINOPHEN 325 MG/1
975 TABLET ORAL EVERY 8 HOURS
Status: COMPLETED | OUTPATIENT
Start: 2022-09-19 | End: 2022-09-22

## 2022-09-19 RX ORDER — ONDANSETRON 2 MG/ML
INJECTION INTRAMUSCULAR; INTRAVENOUS PRN
Status: DISCONTINUED | OUTPATIENT
Start: 2022-09-19 | End: 2022-09-19

## 2022-09-19 RX ADMIN — ROCURONIUM BROMIDE 10 MG: 50 INJECTION, SOLUTION INTRAVENOUS at 16:00

## 2022-09-19 RX ADMIN — SENNOSIDES AND DOCUSATE SODIUM 1 TABLET: 50; 8.6 TABLET ORAL at 21:05

## 2022-09-19 RX ADMIN — PHENYLEPHRINE HYDROCHLORIDE 0.5 MCG/KG/MIN: 10 INJECTION INTRAVENOUS at 13:14

## 2022-09-19 RX ADMIN — MIDAZOLAM 2 MG: 1 INJECTION INTRAMUSCULAR; INTRAVENOUS at 12:12

## 2022-09-19 RX ADMIN — ACETAMINOPHEN 975 MG: 325 TABLET, FILM COATED ORAL at 10:31

## 2022-09-19 RX ADMIN — Medication 3 G: at 16:13

## 2022-09-19 RX ADMIN — PHENYLEPHRINE HYDROCHLORIDE 100 MCG: 10 INJECTION INTRAVENOUS at 12:43

## 2022-09-19 RX ADMIN — ROCURONIUM BROMIDE 10 MG: 50 INJECTION, SOLUTION INTRAVENOUS at 15:01

## 2022-09-19 RX ADMIN — PHENYLEPHRINE HYDROCHLORIDE 100 MCG: 10 INJECTION INTRAVENOUS at 12:31

## 2022-09-19 RX ADMIN — OXYCODONE HYDROCHLORIDE 5 MG: 5 TABLET ORAL at 23:18

## 2022-09-19 RX ADMIN — Medication 3 G: at 12:16

## 2022-09-19 RX ADMIN — SUGAMMADEX 200 MG: 100 INJECTION, SOLUTION INTRAVENOUS at 17:45

## 2022-09-19 RX ADMIN — ROCURONIUM BROMIDE 10 MG: 50 INJECTION, SOLUTION INTRAVENOUS at 16:18

## 2022-09-19 RX ADMIN — ACETAMINOPHEN 975 MG: 325 TABLET, FILM COATED ORAL at 21:05

## 2022-09-19 RX ADMIN — LIDOCAINE HYDROCHLORIDE 60 MG: 20 INJECTION, SOLUTION INFILTRATION; PERINEURAL at 12:15

## 2022-09-19 RX ADMIN — FENTANYL CITRATE 50 MCG: 50 INJECTION, SOLUTION INTRAMUSCULAR; INTRAVENOUS at 17:07

## 2022-09-19 RX ADMIN — Medication 20 MG: at 17:20

## 2022-09-19 RX ADMIN — CEFAZOLIN SODIUM 2 G: 2 INJECTION, SOLUTION INTRAVENOUS at 23:17

## 2022-09-19 RX ADMIN — PREGABALIN 150 MG: 150 CAPSULE ORAL at 10:32

## 2022-09-19 RX ADMIN — FENTANYL CITRATE 50 MCG: 50 INJECTION, SOLUTION INTRAMUSCULAR; INTRAVENOUS at 17:16

## 2022-09-19 RX ADMIN — ROCURONIUM BROMIDE 10 MG: 50 INJECTION, SOLUTION INTRAVENOUS at 15:29

## 2022-09-19 RX ADMIN — FENTANYL CITRATE 50 MCG: 50 INJECTION, SOLUTION INTRAMUSCULAR; INTRAVENOUS at 12:15

## 2022-09-19 RX ADMIN — ROCURONIUM BROMIDE 20 MG: 50 INJECTION, SOLUTION INTRAVENOUS at 14:30

## 2022-09-19 RX ADMIN — SODIUM CHLORIDE, POTASSIUM CHLORIDE, SODIUM LACTATE AND CALCIUM CHLORIDE: 600; 310; 30; 20 INJECTION, SOLUTION INTRAVENOUS at 21:08

## 2022-09-19 RX ADMIN — FENTANYL CITRATE 50 MCG: 50 INJECTION, SOLUTION INTRAMUSCULAR; INTRAVENOUS at 12:47

## 2022-09-19 RX ADMIN — ROCURONIUM BROMIDE 10 MG: 50 INJECTION, SOLUTION INTRAVENOUS at 13:29

## 2022-09-19 RX ADMIN — ONDANSETRON 4 MG: 2 INJECTION INTRAMUSCULAR; INTRAVENOUS at 17:12

## 2022-09-19 RX ADMIN — ROCURONIUM BROMIDE 20 MG: 50 INJECTION, SOLUTION INTRAVENOUS at 13:06

## 2022-09-19 RX ADMIN — TRANEXAMIC ACID 1950 MG: 650 TABLET ORAL at 10:32

## 2022-09-19 RX ADMIN — CELECOXIB 400 MG: 200 CAPSULE ORAL at 10:32

## 2022-09-19 RX ADMIN — PHENYLEPHRINE HYDROCHLORIDE 100 MCG: 10 INJECTION INTRAVENOUS at 12:36

## 2022-09-19 RX ADMIN — SODIUM CHLORIDE, POTASSIUM CHLORIDE, SODIUM LACTATE AND CALCIUM CHLORIDE: 600; 310; 30; 20 INJECTION, SOLUTION INTRAVENOUS at 16:08

## 2022-09-19 RX ADMIN — HYDROMORPHONE HYDROCHLORIDE 0.5 MG: 1 INJECTION, SOLUTION INTRAMUSCULAR; INTRAVENOUS; SUBCUTANEOUS at 13:48

## 2022-09-19 RX ADMIN — HYDROMORPHONE HYDROCHLORIDE 0.5 MG: 1 INJECTION, SOLUTION INTRAMUSCULAR; INTRAVENOUS; SUBCUTANEOUS at 16:52

## 2022-09-19 RX ADMIN — SODIUM CHLORIDE, POTASSIUM CHLORIDE, SODIUM LACTATE AND CALCIUM CHLORIDE: 600; 310; 30; 20 INJECTION, SOLUTION INTRAVENOUS at 10:32

## 2022-09-19 RX ADMIN — ROCURONIUM BROMIDE 50 MG: 50 INJECTION, SOLUTION INTRAVENOUS at 12:15

## 2022-09-19 RX ADMIN — ROCURONIUM BROMIDE 20 MG: 50 INJECTION, SOLUTION INTRAVENOUS at 12:36

## 2022-09-19 RX ADMIN — ALBUMIN HUMAN: 0.05 INJECTION, SOLUTION INTRAVENOUS at 14:29

## 2022-09-19 RX ADMIN — PROPOFOL 60 MG: 10 INJECTION, EMULSION INTRAVENOUS at 12:17

## 2022-09-19 RX ADMIN — PHENYLEPHRINE HYDROCHLORIDE 100 MCG: 10 INJECTION INTRAVENOUS at 13:51

## 2022-09-19 RX ADMIN — ATORVASTATIN CALCIUM 40 MG: 40 TABLET, FILM COATED ORAL at 23:17

## 2022-09-19 RX ADMIN — ROCURONIUM BROMIDE 20 MG: 50 INJECTION, SOLUTION INTRAVENOUS at 13:46

## 2022-09-19 RX ADMIN — SODIUM CHLORIDE, POTASSIUM CHLORIDE, SODIUM LACTATE AND CALCIUM CHLORIDE: 600; 310; 30; 20 INJECTION, SOLUTION INTRAVENOUS at 13:18

## 2022-09-19 RX ADMIN — ALBUMIN HUMAN: 0.05 INJECTION, SOLUTION INTRAVENOUS at 14:00

## 2022-09-19 RX ADMIN — PROPOFOL 200 MG: 10 INJECTION, EMULSION INTRAVENOUS at 12:15

## 2022-09-19 ASSESSMENT — ACTIVITIES OF DAILY LIVING (ADL)
ADLS_ACUITY_SCORE: 20
ADLS_ACUITY_SCORE: 21
ADLS_ACUITY_SCORE: 35
ADLS_ACUITY_SCORE: 20

## 2022-09-19 ASSESSMENT — LIFESTYLE VARIABLES: TOBACCO_USE: 0

## 2022-09-19 NOTE — BRIEF OP NOTE
[facility]    Brief Operative Note    Pre-operative diagnosis: Infection associated with internal right hip prosthesis, initial encounter (H) [T84.51XA]  Post-operative diagnosis same    Procedure: Procedure(s):  RIGHT HIP EXPLANT,  IRRIGATION AND DEBRIDEMENT, PLACEMENT OF ANTIBIOTIC SPACER  Surgeon: Surgeon(s) and Role:     * Eric Almaraz MD - Primary     * Medardo Ulloa - Assisting     * Deborah Tim MD - Assisting  Anesthesia: General   Estimated Blood Loss: 1000   Implants:   Implant Name Type Inv. Item Serial No.  Lot No. LRB No. Used Action   BONE CEMENT SIMPLEX FULL DOSE 6191-1-001 - [id number] Cement, Bone BONE CEMENT SIMPLEX FULL DOSE 6191-1-001  [employer] [id number] Right 4 Implanted   STEM FEMORAL PROSTALAC STD 105MM SZ 1 - [id number] Total Joint Component/Insert STEM FEMORAL PROSTALAC STD 105MM SZ 1  [employer]- [id number] Right 1 Implanted   IMP CUP ACET DEPUY SPACER PROSTALAC [id number] 1541- - [id number] Total Joint Component/Insert IMP CUP ACET DEPUY SPACER PROSTALAC [id number] 1541-  [employer]- [id number] Right 1 Implanted   BONE CEMENT SIMPLEX 1/2 DOSE 6188-1-001 - [id number] Cement, Bone BONE CEMENT SIMPLEX 1/2 DOSE 6188-1-001  [employer] [id number] Right 1 Implanted   IMP HEAD FEMORAL DEPUY PROSTALAC 32MM +13 BLK 1365- - [id number] Total Joint Component/Insert IMP HEAD FEMORAL DEPUY PROSTALAC 32MM +13 BLK 1365-  [employer]- [id number] Right 1 Implanted

## 2022-09-19 NOTE — INTERVAL H&P NOTE
"I have reviewed the surgical (or preoperative) H&P that is linked to this encounter, and examined the patient. There are no significant changes    Clinical Conditions Present on Arrival:  Clinically Significant Risk Factors Present on Admission                   # Obesity: Estimated body mass index is 33.15 kg/m  as calculated from the following:    Height as of this encounter: 1.93 m (6' 4\").    Weight as of this encounter: 123.5 kg (272 lb 4.8 oz).       "

## 2022-09-19 NOTE — PROGRESS NOTES
SPIRITUAL HEALTH SERVICES  [facility] Main PACU  PRE-SURGICAL VISIT    I visited with Petty Troncoso and his daughter Matheus.    I offered spiritual and emotional support and said a prayer.    SH remains available.    [facility]   Spiritual Health Phone Line [phone]  Spiritual Health Pager [phone]

## 2022-09-19 NOTE — OR NURSING
Intra-operatively, the Surgical Simplex bone cement full dose box no.4, the ampoule that was only used in the field as mixed with Cement no.1,2 and 3. Then box no. 5 which was 1/2 Dose, the ampoule was only used in the field. Boxes 4 and 5, the PACKET inside were not given to the field.

## 2022-09-19 NOTE — ANESTHESIA PROCEDURE NOTES
Airway       Patient location during procedure: OR       Procedure Start/Stop Times: 9/19/2022 12:18 PM  Staff -        Anesthesiologist:  Shannan Flores MD       CRNA: Rosalina Barrientos APRN CRNA       Performed By: CRNA  Consent for Airway        Urgency: elective  Indications and Patient Condition       Indications for airway management: christy-procedural         Mask difficulty assessment: 2 - vent by mask + OA or adjuvant +/- NMBA    Final Airway Details       Final airway type: endotracheal airway       Successful airway: ETT - single  Endotracheal Airway Details        ETT size (mm): 8.0       Cuffed: yes       Successful intubation technique: video laryngoscopy       VL Blade Size: Glidescope 4       Grade View of Cords: 1       Adjucts: stylet       Position: Right       Measured from: lips       Secured at (cm): 24       Bite block used: None    Post intubation assessment        Placement verified by: capnometry, equal breath sounds and chest rise        Number of attempts at approach: 1       Secured with: commercial tube cruz and pink tape       Ease of procedure: easy       Dentition: Intact and Unchanged    Medication(s) Administered   Medication Administration Time: 9/19/2022 12:18 PM

## 2022-09-19 NOTE — ANESTHESIA CARE TRANSFER NOTE
Patient: Aba Gan    Procedure: Procedure(s):  RIGHT HIP EXPLANT,  IRRIGATION AND DEBRIDEMENT, PLACEMENT OF ANTIBIOTIC SPACER       Diagnosis: Infection associated with internal right hip prosthesis, initial encounter (H) [T84.51XA]  Diagnosis Additional Information: No value filed.    Anesthesia Type:   General     Note:    Oropharynx: oropharynx clear of all foreign objects and spontaneously breathing  Level of Consciousness: drowsy  Oxygen Supplementation: face mask  Level of Supplemental Oxygen (L/min / FiO2): 6  Independent Airway: airway patency satisfactory and stable  Dentition: dentition unchanged  Vital Signs Stable: post-procedure vital signs reviewed and stable  Report to RN Given: handoff report given  Patient transferred to: PACU  Comments: Neuromuscular blockade reversed with sugammadex, spontaneous respirations, adequate tidal volumes, followed commands to voice, oropharynx suctioned with soft flexible catheter, extubated atraumatically, extubated with suction, airway patent after extubation.  Oxygen via facemask at 6 liters per minute to PACU. Oxygen tubing connected to wall O2 in PACU, SpO2, NiBP, and EKG monitors and alarms on and functioning, Servando Hugger warmer connected to patient gown, report on patient's clinical status given to PACU RN, RN questions answered.     Handoff Report: Identifed the Patient, Identified the Reponsible Provider, Reviewed the pertinent medical history, Discussed the surgical course, Reviewed Intra-OP anesthesia mangement and issues during anesthesia, Set expectations for post-procedure period and Allowed opportunity for questions and acknowledgement of understanding      Vitals:  Vitals Value Taken Time   BP     Temp     Pulse     Resp     SpO2         Electronically Signed By: DWIGHT Ellis CRNA  September 19, 2022  5:50 PM

## 2022-09-20 ENCOUNTER — HOME INFUSION (PRE-WILLOW HOME INFUSION) (OUTPATIENT)
Dept: PHARMACY | Facility: CLINIC | Age: 52
End: 2022-09-20

## 2022-09-20 ENCOUNTER — APPOINTMENT (OUTPATIENT)
Dept: OCCUPATIONAL THERAPY | Facility: CLINIC | Age: 52
End: 2022-09-20
Attending: ORTHOPAEDIC SURGERY
Payer: COMMERCIAL

## 2022-09-20 ENCOUNTER — APPOINTMENT (OUTPATIENT)
Dept: PHYSICAL THERAPY | Facility: CLINIC | Age: 52
End: 2022-09-20
Attending: ORTHOPAEDIC SURGERY
Payer: COMMERCIAL

## 2022-09-20 LAB
ANION GAP SERPL CALCULATED.3IONS-SCNC: 6 MMOL/L (ref 3–14)
BUN SERPL-MCNC: 12 MG/DL (ref 7–30)
CALCIUM SERPL-MCNC: 8.1 MG/DL (ref 8.5–10.1)
CHLORIDE BLD-SCNC: 105 MMOL/L (ref 94–109)
CO2 SERPL-SCNC: 25 MMOL/L (ref 20–32)
CREAT SERPL-MCNC: 0.71 MG/DL (ref 0.66–1.25)
GFR SERPL CREATININE-BSD FRML MDRD: >90 ML/MIN/1.73M2
GLUCOSE BLD-MCNC: 136 MG/DL (ref 70–99)
GLUCOSE BLD-MCNC: 136 MG/DL (ref 70–99)
HGB BLD-MCNC: 10.2 G/DL (ref 13.3–17.7)
POTASSIUM BLD-SCNC: 3.9 MMOL/L (ref 3.4–5.3)
SODIUM SERPL-SCNC: 136 MMOL/L (ref 133–144)

## 2022-09-20 PROCEDURE — 250N000013 HC RX MED GY IP 250 OP 250 PS 637: Performed by: ORTHOPAEDIC SURGERY

## 2022-09-20 PROCEDURE — 999N000040 HC STATISTIC CONSULT NO CHARGE VASC ACCESS

## 2022-09-20 PROCEDURE — 97161 PT EVAL LOW COMPLEX 20 MIN: CPT | Mod: GP

## 2022-09-20 PROCEDURE — 250N000013 HC RX MED GY IP 250 OP 250 PS 637: Performed by: PHYSICIAN ASSISTANT

## 2022-09-20 PROCEDURE — 97116 GAIT TRAINING THERAPY: CPT | Mod: GP

## 2022-09-20 PROCEDURE — 82947 ASSAY GLUCOSE BLOOD QUANT: CPT | Performed by: PHYSICIAN ASSISTANT

## 2022-09-20 PROCEDURE — 250N000011 HC RX IP 250 OP 636: Performed by: SPECIALIST

## 2022-09-20 PROCEDURE — 97530 THERAPEUTIC ACTIVITIES: CPT | Mod: GP

## 2022-09-20 PROCEDURE — 36415 COLL VENOUS BLD VENIPUNCTURE: CPT | Performed by: ORTHOPAEDIC SURGERY

## 2022-09-20 PROCEDURE — 99207 PR NO CHARGE LOS: CPT | Performed by: PHYSICIAN ASSISTANT

## 2022-09-20 PROCEDURE — 97535 SELF CARE MNGMENT TRAINING: CPT | Mod: GO | Performed by: OCCUPATIONAL THERAPIST

## 2022-09-20 PROCEDURE — 250N000011 HC RX IP 250 OP 636: Performed by: ORTHOPAEDIC SURGERY

## 2022-09-20 PROCEDURE — 120N000001 HC R&B MED SURG/OB

## 2022-09-20 PROCEDURE — 272N000450 HC KIT 4FR POWER PICC SINGLE LUMEN

## 2022-09-20 PROCEDURE — 82310 ASSAY OF CALCIUM: CPT | Performed by: PHYSICIAN ASSISTANT

## 2022-09-20 PROCEDURE — 36569 INSJ PICC 5 YR+ W/O IMAGING: CPT

## 2022-09-20 PROCEDURE — 250N000009 HC RX 250: Performed by: SPECIALIST

## 2022-09-20 PROCEDURE — 85018 HEMOGLOBIN: CPT | Performed by: ORTHOPAEDIC SURGERY

## 2022-09-20 PROCEDURE — 99255 IP/OBS CONSLTJ NEW/EST HI 80: CPT | Performed by: SPECIALIST

## 2022-09-20 PROCEDURE — 97165 OT EVAL LOW COMPLEX 30 MIN: CPT | Mod: GO | Performed by: OCCUPATIONAL THERAPIST

## 2022-09-20 RX ORDER — CEFAZOLIN SODIUM 2 G/100ML
2 INJECTION, SOLUTION INTRAVENOUS EVERY 8 HOURS
Qty: 12300 ML | Refills: 0 | DISCHARGE
Start: 2022-09-20 | End: 2022-10-31

## 2022-09-20 RX ORDER — CEFAZOLIN SODIUM 2 G/100ML
2 INJECTION, SOLUTION INTRAVENOUS EVERY 8 HOURS
Status: DISCONTINUED | OUTPATIENT
Start: 2022-09-20 | End: 2022-09-22 | Stop reason: HOSPADM

## 2022-09-20 RX ADMIN — SENNOSIDES AND DOCUSATE SODIUM 1 TABLET: 50; 8.6 TABLET ORAL at 22:26

## 2022-09-20 RX ADMIN — CEFAZOLIN SODIUM 2 G: 2 INJECTION, SOLUTION INTRAVENOUS at 17:34

## 2022-09-20 RX ADMIN — CEFAZOLIN SODIUM 2 G: 2 INJECTION, SOLUTION INTRAVENOUS at 08:28

## 2022-09-20 RX ADMIN — KETOROLAC TROMETHAMINE 15 MG: 15 INJECTION, SOLUTION INTRAMUSCULAR; INTRAVENOUS at 17:38

## 2022-09-20 RX ADMIN — ASPIRIN 325 MG: 325 TABLET, COATED ORAL at 08:28

## 2022-09-20 RX ADMIN — LIDOCAINE HYDROCHLORIDE ANHYDROUS 2 ML: 10 INJECTION, SOLUTION INFILTRATION at 10:05

## 2022-09-20 RX ADMIN — ACETAMINOPHEN 975 MG: 325 TABLET, FILM COATED ORAL at 22:26

## 2022-09-20 RX ADMIN — SENNOSIDES AND DOCUSATE SODIUM 1 TABLET: 50; 8.6 TABLET ORAL at 08:28

## 2022-09-20 RX ADMIN — ACETAMINOPHEN 975 MG: 325 TABLET, FILM COATED ORAL at 14:41

## 2022-09-20 RX ADMIN — KETOROLAC TROMETHAMINE 15 MG: 15 INJECTION, SOLUTION INTRAMUSCULAR; INTRAVENOUS at 11:33

## 2022-09-20 RX ADMIN — KETOROLAC TROMETHAMINE 15 MG: 15 INJECTION, SOLUTION INTRAMUSCULAR; INTRAVENOUS at 22:29

## 2022-09-20 RX ADMIN — ACETAMINOPHEN 975 MG: 325 TABLET, FILM COATED ORAL at 06:07

## 2022-09-20 RX ADMIN — ATORVASTATIN CALCIUM 40 MG: 40 TABLET, FILM COATED ORAL at 22:26

## 2022-09-20 ASSESSMENT — ACTIVITIES OF DAILY LIVING (ADL)
ADLS_ACUITY_SCORE: 21
PREVIOUS_RESPONSIBILITIES: DRIVING;WORK
ADLS_ACUITY_SCORE: 21

## 2022-09-20 NOTE — CONSULTS
Hospitalist consult note, chart check:    Aba Gan is a 52-year-old male with a past medical history of hyperlipidemia, obesity, and sleep apnea who underwent a right hip explant with I&D and placement of antibiotic spacer on 9/19/2022.  Patient had an infection associated with the previous right hip arthroplasty prosthesis.  The procedure was performed by Dr. Wang under general anesthesia.  EBL 1000 mL.  Preop hemoglobin 10.8.  Creatinine 0.64.  Recent COVID test negative.  Infectious disease has been consulted, and would defer antibiotic recommendations to their team.  Patient received Ancef for surgical prophylaxis.  Home medications reordered as appropriate.    The hospitalist service will defer formal consult at this time, but will chart check the patient in the morning to review vital signs and lab results.  We are available if any acute medical concerns arise.    Derrick Smith PA-C

## 2022-09-20 NOTE — PROGRESS NOTES
[facility]    Received request for benefit check should Aba require home IV abx. Pt has IV abx coverage through [facility] plan- no ded, 80/20 coverage, OOP $3750 met $1618.29 so far, once met, coverage should be 100%.    I met with Aba at bedside to introduce home infusion, review benefits, and offer choice of agency. Pt would like to proceed with [facility] for home IV abx needs. Pt was on services with [facility] in Dec 2021. I will coordinate IV education with pt and potentially his wife or daughter on day of discharge.     Thank you for the referral    Alexandra Lane RN  [facility] Liaison  [phone] (Mon thru Fri 8am - 5pm)  [phone] Office

## 2022-09-20 NOTE — PROGRESS NOTES
Ortho    50% WB right LE    Surgical note:  Active deep infection, treated with explantation, synovectomy, placement abx spacer    cabrera

## 2022-09-20 NOTE — ANESTHESIA POSTPROCEDURE EVALUATION
Patient: Aba Gan    Procedure: Procedure(s):  RIGHT HIP EXPLANT,  IRRIGATION AND DEBRIDEMENT, PLACEMENT OF ANTIBIOTIC SPACER       Anesthesia Type:  General    Note:  Disposition: Inpatient   Postop Pain Control: Uneventful            Sign Out: Well controlled pain   PONV: No   Neuro/Psych: Uneventful            Sign Out: Acceptable/Baseline neuro status   Airway/Respiratory: Uneventful            Sign Out: Acceptable/Baseline resp. status   CV/Hemodynamics: Uneventful            Sign Out: Acceptable CV status   Other NRE: NONE   DID A NON-ROUTINE EVENT OCCUR? No           Last vitals:  Vitals Value Taken Time   /74 09/19/22 2011   Temp 37  C (98.6  F) 09/19/22 2011   Pulse 88 09/19/22 2011   Resp 21 09/19/22 2011   SpO2 98 % 09/19/22 2011       Electronically Signed By: Gilberto Velasco MD  September 19, 2022  8:48 PM

## 2022-09-20 NOTE — PROGRESS NOTES
Discharge goals - Orthopedic  (Discharge Orders - Overnight Stay (OUTPATIENT in bed))  PRIOR TO DISCHARGE        Comments: Discharge Goals that MUST be met PRIOR to Discharge IF PATIENT IS REGISTERED AS OUTPATIENT:   ~ Patient vital signs are at baseline, met  ~ Patient able to ambulate as they were prior to admission or with assist devices provided by therapies during their stay. met  ~ Patient MUST void prior to discharge, met  ~ Patient able to tolerate oral intake to maintain hydration status, met  ~ Patient has adequate pain control using Oral analgesics, met  ~ Hypercapnia, hypoventilation or hypoxia resolved for at least 2 hours without supplemental oxygen, met  ~ Deficits in sensation, mobility or coordination have resolved if spinal or regional anesthesia was used, met  ~ Patient has returned to baseline mental status, met  ~ Notify Provider IF patient FAILS to meet Discharge Goal criteria,   ~ IF Provider has already entered all discharge goals and/or cleared patient for discharge, you do NOT need to notify Provider PRIOR to patient discharge.

## 2022-09-20 NOTE — PROGRESS NOTES
Hospitalist chart check.     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 EBL 1000ml.   PMHx significant for LINDSEY, HLD obesity.   VSS. hgb 10.8>10.2.   ID following for abx management. Plan for PICC placement and IV cefazolin x6 weeks    Discussed with nursing this morning. He is doing well today. Up with PT moving well. Pain is managed. VSS.     Will defer discharge planning to primary team. No acute medical issues at this time.     Hospitalist will sign off. Please do not hesitate to call with questions or concerns. I have reconciled patient's home lipitor on discharge navigator, remainder per primary team.     Marysol Mane PA-C

## 2022-09-20 NOTE — PROGRESS NOTES
09/20/22 1049   Quick Adds   Type of Visit Initial Occupational Therapy Evaluation   Living Environment   People in Home spouse;child(shea), adult   Current Living Arrangements house   Home Accessibility stairs to enter home   Number of Stairs, Main Entrance 2   Stair Railings, Main Entrance none   Transportation Anticipated car, drives self;family or friend will provide   Living Environment Comments can be all on the main level, has shower chair   Self-Care   Usual Activity Tolerance good   Current Activity Tolerance good   Equipment Currently Used at Home shower chair;cane, straight;crutches;walker, rolling   Fall history within last six months no   Instrumental Activities of Daily Living (IADL)   Previous Responsibilities driving;work   General Information   Onset of Illness/Injury or Date of Surgery 09/19/22   Referring Physician Eric Olivares MD   Patient/Family Therapy Goal Statement (OT) none stated   Additional Occupational Profile Info/Pertinent History of Current Problem 52-year-old male with a past medical history of hyperlipidemia, obesity, and sleep apnea who underwent a right hip explant with I&D and placement of antibiotic spacer on 9/19/2022.  Patient had an infection associated with the previous right hip arthroplasty prosthesis   Performance Patterns (Routines, Roles, Habits) was Ind and working prior to surgery   Existing Precautions/Restrictions fall;weight bearing   General Observations and Info pt agreeable to OT eval   Cognitive Status Examination   Orientation Status orientation to person, place and time   Visual Perception   Visual Impairment/Limitations WNL   Sensory   Sensory Quick Adds No deficits were identified   Pain Assessment   Patient Currently in Pain Yes, see Vital Sign flowsheet   Integumentary/Edema   Integumentary/Edema no deficits were identifed   Posture   Posture not impaired   Range of Motion Comprehensive   General Range of Motion no range of motion deficits identified    Strength Comprehensive (MMT)   General Manual Muscle Testing (MMT) Assessment no strength deficits identified   Muscle Tone Assessment   Muscle Tone Quick Adds No deficits were identified   Coordination   Upper Extremity Coordination No deficits were identified   Bed Mobility   Comment (Bed Mobility) SBA, needing bed raised for standing   Transfers   Transfers sit-stand transfer   Sit-Stand Transfer   Sit-Stand Kimble (Transfers) supervision   Assistive Device (Sit-Stand Transfers) walker, front-wheeled   Balance   Balance Assessment no deficits were identified   Activities of Daily Living   BADL Assessment/Intervention lower body dressing   Lower Body Dressing Assessment/Training   Kimble Level (Lower Body Dressing) maximum assist (25% patient effort)   Clinical Impression   Criteria for Skilled Therapeutic Interventions Met (OT) Yes, treatment indicated   OT Diagnosis decreased I with ADLs and functional mobility   OT Problem List-Impairments impacting ADL problems related to;activity tolerance impaired;flexibility;pain;post-surgical precautions   Assessment of Occupational Performance 3-5 Performance Deficits   Identified Performance Deficits impaired dressing, BR transfers, home mgmt   Planned Therapy Interventions (OT) ADL retraining;transfer training   Clinical Decision Making Complexity (OT) moderate complexity   Risk & Benefits of therapy have been explained evaluation/treatment results reviewed;care plan/treatment goals reviewed;risks/benefits reviewed;current/potential barriers reviewed;participants voiced agreement with care plan;participants included;patient;daughter   OT Discharge Planning   OT Discharge Recommendation (DC Rec) home with assist   OT Rationale for DC Rec pt completed eval and treatment. he lives at home with supportive family who is able to help him PRN. After session, pt was able to demo mod I with dressing-donning socks and shorts. was able to show SBA sit to stand from  chair for mock toilet transfer. pt will have family to help as able. all OT goals met. d/c OT   Total Evaluation Time (Minutes)   Total Evaluation Time (Minutes) 10   OT Goals   Therapy Frequency (OT) One time eval and treatment   OT Predicted Duration/Target Date for Goal Attainment 09/20/22   OT Goals Lower Body Dressing;Toilet Transfer/Toileting   OT: Lower Body Dressing Modified independent;using adaptive equipment;Goal Met   OT: Toilet Transfer/Toileting Modified independent;toilet transfer;Goal Met

## 2022-09-20 NOTE — PROGRESS NOTES
Therapy: IV abx  Insurance: [facility]    Co-Insurance: 80/20  Max Out of Pocket: $3750  Met: $ $1,618.29      In reference to admission on 9/19/22 to check IV abx coverage     Please contact Intake with any questions, [phone] or In Basket pool, [facility] ([id number]).

## 2022-09-20 NOTE — PROCEDURES
[facility]    Single Lumen PICC Placement    Date/Time: 9/20/2022 10:15 AM  Performed by: Monet Rodriguez RN  Authorized by: Suzy Agustin MD   Indications: vascular access      UNIVERSAL PROTOCOL   Site Marked: Yes  Prior Images Obtained and Reviewed:  Yes  Required items: Required blood products, implants, devices and special equipment available    Patient identity confirmed:  Verbally with patient, arm band and hospital-assigned identification number  NA - No sedation, light sedation, or local anesthesia  Confirmation Checklist:  Patient's identity using two indicators, relevant allergies, procedure was appropriate and matched the consent or emergent situation and correct equipment/implants were available  Time out: Immediately prior to the procedure a time out was called    Universal Protocol: the Joint Commission Universal Protocol was followed    Preparation: Patient was prepped and draped in usual sterile fashion    ESBL (mL):  3     ANESTHESIA    Anesthesia: Local infiltration  Local Anesthetic:  Lidocaine 1% without epinephrine  Anesthetic Total (mL):  2      SEDATION    Patient Sedated: No        Preparation: skin prepped with ChloraPrep  Skin prep agent: skin prep agent completely dried prior to procedure  Sterile barriers: maximum sterile barriers were used: cap, mask, sterile gown, sterile gloves, and large sterile sheet  Hand hygiene: hand hygiene performed prior to central venous catheter insertion  Type of line used: Power PICC and PICC  Catheter type: single lumen  Lumen type: valved  Catheter size: 4 Fr  Brand: Bard  Lot number: [id number]  Placement method: venipuncture, MST, ultrasound and tip navigation system  Number of attempts: 1  Difficulty threading catheter: no  Successful placement: yes  Orientation: right    Location: basilic vein  Tip Location: superior cavoatrial junction and superior vena cava  Arm circumference: adults 10 cm  Extremity circumference:  35  Visible catheter length: 5  Internal length: 45 cm  Total catheter length: 50  Dressing and securement: blood cleaned with CHG, site cleansed, chlorhexidine disc applied, blood removed and subcutaneous anchor securement system  Post procedure assessment: blood return through all ports and placement verified by 3CG technology  PROCEDURE   Patient Tolerance:  Patient tolerated the procedure well with no immediate complicationsDescribe Procedure: Patient instructed on PICC and verbalized an understanding. Patent gave verbal and written written consent. 4FR single lumen PICC placed. Patient tolerated well. Tip placement confirmed by 3CG. Blood return noted.Patient tolerated well. RN caring for patent informed PICC is ok for immediate use.

## 2022-09-20 NOTE — CONSULTS
Care Management Initial Consult    General Information  Assessment completed with: Patient,    Type of CM/SW Visit: Initial Assessment    Primary Care Provider verified and updated as needed: Yes   Readmission within the last 30 days: no previous admission in last 30 days      Reason for Consult: discharge planning  Advance Care Planning:            Communication Assessment  Patient's communication style: spoken language (English or Bilingual)    Hearing Difficulty or Deaf: no   Wear Glasses or Blind: yes    Cognitive  Cognitive/Neuro/Behavioral: WDL                      Living Environment:   People in home: spouse     Current living Arrangements: house      Able to return to prior arrangements: yes       Family/Social Support:  Care provided by: self, spouse/significant other  Provides care for:    Marital Status:   Wife, Children          Description of Support System: Supportive, Involved         Current Resources:   Patient receiving home care services:       Community Resources:    Equipment currently used at home: shower chair, cane, straight, crutches, walker, rolling  Supplies currently used at home:      Employment/Financial:  Employment Status:          Financial Concerns:             Lifestyle & Psychosocial Needs:  Social Determinants of Health     Tobacco Use: Low Risk      Smoking Tobacco Use: Never Smoker     Smokeless Tobacco Use: Never Used   Alcohol Use: Not on file   Financial Resource Strain: Not on file   Food Insecurity: Not on file   Transportation Needs: Not on file   Physical Activity: Not on file   Stress: Not on file   Social Connections: Not on file   Intimate Partner Violence: Not on file   Depression: Not on file   Housing Stability: Not on file       Functional Status:  Prior to admission patient needed assistance:              Mental Health Status:          Chemical Dependency Status:                Values/Beliefs:  Spiritual, Cultural Beliefs, Hinduism Practices, Values that  affect care:    Description of Beliefs that Will Affect Care: Jainism            Additional Information:  Writer met with patient at bedside.  Patient is aware of the need for home infusion at discharge.  Informed patient referral has been made to [facility] and patient stated he has already spoken with Alexandra.  Patient stated Dr. Wang was in his room a few moments ago and told him to plan a Thursday discharge.  Writer relayed the info to Alexandra at [facility].    CC will follow for discharge plans    Nia Rossi RN

## 2022-09-20 NOTE — PROGRESS NOTES
Report called to ANNE Walker. Family notified via telephone call that room is being cleaned and to wait for a 2nd phone call before going to 1517.

## 2022-09-20 NOTE — PROGRESS NOTES
Pt AO4.  Dressing CDI, Preveena WV in place, no output.  Using Oxy for pain, effective.  VSS on RA.  Voiding using urinal.  Dangled at bedside.  SL.  Partial weight bearing status to RLE with walker.  Hip precautions maintained.  Infectious disease to see.

## 2022-09-20 NOTE — CONSULTS
[facility]    Infectious Disease Consultation     Date of Admission:  9/19/2022  Date of Consult: 09/20/22    Assessment:  52YM with R SHERLY in 2021 complicated by acute infection and sepsis with S.aureus MSSA soon after surgery , treated with I&D with poly exchange on 12/29 followed by prolonged IV Cefazolin/Rifampin oral suppressive antibiotics  who has now developed relapsed infection and is s/p explant of R SHERLY with placement of antibiotic cement. Cxs are growing S.aureus MSSA.    Recommendations  1. Plan PICC  2. Continue Cefazolin 2 grams Q8H X 6 weeks  3. Care integration for discharge planning    Suzy Agustin MD    Reason for Consult    I was asked to evaluate this patient for treatment recommendations for prosthetic hip infection    Primary Care Physician   Mario Logan    Chief Complaint   R SHERLY infection    History is obtained from the patient and medical records    History of Present Illness   Aba Gan is a 52 year old male with prior right total his arthroplasty, who developed acute  infection with bacteremia/sepsis with Staph aureus MSSA in 12/2021 6 weeks after initial surgery, treated with I&D with poly exchange on 12/29, and prolonged IV then oral antibiotic suppression.    He was doing well until July when he developed soreness of the hip an was diagnosed with tendinitis. Those symptoms were improving until last week he developed acute swelling , had a hip aspiration which was suggestive of infection and he is now s/p right hip explant with debridement and placement of antibiotic spacer. Cxs are growing Staph aureus MSSA. Patient has been initiated on Cefazolin and ID has been asked to assist with antibiotic management.    Past Medical History   I have reviewed this patient's medical history and updated it with pertinent information if needed.   Past Medical History:   Diagnosis Date     Blood in urine     SINCE 1980.  ALWAYS HAS A SMALL AMOUNT OF rbc'S IN URINE      Hyperlipidemia      Obese      Plantar fasciitis      Sleep apnea     DOESN'T USE CPAP       Past Surgical History   I have reviewed this patient's surgical history and updated it with pertinent information if needed.  Past Surgical History:   Procedure Laterality Date     ARTHROPLASTY REVISION HIP ANTERIOR Right 12/27/2021    Procedure: RIGHT HIP IRRIGATION AND DEBRIDEMENT WITH POLY AND HEAD EXCHANGE.;  Surgeon: Eric Almaraz MD;  Location: [facility] OR     AS TOTAL HIP ARTHROPLASTY Right      COLONOSCOPY       GENITOURINARY SURGERY       KNEE ARTHROSCOPY AND ARTHROTOMY       TONSILLECTOMY      AS A CHILD     VASECTOMY         Prior to Admission Medications   Prior to Admission Medications   Prescriptions Last Dose Informant Patient Reported? Taking?   acetaminophen (TYLENOL) 325 MG tablet 9/18/2022 at 1900  No Yes   Sig: Take 2 tablets (650 mg) by mouth every 4 hours as needed for other (For optimal non-opioid multimodal pain management to improve pain control.)   aspirin (ASA) 325 MG EC tablet 9/12/2022 at am  No No   Sig: Take 1 tablet (325 mg) by mouth daily   atorvastatin (LIPITOR) 40 MG tablet 9/18/2022 at 1900  Yes Yes   Sig: Take 40 mg by mouth daily   celecoxib (CELEBREX) 200 MG capsule Past Month at Unknown time  No Yes   Sig: Take 1 capsule (200 mg) by mouth 2 times daily   fish oil-omega-3 fatty acids 1000 MG capsule 9/18/2022 at 1900  Yes Yes   Sig: Take 2,000 mg by mouth daily      Facility-Administered Medications: None     Allergies   No Known Allergies    Immunization History   Immunization History   Administered Date(s) Administered     COVID-19,PF,Moderna 02/16/2021, 03/17/2021       Social History   I have reviewed this patient's social history and updated it with pertinent information if needed. Aba Gan  reports that he has never smoked. He has never used smokeless tobacco. He reports current alcohol use. He reports that he does not use drugs.    Family History   I have reviewed this patient's  family history and updated it with pertinent information if needed.   History reviewed. No pertinent family history.    Review of Systems   The 10 point Review of Systems is negative other than noted in the HPI or here.     Physical Exam   Temp: 98.8  F (37.1  C) Temp src: Oral BP: 102/54 Pulse: 100   Resp: 18 SpO2: 92 % O2 Device: None (Room air) Oxygen Delivery: 1 LPM  Vital Signs with Ranges  Temp:  [97.4  F (36.3  C)-98.8  F (37.1  C)] 98.8  F (37.1  C)  Pulse:  [] 100  Resp:  [12-23] 18  BP: (101-130)/(54-97) 102/54  SpO2:  [92 %-99 %] 92 %  272 lbs 4.8 oz  Body mass index is 33.15 kg/m .    GENERAL APPEARANCE:  awake  EYES: Eyes grossly normal to inspection  NECK: no adenopathy  RESP: lungs clear   CV: regular rates and rhythm  LYMPHATICS: normal ant/post cervical and supraclavicular nodes  ABDOMEN: soft, nontender  MS: R hip in surgical dressing, vac  SKIN: no suspicious lesions or rashes        Data   All laboratory data reviewed  Component      Latest Ref Rng & Units 9/12/2022   CRP Inflammation      <5.00 mg/L 13.69 (H)   Sed Rate      0 - 20 mm/hr 72 (H)     Microbiology  9/12 R hip   Hip, Right; Synovial fluid          0 Result Notes    Culture 3+ Staphylococcus aureus Abnormal             Resulting Agency: [facility]       Susceptibility     Staphylococcus aureus     YAMILET     Clindamycin <=0.25 ug/mL Susceptible     Erythromycin <=0.25 ug/mL Susceptible     Gentamicin <=0.5 ug/mL Susceptible     Oxacillin 0.5 ug/mL Susceptible 1     Tetracycline <=1 ug/mL Susceptible     Trimethoprim/Sulfamethoxazole <=0.5/9.5 u... Susceptible     Vancomycin <=0.5 ug/mL Susceptible

## 2022-09-20 NOTE — PROGRESS NOTES
09/20/22 0900   Quick Adds   Type of Visit Initial PT Evaluation   Living Environment   People in Home spouse;child(shea), adult   Current Living Arrangements house   Home Accessibility stairs to enter home   Number of Stairs, Main Entrance 2   Stair Railings, Main Entrance none   Transportation Anticipated family or friend will provide   Living Environment Comments Once in home, has no concerns. Supportive family, will have family memebers not present all the time however frequently in and out of home.   Self-Care   Usual Activity Tolerance good   Current Activity Tolerance good   Equipment Currently Used at Home shower chair;cane, straight;walker, standard   Fall history within last six months no   Activity/Exercise/Self-Care Comment PLOF IND with all cares   General Information   Onset of Illness/Injury or Date of Surgery 09/19/22   Referring Physician Eric Almaraz MD   Patient/Family Therapy Goals Statement (PT) Return to home   Pertinent History of Current Problem (include personal factors and/or comorbidities that impact the POC) 52-year-old male with a past medical history of hyperlipidemia, obesity, and sleep apnea who underwent a right hip explant with I&D and placement of antibiotic spacer on 9/19/2022.  Patient had an infection associated with the previous right hip arthroplasty prosthesis   Existing Precautions/Restrictions no active hip ABD;90 degree hip flexion;fall;weight bearing   Weight-Bearing Status - RLE partial weight-bearing (% in comments)  (50)   Cognition   Affect/Mental Status (Cognition) WNL   Orientation Status (Cognition) oriented x 4   Follows Commands (Cognition) WNL   Pain Assessment   Patient Currently in Pain Yes, see Vital Sign flowsheet  (Pain in R groin, 2/10)   Posture    Posture Comments Good upright posture   Range of Motion (ROM)   ROM Comment LLE WFL, RLE limited secondary to pain and precautions, WFL   Strength (Manual Muscle Testing)   Strength Comments LLE against  gravity, RLE limited secondary to pain, WFL   Bed Mobility   Comment, (Bed Mobility) SBA supine-sit   Transfers   Comment, (Transfers) Sit-stand with FWW, SBA   Gait/Stairs (Locomotion)   Distance in Feet (Required for LE Total Joints) 10, 90   Comment, (Gait/Stairs) CGA gait with FWW   Balance   Balance Comments Good sitting balance, good standing balance   Sensory Examination   Sensory Perception Comments No changes from baseline   Clinical Impression   Criteria for Skilled Therapeutic Intervention Yes, treatment indicated   PT Diagnosis (PT) Decreased functional mobility   Influenced by the following impairments Pain, decreased strength and ROM, decreased activity tolerance, hip precautions   Functional limitations due to impairments Decreased functional mobility   Clinical Presentation (PT Evaluation Complexity) Stable/Uncomplicated   Clinical Presentation Rationale Clinical judgement   Clinical Decision Making (Complexity) low complexity   Planned Therapy Interventions (PT) balance training;bed mobility training;gait training;patient/family education;ROM (range of motion);stair training;strengthening;transfer training;progressive activity/exercise;risk factor education;home program guidelines   Anticipated Equipment Needs at Discharge (PT) crutches, axillary   Risk & Benefits of therapy have been explained evaluation/treatment results reviewed;care plan/treatment goals reviewed;risks/benefits reviewed;current/potential barriers reviewed;participants voiced agreement with care plan;participants included;patient;daughter   PT Discharge Planning   PT Discharge Recommendation (DC Rec) home;home with assist;home with home care physical therapy;home with outpatient physical therapy   PT Rationale for DC Rec Pt below baseline with all functional mobility, assist of 1 and mobilizing with FWW when PLOF IND. Overall mobilizing well, anticipate SBA at discharge to return home with assist of supportive family. Recommend  continued HHPT as family may not always be present. If family able to assist with transportation, OP PT to progress strength, balance and funcitonal mobility towards independence.   PT Brief overview of current status Allyn bed mobility, CGA gait and stairs with FWW   Plan of Care Review   Plan of Care Reviewed With patient;daughter   Total Evaluation Time   Total Evaluation Time (Minutes) 5   Physical Therapy Goals   PT Frequency Daily   PT Predicted Duration/Target Date for Goal Attainment 09/27/22   PT Goals Bed Mobility;Transfers;Gait;Stairs   PT: Bed Mobility Supervision/stand-by assist;Supine to/from sit;Within precautions   PT: Transfers Supervision/stand-by assist;Assistive device;Bed to/from chair;Within precautions;Sit to/from stand   PT: Gait Supervision/stand-by assist;Assistive device;Greater than 200 feet;Standard walker   PT: Stairs 2 stairs;Supervision/stand-by assist;Assistive device;Within precautions

## 2022-09-21 ENCOUNTER — APPOINTMENT (OUTPATIENT)
Dept: PHYSICAL THERAPY | Facility: CLINIC | Age: 52
End: 2022-09-21
Attending: ORTHOPAEDIC SURGERY
Payer: COMMERCIAL

## 2022-09-21 LAB
GLUCOSE BLDC GLUCOMTR-MCNC: 125 MG/DL (ref 70–99)
HGB BLD-MCNC: 8.8 G/DL (ref 13.3–17.7)
POTASSIUM BLD-SCNC: 3.9 MMOL/L (ref 3.4–5.3)

## 2022-09-21 PROCEDURE — 120N000001 HC R&B MED SURG/OB

## 2022-09-21 PROCEDURE — 250N000013 HC RX MED GY IP 250 OP 250 PS 637: Performed by: PHYSICIAN ASSISTANT

## 2022-09-21 PROCEDURE — 84132 ASSAY OF SERUM POTASSIUM: CPT | Performed by: ORTHOPAEDIC SURGERY

## 2022-09-21 PROCEDURE — 250N000013 HC RX MED GY IP 250 OP 250 PS 637: Performed by: ORTHOPAEDIC SURGERY

## 2022-09-21 PROCEDURE — 999N000190 HC STATISTIC VAT ROUNDS

## 2022-09-21 PROCEDURE — 85018 HEMOGLOBIN: CPT | Performed by: ORTHOPAEDIC SURGERY

## 2022-09-21 PROCEDURE — 250N000011 HC RX IP 250 OP 636: Performed by: ORTHOPAEDIC SURGERY

## 2022-09-21 PROCEDURE — 97116 GAIT TRAINING THERAPY: CPT | Mod: GP | Performed by: PHYSICAL THERAPY ASSISTANT

## 2022-09-21 PROCEDURE — 36415 COLL VENOUS BLD VENIPUNCTURE: CPT | Performed by: ORTHOPAEDIC SURGERY

## 2022-09-21 PROCEDURE — 250N000011 HC RX IP 250 OP 636: Performed by: SPECIALIST

## 2022-09-21 RX ORDER — AMOXICILLIN 250 MG
1-2 CAPSULE ORAL 2 TIMES DAILY
Qty: 30 TABLET | Refills: 0 | Status: SHIPPED | OUTPATIENT
Start: 2022-09-21 | End: 2022-11-28

## 2022-09-21 RX ORDER — OXYCODONE HYDROCHLORIDE 5 MG/1
5-10 TABLET ORAL EVERY 4 HOURS PRN
Qty: 20 TABLET | Refills: 0 | Status: SHIPPED | OUTPATIENT
Start: 2022-09-21 | End: 2022-11-28

## 2022-09-21 RX ORDER — OXYCODONE HYDROCHLORIDE 5 MG/1
5-10 TABLET ORAL EVERY 4 HOURS PRN
Qty: 26 TABLET | Refills: 0 | Status: SHIPPED | OUTPATIENT
Start: 2022-09-21 | End: 2022-09-21

## 2022-09-21 RX ORDER — ACETAMINOPHEN 325 MG/1
975 TABLET ORAL EVERY 6 HOURS PRN
Qty: 100 TABLET | Refills: 0 | Status: SHIPPED | OUTPATIENT
Start: 2022-09-21

## 2022-09-21 RX ORDER — CELECOXIB 200 MG/1
200 CAPSULE ORAL DAILY
Qty: 42 CAPSULE | Refills: 0 | Status: SHIPPED | OUTPATIENT
Start: 2022-09-21 | End: 2022-11-28

## 2022-09-21 RX ADMIN — SENNOSIDES AND DOCUSATE SODIUM 1 TABLET: 50; 8.6 TABLET ORAL at 21:48

## 2022-09-21 RX ADMIN — KETOROLAC TROMETHAMINE 15 MG: 15 INJECTION, SOLUTION INTRAMUSCULAR; INTRAVENOUS at 05:37

## 2022-09-21 RX ADMIN — CEFAZOLIN SODIUM 2 G: 2 INJECTION, SOLUTION INTRAVENOUS at 17:10

## 2022-09-21 RX ADMIN — ASPIRIN 325 MG: 325 TABLET, COATED ORAL at 08:28

## 2022-09-21 RX ADMIN — ACETAMINOPHEN 975 MG: 325 TABLET, FILM COATED ORAL at 05:37

## 2022-09-21 RX ADMIN — ACETAMINOPHEN 975 MG: 325 TABLET, FILM COATED ORAL at 21:47

## 2022-09-21 RX ADMIN — ACETAMINOPHEN 975 MG: 325 TABLET, FILM COATED ORAL at 14:28

## 2022-09-21 RX ADMIN — POLYETHYLENE GLYCOL 3350 17 G: 17 POWDER, FOR SOLUTION ORAL at 08:28

## 2022-09-21 RX ADMIN — SENNOSIDES AND DOCUSATE SODIUM 1 TABLET: 50; 8.6 TABLET ORAL at 08:28

## 2022-09-21 RX ADMIN — CEFAZOLIN SODIUM 2 G: 2 INJECTION, SOLUTION INTRAVENOUS at 00:21

## 2022-09-21 RX ADMIN — ATORVASTATIN CALCIUM 40 MG: 40 TABLET, FILM COATED ORAL at 21:48

## 2022-09-21 RX ADMIN — CEFAZOLIN SODIUM 2 G: 2 INJECTION, SOLUTION INTRAVENOUS at 08:28

## 2022-09-21 ASSESSMENT — ACTIVITIES OF DAILY LIVING (ADL)
ADLS_ACUITY_SCORE: 21
ADLS_ACUITY_SCORE: 23
ADLS_ACUITY_SCORE: 23
ADLS_ACUITY_SCORE: 21
ADLS_ACUITY_SCORE: 23
ADLS_ACUITY_SCORE: 23
ADLS_ACUITY_SCORE: 21

## 2022-09-21 NOTE — PLAN OF CARE
Goal Outcome Evaluation:    Patient vital signs are at baseline: Yes, on RA  Patient able to ambulate as they were prior to admission or with assist devices provided by therapies during their stay: Yes, Ax1 w/ GB/ walker  Patient MUST void prior to discharge:  Yes, uses the urinal  Patient able to tolerate oral intake:  Yes  Pain has adequate pain control using Oral analgesics:  Yes, pain managed w/ schedule toradol & tylenol    A&Ox4. Dressing on R hip CDI. CMS intact. Wound vac in place & no output. PIV SL & PICC on R arm intact. Pending discharge home w/ home infusion.

## 2022-09-21 NOTE — PLAN OF CARE
Goal Outcome Evaluation:    A&Ox4. VSS on Rm Air. C/o 2 of 10 pain to R hip; intermittent scheduled tordal given w/ good effect. CMS intact. Prevena wound vac patent; no drainage. Up w/ asstx1 GB/walker. Voiding spontaneously up in BR. Regular diet; tolerating well. PICC to RUE w/ intermittent abx. Patient to discharge home w/ home infusion.

## 2022-09-21 NOTE — PROGRESS NOTES
"ORTHOPEDIC LOWER EXTREMITY PROGRESS NOTE    POD#2  Patient is a 52 year old male who underwent Procedure(s):  RIGHT HIP EXPLANT,  IRRIGATION AND DEBRIDEMENT, PLACEMENT OF ANTIBIOTIC SPACER on 2022. Pain is well controlled. Tolerating medication well, no nausea or vomiting.  Only used oxycodone once since surgery.  Was up moving with PT yesterday.  Seen by ID.  PICC line placed yesterday.     Vitals:   Blood pressure 116/77, pulse 81, temperature 98.4  F (36.9  C), temperature source Oral, resp. rate 16, height 1.93 m (6' 4\"), weight 123.5 kg (272 lb 4.8 oz), SpO2 96 %.  Temp (24hrs), Av.7  F (37.1  C), Min:98.4  F (36.9  C), Max:99  F (37.2  C)      Drains: prevena wound vac with empty canister    EXAM   The patient is awake and alert.  Calves are soft and non-tender.   Sensation is intact.  Dorsiflexion and plantar flexion is intact.  Foot warm with nl cap refill.  The incision is covered with prineo dressing on lateral incision and prevena dressing on medial incision.     Labs:   Recent Labs   Lab Test 22  0727 22  0757 22  1841 21  0753 21   HGB 8.8* 10.2* 10.8*   < > 11.9*   INR  --   --   --   --  1.14    < > = values in this interval not displayed.     CX: S. Aureus MSSA  ASSESSMENT  S/p R hip explant, I&D, placement of antibiotic spacer   Acute Blood Loss Anemia  PLAN  1. DVT prophylaxis: aspirin  2. Weight Bearin% PWB (Partial weight bearing).  3. Anticipated discharge date tomrorow . Discharge to home with IV home infusion.  4. Cont Pain Control Tylenol.  Oxycodone available if needed  5. ID following, appreciate assistance. Plan is to continue Cefazolin 2 grams Q8H X 6 weeks  6. Acute blood loss anemia - expected after surgery, continue to monitor. Hgb check ordered for tomorrow morning    Deidre Hess PA-C  [facility]        "

## 2022-09-21 NOTE — PROGRESS NOTES
[facility]    Received update from pt's care coordinator that Aba is anticipated to discharge tomorrow. I met with Aba and he stated that his wife would be available to meet with me tomorrow at noon for IV teach.      Thank you for the referral.    Alexandra Lane RN  [facility] Liaison  [phone] (Mon thru Fri 8am - 5pm)  [phone] Office

## 2022-09-21 NOTE — PLAN OF CARE
Patient alert, up with assist, prevena wound vac in place, PiCC line intact, will discharge on antibiotic infusion, elevated temperature, 100.2, encouraged IS and fluids, scheduled Tylenol given. Oxycodone available.

## 2022-09-22 ENCOUNTER — HOME INFUSION (PRE-WILLOW HOME INFUSION) (OUTPATIENT)
Dept: PHARMACY | Facility: CLINIC | Age: 52
End: 2022-09-22

## 2022-09-22 ENCOUNTER — APPOINTMENT (OUTPATIENT)
Dept: PHYSICAL THERAPY | Facility: CLINIC | Age: 52
End: 2022-09-22
Attending: ORTHOPAEDIC SURGERY
Payer: COMMERCIAL

## 2022-09-22 VITALS
TEMPERATURE: 98.6 F | SYSTOLIC BLOOD PRESSURE: 141 MMHG | BODY MASS INDEX: 33.16 KG/M2 | WEIGHT: 272.3 LBS | HEIGHT: 76 IN | HEART RATE: 81 BPM | RESPIRATION RATE: 16 BRPM | DIASTOLIC BLOOD PRESSURE: 81 MMHG | OXYGEN SATURATION: 96 %

## 2022-09-22 LAB
CRP SERPL-MCNC: 52.5 MG/L (ref 0–8)
HGB BLD-MCNC: 8.5 G/DL (ref 13.3–17.7)
HOLD SPECIMEN: NORMAL

## 2022-09-22 PROCEDURE — 85018 HEMOGLOBIN: CPT | Performed by: PHYSICIAN ASSISTANT

## 2022-09-22 PROCEDURE — 97530 THERAPEUTIC ACTIVITIES: CPT | Mod: GP | Performed by: PHYSICAL THERAPY ASSISTANT

## 2022-09-22 PROCEDURE — 0SB90ZZ EXCISION OF RIGHT HIP JOINT, OPEN APPROACH: ICD-10-PCS | Performed by: ORTHOPAEDIC SURGERY

## 2022-09-22 PROCEDURE — 86140 C-REACTIVE PROTEIN: CPT | Performed by: SPECIALIST

## 2022-09-22 PROCEDURE — 97116 GAIT TRAINING THERAPY: CPT | Mod: GP | Performed by: PHYSICAL THERAPY ASSISTANT

## 2022-09-22 PROCEDURE — 99233 SBSQ HOSP IP/OBS HIGH 50: CPT | Performed by: SPECIALIST

## 2022-09-22 PROCEDURE — 0SP90JZ REMOVAL OF SYNTHETIC SUBSTITUTE FROM RIGHT HIP JOINT, OPEN APPROACH: ICD-10-PCS | Performed by: ORTHOPAEDIC SURGERY

## 2022-09-22 PROCEDURE — 250N000011 HC RX IP 250 OP 636: Performed by: SPECIALIST

## 2022-09-22 PROCEDURE — 250N000013 HC RX MED GY IP 250 OP 250 PS 637: Performed by: ORTHOPAEDIC SURGERY

## 2022-09-22 PROCEDURE — 0SR90EZ REPLACEMENT OF RIGHT HIP JOINT WITH ARTICULATING SPACER, OPEN APPROACH: ICD-10-PCS | Performed by: ORTHOPAEDIC SURGERY

## 2022-09-22 RX ADMIN — ACETAMINOPHEN 975 MG: 325 TABLET, FILM COATED ORAL at 06:30

## 2022-09-22 RX ADMIN — ACETAMINOPHEN 975 MG: 325 TABLET, FILM COATED ORAL at 13:43

## 2022-09-22 RX ADMIN — CEFAZOLIN SODIUM 2 G: 2 INJECTION, SOLUTION INTRAVENOUS at 08:08

## 2022-09-22 RX ADMIN — SENNOSIDES AND DOCUSATE SODIUM 1 TABLET: 50; 8.6 TABLET ORAL at 08:07

## 2022-09-22 RX ADMIN — ASPIRIN 325 MG: 325 TABLET, COATED ORAL at 08:07

## 2022-09-22 RX ADMIN — POLYETHYLENE GLYCOL 3350 17 G: 17 POWDER, FOR SOLUTION ORAL at 08:06

## 2022-09-22 RX ADMIN — CEFAZOLIN SODIUM 2 G: 2 INJECTION, SOLUTION INTRAVENOUS at 00:27

## 2022-09-22 ASSESSMENT — ACTIVITIES OF DAILY LIVING (ADL)
ADLS_ACUITY_SCORE: 23
ADLS_ACUITY_SCORE: 21
ADLS_ACUITY_SCORE: 23
ADLS_ACUITY_SCORE: 21
ADLS_ACUITY_SCORE: 21
ADLS_ACUITY_SCORE: 23
ADLS_ACUITY_SCORE: 21

## 2022-09-22 NOTE — PROGRESS NOTES
[facility]    Infectious Disease Progress Note    Date of Service : 09/22/2022     Assessment:  52YM with R SHERLY in 2021 complicated by acute infection and sepsis with S.aureus MSSA soon after surgery , treated with I&D with poly exchange on 12/29 followed by prolonged IV Cefazolin/Rifampin then oral suppressive antibiotics  who developed relapsed infection and is now s/p explant of R SHERLY with placement of antibiotic cement. Cxs are growing S.aureus MSSA.     Recommendations  1. PICC in place  2. Continue Cefazolin 2 grams Q8H X 6 weeks until 10/31  3. Care integration for discharge planning  Schedule ID FUP in 4 weeks    Suzy Agustin MD    Interval History   Resting, hip pain is controlled, PICC was placed, tolerating antibiotics without side effects. Has remained afebrile, denies nausea, vomiting or diarrhea.    Physical Exam   Temp: 98.6  F (37  C) Temp src: Oral BP: (!) 141/81 Pulse: 81   Resp: 16 SpO2: 96 % O2 Device: None (Room air)    Vitals:    09/19/22 0946   Weight: 123.5 kg (272 lb 4.8 oz)     Vital Signs with Ranges  Temp:  [98.6  F (37  C)-100.2  F (37.9  C)] 98.6  F (37  C)  Pulse:  [81-96] 81  Resp:  [16-18] 16  BP: (128-141)/(68-81) 141/81  SpO2:  [94 %-96 %] 96 %    Constitutional: Awake, alert, cooperative, no apparent distress  Lungs: Clear to auscultation bilaterally, no crackles or wheezing  Cardiovascular: Regular rate and rhythm, normal S1 and S2, and no murmur noted  Abdomen: Normal bowel sounds, soft, non-distended, non-tender  Skin: No rashes, no cyanosis, no edema    Other:    Medications       acetaminophen  975 mg Oral Q8H     aspirin  325 mg Oral Daily     atorvastatin  40 mg Oral Daily     ceFAZolin  2 g Intravenous Q8H     polyethylene glycol  17 g Oral Daily     senna-docusate  1 tablet Oral BID     sodium chloride (PF)  10-40 mL Intracatheter Q8H     sodium chloride (PF)  3 mL Intracatheter Q8H       Data   All microbiology laboratory data reviewed.  Recent  Labs   Lab Test 09/22/22  0711 09/21/22  0727 09/20/22  0757 09/19/22  1841 02/08/22  0945 02/01/22  1500 01/25/22  1612   WBC  --   --   --   --  4.9 5.8 5.8   HGB 8.5* 8.8* 10.2*   < > 13.2* 12.5* 12.5*   HCT  --   --   --   --  40.8 37.7* 38.2*   MCV  --   --   --   --  90 87 88   PLT  --   --   --   --  266 244 220    < > = values in this interval not displayed.     Recent Labs   Lab Test 09/20/22  0757 09/19/22  1030 02/08/22  0945   CR 0.71 0.64* 0.63*     Recent Labs   Lab Test 09/12/22  1557   SED 72*     Microbiology  9/12 R hip   Hip, Right; Synovial fluid            0 Result Notes     Culture 3+ Staphylococcus aureus Abnormal               Resulting Agency: [facility]         Susceptibility              Staphylococcus aureus       YAMILET       Clindamycin <=0.25 ug/mL Susceptible       Erythromycin <=0.25 ug/mL Susceptible       Gentamicin <=0.5 ug/mL Susceptible       Oxacillin 0.5 ug/mL Susceptible 1       Tetracycline <=1 ug/mL Susceptible       Trimethoprim/Sulfamethoxazole <=0.5/9.5 u... Susceptible       Vancomycin <=0.5 ug/mL Susceptible

## 2022-09-22 NOTE — PLAN OF CARE
Goal Outcome Evaluation:    Plan of Care Reviewed With: patient      POD #2. A&Ox4, VSS on RA. Temp reached below 100 F. CMS intact, denies n/t. Pain managed with ice and Tylenol. Dressing CDI, no output from wound vac. SBA w/ walker. PICC line patent.

## 2022-09-22 NOTE — PROGRESS NOTES
"ORTHOPEDIC LOWER EXTREMITY PROGRESS NOTE    POD#3  Patient is a 52 year old male who underwent Procedure(s):  RIGHT HIP EXPLANT,  IRRIGATION AND DEBRIDEMENT, PLACEMENT OF ANTIBIOTIC SPACER on 2022. Pain is well controlled. Tolerating medication well, no nausea or vomiting.  Only used oxycodone once since surgery.  PICC is placed, home infusion team coming today for teaching. Had a temperature of 100.2 yesterday but seen by ID and reassured. Discharge instructions reviewed.    Vitals:   Blood pressure (!) 141/81, pulse 81, temperature 98.6  F (37  C), temperature source Oral, resp. rate 16, height 1.93 m (6' 4\"), weight 123.5 kg (272 lb 4.8 oz), SpO2 96 %.  Temp (24hrs), Av.7  F (37.1  C), Min:98.4  F (36.9  C), Max:99  F (37.2  C)      Drains: prevena wound vac with empty canister    EXAM   The patient is awake and alert.  Calves are soft and non-tender.   Sensation is intact.  Dorsiflexion and plantar flexion is intact.  Foot warm with nl cap refill.  The incision is covered with prineo dressing on lateral incision and prevena dressing on medial incision.     Labs:   Recent Labs   Lab Test 22  0711 22  0727 22  0757 21  0753 21   HGB 8.5* 8.8* 10.2*   < > 11.9*   INR  --   --   --   --  1.14    < > = values in this interval not displayed.     CX: S. Aureus MSSA  ASSESSMENT  S/p R hip explant, I&D, placement of antibiotic spacer   Acute Blood Loss Anemia - stable  PLAN  1. DVT prophylaxis: aspirin  2. Weight Bearin% PWB (Partial weight bearing).  3. Anticipated discharge date today. Discharge to home with IV home infusion.  4. Cont Pain Control Tylenol.  Oxycodone available if needed  5. ID following, appreciate assistance. Plan is to continue Cefazolin 2 grams Q8H X 6 weeks. Schedule ID FUP in 4 weeks  6. Acute blood loss anemia - expected after surgery, stable    Deidre Hess PA-C  [facility]        "

## 2022-09-22 NOTE — PROGRESS NOTES
Home Infusion  Aba will be discharging today and going home on IV cefazolin q8.  I met with Aba and his daughter,  Matheus at bedside and instructed in IV administration via SL PICC line with SAS flushing. Provided information about supplies and supply delivery, storage of medication, checking of label, dosing times, plan for SNV and 24/7 availability of [facility] staff. [facility] will follow for home RN.   Aba and Matheus verbalized understanding of information given and good understanding of process.  Stated they feel comfortable with administering abx later tonight.   Dosing schedule: Pt would prefer to dose at 0600, 1400, 2200 at home.   Delivery: Medication and supplies will be delivered to pt's home today by 1500h  Aba is ready for discharge from [facility] perspective.    Alexandra Lane RN  [facility] Liaison  [phone] (M-F 8a-5p)  [phone] Office

## 2022-09-22 NOTE — PROGRESS NOTES
Patient vital signs are at baseline: Yes  Patient able to ambulate as they were prior to admission or with assist devices provided by therapies during their stay:  Yes  Patient MUST void prior to discharge:  Yes  Patient able to tolerate oral intake:  Yes  Pain has adequate pain control using Oral analgesics:  Yes  Does patient have an identified :  Yes  Has goal D/C date and time been discussed with patient:  Yes      Pt A&O X4, up x1 with walker and GB. VSS except BP is slightly high. Pain managed with Tyl. PICC patent. Adequate I&O. CMS intact. New dressing applied to R hip. No output from wound vac. AVS and med reviewed with pt, all questions answered. Pt discharges home at 2 pm with daughter transport.

## 2022-09-22 NOTE — PLAN OF CARE
Physical Therapy Discharge Summary    Reason for therapy discharge:    Discharged to home.    Progress towards therapy goal(s). See goals on Care Plan in [facility] electronic health record for goal details.  Goals met    Therapy recommendation(s):    No further therapy is recommended.  Continue home exercise program.

## 2022-09-22 NOTE — PLAN OF CARE
PT-  Pt discharged home today with assist of family.  No further PT planned at this time. PT goals met.

## 2022-09-26 LAB — BACTERIA SNV CULT: NORMAL

## 2022-09-27 ENCOUNTER — HOME INFUSION (PRE-WILLOW HOME INFUSION) (OUTPATIENT)
Dept: PHARMACY | Facility: CLINIC | Age: 52
End: 2022-09-27

## 2022-09-27 ENCOUNTER — LAB REQUISITION (OUTPATIENT)
Dept: LAB | Facility: CLINIC | Age: 52
End: 2022-09-27
Payer: COMMERCIAL

## 2022-09-27 DIAGNOSIS — R78.81 BACTEREMIA: ICD-10-CM

## 2022-09-27 LAB
AST SERPL W P-5'-P-CCNC: 27 U/L (ref 10–50)
BASOPHILS # BLD AUTO: 0.1 10E3/UL (ref 0–0.2)
BASOPHILS NFR BLD AUTO: 1 %
CREAT SERPL-MCNC: 0.65 MG/DL (ref 0.67–1.17)
CRP SERPL-MCNC: 8.95 MG/L
EOSINOPHIL # BLD AUTO: 0.4 10E3/UL (ref 0–0.7)
EOSINOPHIL NFR BLD AUTO: 4 %
ERYTHROCYTE [DISTWIDTH] IN BLOOD BY AUTOMATED COUNT: 13.7 % (ref 10–15)
GFR SERPL CREATININE-BSD FRML MDRD: >90 ML/MIN/1.73M2
HCT VFR BLD AUTO: 31.1 % (ref 40–53)
HGB BLD-MCNC: 9.7 G/DL (ref 13.3–17.7)
IMM GRANULOCYTES # BLD: 0.1 10E3/UL
IMM GRANULOCYTES NFR BLD: 1 %
LYMPHOCYTES # BLD AUTO: 2.8 10E3/UL (ref 0.8–5.3)
LYMPHOCYTES NFR BLD AUTO: 28 %
MCH RBC QN AUTO: 27.4 PG (ref 26.5–33)
MCHC RBC AUTO-ENTMCNC: 31.2 G/DL (ref 31.5–36.5)
MCV RBC AUTO: 88 FL (ref 78–100)
MONOCYTES # BLD AUTO: 0.8 10E3/UL (ref 0–1.3)
MONOCYTES NFR BLD AUTO: 8 %
NEUTROPHILS # BLD AUTO: 5.9 10E3/UL (ref 1.6–8.3)
NEUTROPHILS NFR BLD AUTO: 58 %
NRBC # BLD AUTO: 0 10E3/UL
NRBC BLD AUTO-RTO: 0 /100
PLATELET # BLD AUTO: 546 10E3/UL (ref 150–450)
RBC # BLD AUTO: 3.54 10E6/UL (ref 4.4–5.9)
WBC # BLD AUTO: 9.9 10E3/UL (ref 4–11)

## 2022-09-27 PROCEDURE — 86140 C-REACTIVE PROTEIN: CPT | Performed by: SPECIALIST

## 2022-09-27 PROCEDURE — 84450 TRANSFERASE (AST) (SGOT): CPT | Performed by: SPECIALIST

## 2022-09-27 PROCEDURE — 85025 COMPLETE CBC W/AUTO DIFF WBC: CPT | Performed by: SPECIALIST

## 2022-09-27 PROCEDURE — 82565 ASSAY OF CREATININE: CPT | Performed by: SPECIALIST

## 2022-09-27 NOTE — PROGRESS NOTES
This is a recent snapshot of the patient's [facility] medical record.  For current drug dose and complete information and questions, call [phone] or In Basket pool, fv home infusion ([id number])  CSN Number:  [id number]

## 2022-09-28 ENCOUNTER — HOME INFUSION (PRE-WILLOW HOME INFUSION) (OUTPATIENT)
Dept: PHARMACY | Facility: CLINIC | Age: 52
End: 2022-09-28

## 2022-09-28 NOTE — DISCHARGE SUMMARY
"Discharge Summary    Aba Gan MRN# [id number]   YOB: 1970 Age: 52 year old     Date of Admission: 9/19/2022    Date of Discharge: 9/22/2022    Reason for Admission: Aba Gan is an 52 year old male who was admitted to the hospital following surgery.    Preoperative Diagnosis: Infection associated with internal right hip prosthesis, initial encounter (H) [T84.51XA]    Postoperative Diagnosis: Infection associated with internal right hip prosthesis, initial encounter (H) [T84.51XA]    Procedure Completed:  Right hip explant, irrigagion and debridement, synovectomy, and placement of antibiotic spacer    Hospital Course:  Mr. Gan was admitted and underwent the above procedure. The patient tolerated the procedure well. There were no complications. Following surgery he was admitted to the floor. He was seen by ID and a PICC line was placed on 9/20. Cultures grew S. Aureus MSSA, and ID recommended Cefazolin 2 grams Q8Hrs x 6 weeks for treatment. During his stay he did not require any blood transfusions. His pain was controlled with oral pain medication. During his stay he progressed well in physical therapy and all the therapy goals were met.     Discharge Physical Exam:  BP (!) 141/81 (BP Location: Right arm)   Pulse 81   Temp 98.6  F (37  C) (Oral)   Resp 16   Ht 1.93 m (6' 4\")   Wt 123.5 kg (272 lb 4.8 oz)   SpO2 96%   BMI 33.15 kg/m    Neurovascularly intact, distal pulses present bilaterally.  Calves are negative bilaterally, both soft and nontender.    Assessment: Right hip explant, irrigagion and debridement, synovectomy, and placement of antibiotic spacer    Plan: We will discharge him home on analgesics, deep venous thrombosis prophylaxis, and IV Cefazolin 2 grams Q8hrs x 6 weeks. He will remain 50% WB on the RLE with a walker. He will follow-up with me approximately 2 weeks from surgery.  He may call [phone] to schedule an appointment.    Meds:     Medication List      Started  "   ceFAZolin-dextrose 2-4 GM/100ML-% Soln infusion  Commonly known as: ANCEF  2 g, Intravenous, EVERY 8 HOURS     oxyCODONE 5 MG tablet  Commonly known as: ROXICODONE  5-10 mg, Oral, EVERY 4 HOURS PRN     senna-docusate 8.6-50 MG tablet  Commonly known as: SENOKOT-S/PERICOLACE  1-2 tablets, Oral, 2 TIMES DAILY, Take while on oral narcotics to prevent or treat constipation.        Modified    acetaminophen 325 MG tablet  Commonly known as: TYLENOL  975 mg, Oral, EVERY 6 HOURS PRN  What changed:     how much to take    when to take this    reasons to take this     celecoxib 200 MG capsule  Commonly known as: celeBREX  200 mg, Oral, DAILY, Do not take within 6 hours of ibuprofen (MOTRIN, ADVIL) or ketorolac (TORADOL) if prescribed.  What changed:     when to take this    additional instructions

## 2022-09-29 NOTE — PROGRESS NOTES
This is a recent snapshot of the patient's [facility] medical record.  For current drug dose and complete information and questions, call [phone] or In Basket pool, fv home infusion ([id number])  CSN Number:[id number]

## 2022-10-03 ENCOUNTER — HOME INFUSION (PRE-WILLOW HOME INFUSION) (OUTPATIENT)
Dept: PHARMACY | Facility: CLINIC | Age: 52
End: 2022-10-03

## 2022-10-03 ENCOUNTER — HEALTH MAINTENANCE LETTER (OUTPATIENT)
Age: 52
End: 2022-10-03

## 2022-10-03 ENCOUNTER — LAB REQUISITION (OUTPATIENT)
Dept: LAB | Facility: CLINIC | Age: 52
End: 2022-10-03
Payer: COMMERCIAL

## 2022-10-03 DIAGNOSIS — R78.81 BACTEREMIA: ICD-10-CM

## 2022-10-03 LAB
AST SERPL W P-5'-P-CCNC: 22 U/L (ref 10–50)
BASOPHILS # BLD AUTO: 0.1 10E3/UL (ref 0–0.2)
BASOPHILS NFR BLD AUTO: 1 %
CREAT SERPL-MCNC: 0.66 MG/DL (ref 0.67–1.17)
CRP SERPL-MCNC: <3 MG/L
EOSINOPHIL # BLD AUTO: 0.2 10E3/UL (ref 0–0.7)
EOSINOPHIL NFR BLD AUTO: 3 %
ERYTHROCYTE [DISTWIDTH] IN BLOOD BY AUTOMATED COUNT: 13.9 % (ref 10–15)
GFR SERPL CREATININE-BSD FRML MDRD: >90 ML/MIN/1.73M2
HCT VFR BLD AUTO: 32.1 % (ref 40–53)
HGB BLD-MCNC: 9.9 G/DL (ref 13.3–17.7)
IMM GRANULOCYTES # BLD: 0 10E3/UL
IMM GRANULOCYTES NFR BLD: 0 %
LYMPHOCYTES # BLD AUTO: 2 10E3/UL (ref 0.8–5.3)
LYMPHOCYTES NFR BLD AUTO: 27 %
MCH RBC QN AUTO: 26.8 PG (ref 26.5–33)
MCHC RBC AUTO-ENTMCNC: 30.8 G/DL (ref 31.5–36.5)
MCV RBC AUTO: 87 FL (ref 78–100)
MONOCYTES # BLD AUTO: 0.5 10E3/UL (ref 0–1.3)
MONOCYTES NFR BLD AUTO: 7 %
NEUTROPHILS # BLD AUTO: 4.6 10E3/UL (ref 1.6–8.3)
NEUTROPHILS NFR BLD AUTO: 62 %
NRBC # BLD AUTO: 0 10E3/UL
NRBC BLD AUTO-RTO: 0 /100
PLATELET # BLD AUTO: 460 10E3/UL (ref 150–450)
RBC # BLD AUTO: 3.7 10E6/UL (ref 4.4–5.9)
WBC # BLD AUTO: 7.3 10E3/UL (ref 4–11)

## 2022-10-03 PROCEDURE — 84450 TRANSFERASE (AST) (SGOT): CPT | Performed by: SPECIALIST

## 2022-10-03 PROCEDURE — 82565 ASSAY OF CREATININE: CPT | Performed by: SPECIALIST

## 2022-10-03 PROCEDURE — 86140 C-REACTIVE PROTEIN: CPT | Performed by: SPECIALIST

## 2022-10-03 PROCEDURE — 85025 COMPLETE CBC W/AUTO DIFF WBC: CPT | Performed by: SPECIALIST

## 2022-10-03 NOTE — PROGRESS NOTES
This is a recent snapshot of the patient's [facility] medical record.  For current drug dose and complete information and questions, call [phone] or In Basket pool, fv home infusion ([id number])  CSN Number:  [id number]

## 2022-10-10 ENCOUNTER — LAB REQUISITION (OUTPATIENT)
Dept: LAB | Facility: CLINIC | Age: 52
End: 2022-10-10
Payer: COMMERCIAL

## 2022-10-10 DIAGNOSIS — R78.81 BACTEREMIA: ICD-10-CM

## 2022-10-10 LAB
AST SERPL W P-5'-P-CCNC: 25 U/L (ref 10–50)
BASOPHILS # BLD AUTO: 0 10E3/UL (ref 0–0.2)
BASOPHILS NFR BLD AUTO: 1 %
CREAT SERPL-MCNC: 0.63 MG/DL (ref 0.67–1.17)
CRP SERPL-MCNC: <3 MG/L
EOSINOPHIL # BLD AUTO: 0.3 10E3/UL (ref 0–0.7)
EOSINOPHIL NFR BLD AUTO: 5 %
ERYTHROCYTE [DISTWIDTH] IN BLOOD BY AUTOMATED COUNT: 13.4 % (ref 10–15)
GFR SERPL CREATININE-BSD FRML MDRD: >90 ML/MIN/1.73M2
HCT VFR BLD AUTO: 34.2 % (ref 40–53)
HGB BLD-MCNC: 10.4 G/DL (ref 13.3–17.7)
IMM GRANULOCYTES # BLD: 0 10E3/UL
IMM GRANULOCYTES NFR BLD: 0 %
LYMPHOCYTES # BLD AUTO: 2 10E3/UL (ref 0.8–5.3)
LYMPHOCYTES NFR BLD AUTO: 33 %
MCH RBC QN AUTO: 26.7 PG (ref 26.5–33)
MCHC RBC AUTO-ENTMCNC: 30.4 G/DL (ref 31.5–36.5)
MCV RBC AUTO: 88 FL (ref 78–100)
MONOCYTES # BLD AUTO: 0.5 10E3/UL (ref 0–1.3)
MONOCYTES NFR BLD AUTO: 8 %
NEUTROPHILS # BLD AUTO: 3.1 10E3/UL (ref 1.6–8.3)
NEUTROPHILS NFR BLD AUTO: 53 %
NRBC # BLD AUTO: 0 10E3/UL
NRBC BLD AUTO-RTO: 0 /100
PLATELET # BLD AUTO: 353 10E3/UL (ref 150–450)
RBC # BLD AUTO: 3.9 10E6/UL (ref 4.4–5.9)
WBC # BLD AUTO: 5.9 10E3/UL (ref 4–11)

## 2022-10-10 PROCEDURE — 86140 C-REACTIVE PROTEIN: CPT | Performed by: SPECIALIST

## 2022-10-10 PROCEDURE — 84450 TRANSFERASE (AST) (SGOT): CPT | Performed by: SPECIALIST

## 2022-10-10 PROCEDURE — 85025 COMPLETE CBC W/AUTO DIFF WBC: CPT | Performed by: SPECIALIST

## 2022-10-10 PROCEDURE — 82565 ASSAY OF CREATININE: CPT | Performed by: SPECIALIST

## 2022-10-17 ENCOUNTER — LAB REQUISITION (OUTPATIENT)
Dept: LAB | Facility: CLINIC | Age: 52
End: 2022-10-17
Payer: COMMERCIAL

## 2022-10-17 DIAGNOSIS — R78.81 BACTEREMIA: ICD-10-CM

## 2022-10-17 LAB
AST SERPL W P-5'-P-CCNC: 22 U/L (ref 10–50)
BASOPHILS # BLD AUTO: 0 10E3/UL (ref 0–0.2)
BASOPHILS NFR BLD AUTO: 1 %
CREAT SERPL-MCNC: 0.72 MG/DL (ref 0.67–1.17)
CRP SERPL-MCNC: <3 MG/L
EOSINOPHIL # BLD AUTO: 0.3 10E3/UL (ref 0–0.7)
EOSINOPHIL NFR BLD AUTO: 6 %
ERYTHROCYTE [DISTWIDTH] IN BLOOD BY AUTOMATED COUNT: 13.4 % (ref 10–15)
GFR SERPL CREATININE-BSD FRML MDRD: >90 ML/MIN/1.73M2
HCT VFR BLD AUTO: 36 % (ref 40–53)
HGB BLD-MCNC: 11 G/DL (ref 13.3–17.7)
IMM GRANULOCYTES # BLD: 0 10E3/UL
IMM GRANULOCYTES NFR BLD: 0 %
LYMPHOCYTES # BLD AUTO: 1.7 10E3/UL (ref 0.8–5.3)
LYMPHOCYTES NFR BLD AUTO: 33 %
MCH RBC QN AUTO: 26.8 PG (ref 26.5–33)
MCHC RBC AUTO-ENTMCNC: 30.6 G/DL (ref 31.5–36.5)
MCV RBC AUTO: 88 FL (ref 78–100)
MONOCYTES # BLD AUTO: 0.6 10E3/UL (ref 0–1.3)
MONOCYTES NFR BLD AUTO: 11 %
NEUTROPHILS # BLD AUTO: 2.5 10E3/UL (ref 1.6–8.3)
NEUTROPHILS NFR BLD AUTO: 49 %
NRBC # BLD AUTO: 0 10E3/UL
NRBC BLD AUTO-RTO: 0 /100
PLATELET # BLD AUTO: 230 10E3/UL (ref 150–450)
RBC # BLD AUTO: 4.11 10E6/UL (ref 4.4–5.9)
WBC # BLD AUTO: 5.1 10E3/UL (ref 4–11)

## 2022-10-17 PROCEDURE — 86140 C-REACTIVE PROTEIN: CPT | Performed by: SPECIALIST

## 2022-10-17 PROCEDURE — 82565 ASSAY OF CREATININE: CPT | Performed by: SPECIALIST

## 2022-10-17 PROCEDURE — 84450 TRANSFERASE (AST) (SGOT): CPT | Performed by: SPECIALIST

## 2022-10-17 PROCEDURE — 85025 COMPLETE CBC W/AUTO DIFF WBC: CPT | Performed by: SPECIALIST

## 2022-10-24 ENCOUNTER — LAB REQUISITION (OUTPATIENT)
Dept: LAB | Facility: CLINIC | Age: 52
End: 2022-10-24
Payer: COMMERCIAL

## 2022-10-24 DIAGNOSIS — R78.81 BACTEREMIA: ICD-10-CM

## 2022-10-24 LAB
AST SERPL W P-5'-P-CCNC: 21 U/L (ref 10–50)
BASOPHILS # BLD AUTO: 0.1 10E3/UL (ref 0–0.2)
BASOPHILS NFR BLD AUTO: 1 %
CREAT SERPL-MCNC: 0.71 MG/DL (ref 0.67–1.17)
CRP SERPL-MCNC: <3 MG/L
EOSINOPHIL # BLD AUTO: 0.3 10E3/UL (ref 0–0.7)
EOSINOPHIL NFR BLD AUTO: 5 %
ERYTHROCYTE [DISTWIDTH] IN BLOOD BY AUTOMATED COUNT: 13.8 % (ref 10–15)
GFR SERPL CREATININE-BSD FRML MDRD: >90 ML/MIN/1.73M2
HCT VFR BLD AUTO: 36.1 % (ref 40–53)
HGB BLD-MCNC: 11.1 G/DL (ref 13.3–17.7)
IMM GRANULOCYTES # BLD: 0 10E3/UL
IMM GRANULOCYTES NFR BLD: 0 %
LYMPHOCYTES # BLD AUTO: 1.7 10E3/UL (ref 0.8–5.3)
LYMPHOCYTES NFR BLD AUTO: 34 %
MCH RBC QN AUTO: 26.6 PG (ref 26.5–33)
MCHC RBC AUTO-ENTMCNC: 30.7 G/DL (ref 31.5–36.5)
MCV RBC AUTO: 86 FL (ref 78–100)
MONOCYTES # BLD AUTO: 0.5 10E3/UL (ref 0–1.3)
MONOCYTES NFR BLD AUTO: 9 %
NEUTROPHILS # BLD AUTO: 2.6 10E3/UL (ref 1.6–8.3)
NEUTROPHILS NFR BLD AUTO: 51 %
NRBC # BLD AUTO: 0 10E3/UL
NRBC BLD AUTO-RTO: 0 /100
PLATELET # BLD AUTO: 246 10E3/UL (ref 150–450)
RBC # BLD AUTO: 4.18 10E6/UL (ref 4.4–5.9)
WBC # BLD AUTO: 5.1 10E3/UL (ref 4–11)

## 2022-10-24 PROCEDURE — 82565 ASSAY OF CREATININE: CPT | Performed by: SPECIALIST

## 2022-10-24 PROCEDURE — 86140 C-REACTIVE PROTEIN: CPT | Performed by: SPECIALIST

## 2022-10-24 PROCEDURE — 84450 TRANSFERASE (AST) (SGOT): CPT | Performed by: SPECIALIST

## 2022-10-24 PROCEDURE — 85025 COMPLETE CBC W/AUTO DIFF WBC: CPT | Performed by: SPECIALIST

## 2022-10-24 NOTE — PROGRESS NOTES
This is a recent snapshot of the patient's [facility] medical record.  For current drug dose and complete information and questions, call [phone] or In Basket pool, fv home infusion ([id number])  CSN Number:  [id number]

## 2022-10-28 NOTE — OP NOTE
Procedure Date: 09/19/2022    PREOPERATIVE DIAGNOSIS:  Infected right total hip arthroplasty.    POSTOPERATIVE DIAGNOSIS:  Infected right total hip arthroplasty.    PROCEDURE PERFORMED:  Explantation of right hip with placement of a Prostalac temporary hip spacer and antibiotic-impregnated cement.    ATTENDING SURGEON:  Eric Wang MD    CO-SURGEON:  Deborah Tim MD    Assist:   MITESH Hanna    DESCRIPTION OF PROCEDURE:  The patient was brought to the operating room.  After satisfactory anesthesia, the patient was placed in lateral decubitus position.  Right lower extremity was prepped and draped in the usual sterile fashion.  The patient received standard antibiotic dosing preoperatively.  The incision was made over the anterior aspect of the thigh where there was an abscess formation.  This was dissected down to the area of the abscess.  This was thoroughly irrigated and removed of any necrotic material.  This was then packed.  A lateral incision was then made over the greater trochanter.  Dissection was carried down to the fascia.  The fascia was split longitudinally.  Abductors were identified.  The anterior one-third of the abductors was reflected in standard fashion.  Dissection was carried down to the hip capsule.  A capsulectomy was performed.  It was clear that there was a deep infection with purulence down to the hip joint.  There was a tract that extended up into the area of the abscess formation in the thigh.  The hip was dislocated.  Extensive synovectomy was performed and debridement of all necrotic material.  The head was removed.  The femoral stem was carefully exposed.  There was no evidence of loosening of the stem.  The stem was clearly adhered and healed into the bone.  The osteotomes were carefully placed, anterior posterior as well as medial lateral, and the stem, with some difficulty but without bone loss, was removed.  The canal was thoroughly irrigated and debrided of any  necrotic tissue as well.     Attention was then directed to the acetabulum.  The polyethylene was removed.  The cup was then exposed and with the extraction device for the cup, it was undermined and the cup was then removed.  Again, cup was firmly adhered, but removed without significant bone loss.    There was purulence seen back behind the cup.  The acetabulum was curetted and all necrotic debris was removed.  Following this, the acetabulum was then prepared for the prosthetic acetabular component.  Multiple doses of antibiotic cement with antibiotic-impregnated cement was utilized.  The Prostalac, or the acetabular component, was cemented in place with the cement quite doughy it to aid in its removal later.  Attention was then directed back to the femur.  Broaches were placed and a broach was placed up to a size 1 stem.  This gave excellent torsional as well as axial stability.  The stem was then prepared with the antibiotic cement mold.  The stem was then impacted into place and following this, the plus 13 mm head was impacted on the top of the stem and then the hip was reduced into the acetabulum. Through a full range of motion, there was excellent tension and there was no evidence of instability.     The abductors were then closed with interrupted absorbable suture and fascia was then closed with a Stratafix suture.  The subcutaneous layer was closed with a monofilament as well as a Monocryl suture.  Sterile skin incision was closed.  Sterile dressing was applied.  The previous abscess incision had been thoroughly irrigated again and debrided of all necrotic tissue and it was closed with interrupted nylon suture.  A Prevena dressing was then applied.  The patient left the operating room in satisfactory condition.      Eric Almaraz MD        D: 10/28/2022   T: 10/28/2022   MT: [id number]    Name:     CORONA PETERS  MRN:      [id number]        Account:        [id number]   :      1970            Procedure Date: 09/19/2022     Document: [id number]    cc:  Eric Almaraz MD

## 2022-11-08 ENCOUNTER — MEDICAL CORRESPONDENCE (OUTPATIENT)
Dept: HEALTH INFORMATION MANAGEMENT | Facility: CLINIC | Age: 52
End: 2022-11-08

## 2022-11-08 DIAGNOSIS — T84.51XA INFECTION OF RIGHT PROSTHETIC HIP JOINT (H): Primary | ICD-10-CM

## 2022-11-08 NOTE — OP NOTE
Procedure Date: 09/22/2022    PREOPERATIVE DIAGNOSIS:  Infected right total hip arthroplasty.    POSTOPERATIVE DIAGNOSIS:  Infected right total hip arthroplasty.    PROCEDURE PERFORMED:  Removal of right total hip with removal of the stem and the acetabulum and placement of temporary antibiotic spacer.    INDICATIONS FOR PROCEDURE:  The patient is a 54-year-old male patient of Dr. Wang's who had a successful right total hip arthroplasty.  Unfortunately, it became infected and needed to be removed.  Because of the complex nature of this revision type surgery, Dr. Wang asked for assistance.    PRIMARY SURGEON:  Eric Wang MD    SECONDARY SURGEON:  Deborah Tim MD    SECOND ASSISTANT:  Medardo Ulloa PA-C.    DESCRIPTION OF PROCEDURE:  The patient was taken to the operating room and placed on the operating table in supine position.  After adequate induction of anesthesia, he was placed in lateral decubitus position with the right hip up.  The patient was given 2 grams of Ancef for infection prophylaxis and he was prepped and draped in normal sterile fashion.  Incision was made over the anterior aspect of the thigh where an abscess had formed.  We sharply dissected down onto the tensor fascia.  We then proceeded with an anterolateral approach to the hip.  The anterior one-third of the abductors were reflected medially.  We then sharply dissected down into the hip joint.  We did a complete capsulectomy and synovectomy.  There was obvious purulent material within the hip joint.  The proximal femur was exposed and the stem was found to be well fixed.  We then carefully with a series of osteotomes, were able to remove the femoral component without damage to the femoral canal or the proximal femur.  We then exposed the acetabulum and we used the Barb extraction osteotomes to remove the acetabular component.  Once this was done, we then prepared the femoral canal and the acetabulum for a prosthetic acetabular  component.  We used antibiotic impregnated cement and the Prostalac was made on the back table.  We then had to prepare the femoral canal for the Prostalac stem.  We broached up a size 1 stem.  It was found to be stable.  The stem was then placed.  We then soaked the hip in a dilute solution of Betadine for 3 minutes and irrigated with pulse jet lavage, used approximately 3 liters of antibiotic saline and pulse jet lavage.  We then repaired the abductors using #2 Maxon suture.  We then repaired the tensor fascia using 0 Prolene.  The subQ was closed using 2-0 Prolene, the skin was closed with a running 3-0 Monocryl suture.  The Prevena dressing was then applied.  The patient tolerated the procedure.  There were no intraoperative complications.  The patient sent to [facility] in stable condition.    Deborah Tim MD        D: 2022   T: 2022   MT: CHANDNI    Name:     CORONA PETERS  MRN:      [id number]        Account:        [id number]   :      1970           Procedure Date: 2022     Document: [id number]

## 2022-11-09 ENCOUNTER — HOSPITAL ENCOUNTER (OUTPATIENT)
Dept: GENERAL RADIOLOGY | Facility: CLINIC | Age: 52
Discharge: HOME | End: 2022-11-09
Attending: ORTHOPAEDIC SURGERY
Payer: COMMERCIAL

## 2022-11-09 ENCOUNTER — LAB (OUTPATIENT)
Dept: LAB | Facility: CLINIC | Age: 52
End: 2022-11-09
Attending: ORTHOPAEDIC SURGERY
Payer: COMMERCIAL

## 2022-11-09 VITALS — SYSTOLIC BLOOD PRESSURE: 125 MMHG | DIASTOLIC BLOOD PRESSURE: 84 MMHG | OXYGEN SATURATION: 96 % | HEART RATE: 79 BPM

## 2022-11-09 DIAGNOSIS — M00.9 SEPTIC ARTHRITIS (H): ICD-10-CM

## 2022-11-09 DIAGNOSIS — T84.51XA INFECTION OF RIGHT PROSTHETIC HIP JOINT (H): ICD-10-CM

## 2022-11-09 LAB
% LINING CELLS, BODY FLUID: 4 %
APPEARANCE FLD: ABNORMAL
CELL COUNT BODY FLUID SOURCE: ABNORMAL
COLOR FLD: YELLOW
CRP SERPL-MCNC: <2.9 MG/L (ref 0–8)
ERYTHROCYTE [SEDIMENTATION RATE] IN BLOOD BY WESTERGREN METHOD: 19 MM/HR (ref 0–20)
LYMPHOCYTES NFR FLD MANUAL: 49 %
MONOS+MACROS NFR FLD MANUAL: 26 %
NEUTS BAND NFR FLD MANUAL: 21 %
WBC # FLD AUTO: 155 /UL

## 2022-11-09 PROCEDURE — 89051 BODY FLUID CELL COUNT: CPT | Performed by: ORTHOPAEDIC SURGERY

## 2022-11-09 PROCEDURE — 86140 C-REACTIVE PROTEIN: CPT

## 2022-11-09 PROCEDURE — 87070 CULTURE OTHR SPECIMN AEROBIC: CPT | Performed by: ORTHOPAEDIC SURGERY

## 2022-11-09 PROCEDURE — 77002 NEEDLE LOCALIZATION BY XRAY: CPT

## 2022-11-09 PROCEDURE — 36415 COLL VENOUS BLD VENIPUNCTURE: CPT

## 2022-11-09 PROCEDURE — 87075 CULTR BACTERIA EXCEPT BLOOD: CPT | Performed by: ORTHOPAEDIC SURGERY

## 2022-11-09 PROCEDURE — 85652 RBC SED RATE AUTOMATED: CPT

## 2022-11-09 PROCEDURE — 250N000009 HC RX 250: Performed by: ORTHOPAEDIC SURGERY

## 2022-11-09 PROCEDURE — 83518 IMMUNOASSAY DIPSTICK: CPT | Performed by: ORTHOPAEDIC SURGERY

## 2022-11-09 PROCEDURE — 87205 SMEAR GRAM STAIN: CPT | Performed by: ORTHOPAEDIC SURGERY

## 2022-11-09 RX ORDER — LIDOCAINE HYDROCHLORIDE 10 MG/ML
30 INJECTION, SOLUTION EPIDURAL; INFILTRATION; INTRACAUDAL; PERINEURAL ONCE
Status: COMPLETED | OUTPATIENT
Start: 2022-11-09 | End: 2022-11-09

## 2022-11-09 RX ADMIN — LIDOCAINE HYDROCHLORIDE 1.5 ML: 10 INJECTION, SOLUTION EPIDURAL; INFILTRATION; INTRACAUDAL; PERINEURAL at 10:00

## 2022-11-09 NOTE — DISCHARGE INSTRUCTIONS
JOINT ASPIRATION DISCHARGE INSTRUCTIONS    What to expect  After the procedure, you may feel some temporary relief from the local anesthetic, but that will likely wear off within a few hours. Your symptoms may then return to pre-procedure level, along with soreness at the puncture site for a day or two.  Lab results should be available to your referring provider within 2-5 days.   What to do  Minimize your activity today. You may resume your normal activity tomorrow as tolerated. Avoid vigorous or strenuous activity until your symptoms improve, or as directed by your referring provider.   You may shower tomorrow, but do not submerge in bathtub, hot tub or pool for 48 hours.    Use ice packs as needed for discomfort.  You may resume all medications, including blood thinners.  You may take over the counter acetaminophen (Tylenol) or ibuprofen (Motrin, Advil) for mild discomfort after the procedure.    What to watch for  It can be normal be have some bruising or slight swelling at the puncture site. However, if you begin to develop any redness or marked swelling at the puncture site, fever, notable increase in pain, weakness, or numbness, please contact your referring provider.  If you have questions or concerns  You may contact the [facility] Radiology Department at [phone] between 8am-4:30pm Monday through Friday.  If you have urgent questions outside of these normal business hours, please contact the [facility] on-call physician at [phone].    The provider who performed your procedure was  Dr Piña .

## 2022-11-10 LAB
ALPHA DEFENSINS 1+2+3 SNV QL IA: NEGATIVE
PRELIM TEST AFFECT FURTHER TEST SPEC: YES
SPECIMEN SOURCE SNV: NORMAL

## 2022-11-14 LAB
BACTERIA SNV CULT: NO GROWTH
GRAM STAIN RESULT: NORMAL
GRAM STAIN RESULT: NORMAL

## 2022-11-23 LAB — BACTERIA SNV CULT: NORMAL

## 2022-11-28 RX ORDER — ASPIRIN 81 MG/1
81 TABLET ORAL DAILY
Status: ON HOLD | COMMUNITY
End: 2022-12-01

## 2022-11-28 RX ORDER — AMOXICILLIN 500 MG/1
2000 CAPSULE ORAL
COMMUNITY

## 2022-11-29 ENCOUNTER — ANESTHESIA EVENT (OUTPATIENT)
Dept: SURGERY | Facility: CLINIC | Age: 52
End: 2022-11-29
Payer: COMMERCIAL

## 2022-11-29 NOTE — PROGRESS NOTES
PTA medications updated by Medication Scribe prior to surgery via phone call with patient (last doses completed by Nurse)     Medication history sources: Patient, Surescripts, H&P and Patient's home med list  In the past week, patient estimated taking medication this percent of the time: Greater than 90%  Adherence assessment: N/A Not Observed    Significant changes made to the medication list:  None      Additional medication history information:   None    Medication reconciliation completed by provider prior to medication history? No    Time spent in this activity: 25 MINUTES    The information provided in this note is only as accurate as the sources available at the time of update(s)      Prior to Admission medications    Medication Sig Last Dose Taking? Auth Provider Long Term End Date   acetaminophen (TYLENOL) 325 MG tablet Take 3 tablets (975 mg) by mouth every 6 hours as needed for other (mild pain)  at PRN Yes Deidre Hess PA-C     amoxicillin (AMOXIL) 500 MG capsule Take 2,000 mg by mouth once as needed (1 HOUR PRIOR TO DENTAL VISIT) (500MG X 4 = 2,000MG)  at PRN Yes Reported, Patient     aspirin 81 MG EC tablet Take 81 mg by mouth daily  at AM Yes Reported, Patient     atorvastatin (LIPITOR) 40 MG tablet Take 40 mg by mouth daily  at PM Yes Unknown, Entered By History Yes    fish oil-omega-3 fatty acids 1000 MG capsule Take 2,000 mg by mouth daily (1G X 2 = 2G)  at AM Yes Unknown, Entered By History

## 2022-11-30 ENCOUNTER — APPOINTMENT (OUTPATIENT)
Dept: GENERAL RADIOLOGY | Facility: CLINIC | Age: 52
End: 2022-11-30
Attending: ORTHOPAEDIC SURGERY
Payer: COMMERCIAL

## 2022-11-30 ENCOUNTER — ANESTHESIA (OUTPATIENT)
Dept: SURGERY | Facility: CLINIC | Age: 52
End: 2022-11-30
Payer: COMMERCIAL

## 2022-11-30 ENCOUNTER — APPOINTMENT (OUTPATIENT)
Dept: PHYSICAL THERAPY | Facility: CLINIC | Age: 52
End: 2022-11-30
Attending: ORTHOPAEDIC SURGERY
Payer: COMMERCIAL

## 2022-11-30 ENCOUNTER — HOSPITAL ENCOUNTER (INPATIENT)
Facility: CLINIC | Age: 52
LOS: 2 days | Discharge: HOME | End: 2022-12-02
Attending: ORTHOPAEDIC SURGERY | Admitting: ORTHOPAEDIC SURGERY
Payer: COMMERCIAL

## 2022-11-30 ENCOUNTER — MEDICAL CORRESPONDENCE (OUTPATIENT)
Dept: HEALTH INFORMATION MANAGEMENT | Facility: CLINIC | Age: 52
End: 2022-11-30

## 2022-11-30 DIAGNOSIS — Z96.649 STATUS POST REVISION OF TOTAL HIP REPLACEMENT: Primary | ICD-10-CM

## 2022-11-30 LAB
ABO/RH(D): NORMAL
ANTIBODY SCREEN: NEGATIVE
CREAT SERPL-MCNC: 0.68 MG/DL (ref 0.66–1.25)
FASTING STATUS PATIENT QL REPORTED: YES
GFR SERPL CREATININE-BSD FRML MDRD: >90 ML/MIN/1.73M2
GLUCOSE BLD-MCNC: 119 MG/DL (ref 70–99)
HGB BLD-MCNC: 12.4 G/DL (ref 13.3–17.7)
SPECIMEN EXPIRATION DATE: NORMAL

## 2022-11-30 PROCEDURE — 999N000063 XR PELVIS PORT 1/2 VIEWS

## 2022-11-30 PROCEDURE — 0SR904A REPLACEMENT OF RIGHT HIP JOINT WITH CERAMIC ON POLYETHYLENE SYNTHETIC SUBSTITUTE, UNCEMENTED, OPEN APPROACH: ICD-10-PCS | Performed by: ORTHOPAEDIC SURGERY

## 2022-11-30 PROCEDURE — 999N000063 XR PELVIS AND HIP PORTABLE RIGHT 1 VIEW

## 2022-11-30 PROCEDURE — 258N000003 HC RX IP 258 OP 636: Performed by: ORTHOPAEDIC SURGERY

## 2022-11-30 PROCEDURE — 0QU40KZ SUPPLEMENT RIGHT ACETABULUM WITH NONAUTOLOGOUS TISSUE SUBSTITUTE, OPEN APPROACH: ICD-10-PCS | Performed by: ORTHOPAEDIC SURGERY

## 2022-11-30 PROCEDURE — 36415 COLL VENOUS BLD VENIPUNCTURE: CPT | Performed by: ANESTHESIOLOGY

## 2022-11-30 PROCEDURE — 258N000003 HC RX IP 258 OP 636: Performed by: NURSE ANESTHETIST, CERTIFIED REGISTERED

## 2022-11-30 PROCEDURE — 82947 ASSAY GLUCOSE BLOOD QUANT: CPT | Performed by: ANESTHESIOLOGY

## 2022-11-30 PROCEDURE — 258N000003 HC RX IP 258 OP 636: Performed by: PHYSICIAN ASSISTANT

## 2022-11-30 PROCEDURE — C1776 JOINT DEVICE (IMPLANTABLE): HCPCS | Performed by: ORTHOPAEDIC SURGERY

## 2022-11-30 PROCEDURE — 250N000009 HC RX 250: Performed by: STUDENT IN AN ORGANIZED HEALTH CARE EDUCATION/TRAINING PROGRAM

## 2022-11-30 PROCEDURE — C1713 ANCHOR/SCREW BN/BN,TIS/BN: HCPCS | Performed by: ORTHOPAEDIC SURGERY

## 2022-11-30 PROCEDURE — 250N000013 HC RX MED GY IP 250 OP 250 PS 637: Performed by: PHYSICIAN ASSISTANT

## 2022-11-30 PROCEDURE — C1762 CONN TISS, HUMAN(INC FASCIA): HCPCS | Performed by: ORTHOPAEDIC SURGERY

## 2022-11-30 PROCEDURE — 710N000009 HC RECOVERY PHASE 1, LEVEL 1, PER MIN: Performed by: ORTHOPAEDIC SURGERY

## 2022-11-30 PROCEDURE — 360N000078 HC SURGERY LEVEL 5, PER MIN: Performed by: ORTHOPAEDIC SURGERY

## 2022-11-30 PROCEDURE — 250N000011 HC RX IP 250 OP 636: Performed by: PHYSICIAN ASSISTANT

## 2022-11-30 PROCEDURE — 250N000009 HC RX 250: Performed by: ORTHOPAEDIC SURGERY

## 2022-11-30 PROCEDURE — 999N000141 HC STATISTIC PRE-PROCEDURE NURSING ASSESSMENT: Performed by: ORTHOPAEDIC SURGERY

## 2022-11-30 PROCEDURE — 87075 CULTR BACTERIA EXCEPT BLOOD: CPT | Performed by: ORTHOPAEDIC SURGERY

## 2022-11-30 PROCEDURE — 87070 CULTURE OTHR SPECIMN AEROBIC: CPT | Performed by: ORTHOPAEDIC SURGERY

## 2022-11-30 PROCEDURE — 272N000001 HC OR GENERAL SUPPLY STERILE: Performed by: ORTHOPAEDIC SURGERY

## 2022-11-30 PROCEDURE — 120N000001 HC R&B MED SURG/OB

## 2022-11-30 PROCEDURE — 258N000003 HC RX IP 258 OP 636: Performed by: ANESTHESIOLOGY

## 2022-11-30 PROCEDURE — 250N000009 HC RX 250: Performed by: NURSE ANESTHETIST, CERTIFIED REGISTERED

## 2022-11-30 PROCEDURE — 85018 HEMOGLOBIN: CPT | Performed by: ANESTHESIOLOGY

## 2022-11-30 PROCEDURE — 82565 ASSAY OF CREATININE: CPT | Performed by: ANESTHESIOLOGY

## 2022-11-30 PROCEDURE — 0SP90JZ REMOVAL OF SYNTHETIC SUBSTITUTE FROM RIGHT HIP JOINT, OPEN APPROACH: ICD-10-PCS | Performed by: ORTHOPAEDIC SURGERY

## 2022-11-30 PROCEDURE — 97161 PT EVAL LOW COMPLEX 20 MIN: CPT | Mod: GP

## 2022-11-30 PROCEDURE — 250N000025 HC SEVOFLURANE, PER MIN: Performed by: ORTHOPAEDIC SURGERY

## 2022-11-30 PROCEDURE — 250N000013 HC RX MED GY IP 250 OP 250 PS 637: Performed by: ORTHOPAEDIC SURGERY

## 2022-11-30 PROCEDURE — 370N000017 HC ANESTHESIA TECHNICAL FEE, PER MIN: Performed by: ORTHOPAEDIC SURGERY

## 2022-11-30 PROCEDURE — 97110 THERAPEUTIC EXERCISES: CPT | Mod: GP

## 2022-11-30 PROCEDURE — 250N000013 HC RX MED GY IP 250 OP 250 PS 637: Performed by: STUDENT IN AN ORGANIZED HEALTH CARE EDUCATION/TRAINING PROGRAM

## 2022-11-30 PROCEDURE — 97530 THERAPEUTIC ACTIVITIES: CPT | Mod: GP

## 2022-11-30 PROCEDURE — 86850 RBC ANTIBODY SCREEN: CPT | Performed by: ANESTHESIOLOGY

## 2022-11-30 PROCEDURE — 250N000011 HC RX IP 250 OP 636: Performed by: NURSE ANESTHETIST, CERTIFIED REGISTERED

## 2022-11-30 PROCEDURE — 258N000001 HC RX 258: Performed by: ORTHOPAEDIC SURGERY

## 2022-11-30 PROCEDURE — 86901 BLOOD TYPING SEROLOGIC RH(D): CPT | Performed by: ANESTHESIOLOGY

## 2022-11-30 PROCEDURE — 250N000011 HC RX IP 250 OP 636: Performed by: ORTHOPAEDIC SURGERY

## 2022-11-30 DEVICE — PINNACLE HIP SOLUTIONS ALTRX POLYETHYLENE ACETABULAR LINER +4 NEUTRAL 36MM ID 64MM OD
Type: IMPLANTABLE DEVICE | Site: HIP | Status: FUNCTIONAL
Brand: PINNACLE ALTRX

## 2022-11-30 DEVICE — BIOLOX DELTA CERAMIC FEMORAL HEAD +5.0 36MM DIA 12/14 TAPER
Type: IMPLANTABLE DEVICE | Site: HIP | Status: FUNCTIONAL
Brand: BIOLOX DELTA

## 2022-11-30 DEVICE — PINNACLE CANCELLOUS BONE SCREW 6.5MM X 25MM
Type: IMPLANTABLE DEVICE | Site: HIP | Status: FUNCTIONAL
Brand: PINNACLE

## 2022-11-30 DEVICE — APEX HOLE ELIMINATOR - PS
Type: IMPLANTABLE DEVICE | Site: HIP | Status: FUNCTIONAL
Brand: APEX

## 2022-11-30 DEVICE — PINNACLE CANCELLOUS BONE SCREW 6.5MM X 20MM
Type: IMPLANTABLE DEVICE | Site: HIP | Status: FUNCTIONAL
Brand: PINNACLE

## 2022-11-30 DEVICE — CORAIL HIP SYSTEM CEMENTLESS FEMORAL STEM HA COATED REVISION 12/14 AMT 135 DEGREES STANDARD COLLARED SIZE 13
Type: IMPLANTABLE DEVICE | Site: HIP | Status: FUNCTIONAL
Brand: CORAIL

## 2022-11-30 DEVICE — PINNACLE GRIPTION ACETABULAR SHELL MULTI-HOLE 64MM OD
Type: IMPLANTABLE DEVICE | Site: HIP | Status: FUNCTIONAL
Brand: PINNACLE GRIPTION

## 2022-11-30 RX ORDER — HYDROMORPHONE HCL IN WATER/PF 6 MG/30 ML
0.2 PATIENT CONTROLLED ANALGESIA SYRINGE INTRAVENOUS EVERY 5 MIN PRN
Status: DISCONTINUED | OUTPATIENT
Start: 2022-11-30 | End: 2022-11-30 | Stop reason: HOSPADM

## 2022-11-30 RX ORDER — CELECOXIB 200 MG/1
400 CAPSULE ORAL ONCE
Status: COMPLETED | OUTPATIENT
Start: 2022-11-30 | End: 2022-11-30

## 2022-11-30 RX ORDER — FENTANYL CITRATE 0.05 MG/ML
50 INJECTION, SOLUTION INTRAMUSCULAR; INTRAVENOUS
Status: DISCONTINUED | OUTPATIENT
Start: 2022-11-30 | End: 2022-11-30 | Stop reason: HOSPADM

## 2022-11-30 RX ORDER — ACETAMINOPHEN 325 MG/1
975 TABLET ORAL EVERY 8 HOURS
Status: DISCONTINUED | OUTPATIENT
Start: 2022-11-30 | End: 2022-12-02 | Stop reason: HOSPADM

## 2022-11-30 RX ORDER — PROPOFOL 10 MG/ML
INJECTION, EMULSION INTRAVENOUS CONTINUOUS PRN
Status: DISCONTINUED | OUTPATIENT
Start: 2022-11-30 | End: 2022-11-30

## 2022-11-30 RX ORDER — SODIUM CHLORIDE, SODIUM LACTATE, POTASSIUM CHLORIDE, CALCIUM CHLORIDE 600; 310; 30; 20 MG/100ML; MG/100ML; MG/100ML; MG/100ML
INJECTION, SOLUTION INTRAVENOUS CONTINUOUS
Status: DISCONTINUED | OUTPATIENT
Start: 2022-11-30 | End: 2022-11-30 | Stop reason: HOSPADM

## 2022-11-30 RX ORDER — PREGABALIN 150 MG/1
150 CAPSULE ORAL ONCE
Status: COMPLETED | OUTPATIENT
Start: 2022-11-30 | End: 2022-11-30

## 2022-11-30 RX ORDER — KETOROLAC TROMETHAMINE 15 MG/ML
15 INJECTION, SOLUTION INTRAMUSCULAR; INTRAVENOUS EVERY 6 HOURS
Status: COMPLETED | OUTPATIENT
Start: 2022-11-30 | End: 2022-12-01

## 2022-11-30 RX ORDER — OXYCODONE HYDROCHLORIDE 5 MG/1
5 TABLET ORAL EVERY 4 HOURS PRN
Status: DISCONTINUED | OUTPATIENT
Start: 2022-11-30 | End: 2022-12-02 | Stop reason: HOSPADM

## 2022-11-30 RX ORDER — POLYETHYLENE GLYCOL 3350 17 G/17G
17 POWDER, FOR SOLUTION ORAL DAILY
Status: DISCONTINUED | OUTPATIENT
Start: 2022-12-01 | End: 2022-12-02 | Stop reason: HOSPADM

## 2022-11-30 RX ORDER — FENTANYL CITRATE 0.05 MG/ML
25 INJECTION, SOLUTION INTRAMUSCULAR; INTRAVENOUS
Status: DISCONTINUED | OUTPATIENT
Start: 2022-11-30 | End: 2022-11-30 | Stop reason: HOSPADM

## 2022-11-30 RX ORDER — HYDROMORPHONE HCL IN WATER/PF 6 MG/30 ML
0.4 PATIENT CONTROLLED ANALGESIA SYRINGE INTRAVENOUS
Status: DISCONTINUED | OUTPATIENT
Start: 2022-11-30 | End: 2022-12-02 | Stop reason: HOSPADM

## 2022-11-30 RX ORDER — MEPERIDINE HYDROCHLORIDE 25 MG/ML
12.5 INJECTION INTRAMUSCULAR; INTRAVENOUS; SUBCUTANEOUS
Status: DISCONTINUED | OUTPATIENT
Start: 2022-11-30 | End: 2022-11-30 | Stop reason: HOSPADM

## 2022-11-30 RX ORDER — GLYCOPYRROLATE 0.2 MG/ML
INJECTION, SOLUTION INTRAMUSCULAR; INTRAVENOUS PRN
Status: DISCONTINUED | OUTPATIENT
Start: 2022-11-30 | End: 2022-11-30

## 2022-11-30 RX ORDER — NEOSTIGMINE METHYLSULFATE 1 MG/ML
VIAL (ML) INJECTION PRN
Status: DISCONTINUED | OUTPATIENT
Start: 2022-11-30 | End: 2022-11-30

## 2022-11-30 RX ORDER — FENTANYL CITRATE 0.05 MG/ML
25 INJECTION, SOLUTION INTRAMUSCULAR; INTRAVENOUS EVERY 5 MIN PRN
Status: DISCONTINUED | OUTPATIENT
Start: 2022-11-30 | End: 2022-11-30 | Stop reason: HOSPADM

## 2022-11-30 RX ORDER — BISACODYL 10 MG
10 SUPPOSITORY, RECTAL RECTAL DAILY PRN
Status: DISCONTINUED | OUTPATIENT
Start: 2022-11-30 | End: 2022-12-02 | Stop reason: HOSPADM

## 2022-11-30 RX ORDER — CEFAZOLIN SODIUM/WATER 3 G/30 ML
3 SYRINGE (ML) INTRAVENOUS SEE ADMIN INSTRUCTIONS
Status: DISCONTINUED | OUTPATIENT
Start: 2022-11-30 | End: 2022-11-30 | Stop reason: HOSPADM

## 2022-11-30 RX ORDER — HYDROXYZINE HYDROCHLORIDE 25 MG/1
25 TABLET, FILM COATED ORAL EVERY 6 HOURS PRN
Status: DISCONTINUED | OUTPATIENT
Start: 2022-11-30 | End: 2022-12-02 | Stop reason: HOSPADM

## 2022-11-30 RX ORDER — OXYCODONE HYDROCHLORIDE 5 MG/1
10 TABLET ORAL EVERY 4 HOURS PRN
Status: DISCONTINUED | OUTPATIENT
Start: 2022-11-30 | End: 2022-12-02 | Stop reason: HOSPADM

## 2022-11-30 RX ORDER — CEFAZOLIN SODIUM/WATER 3 G/30 ML
3 SYRINGE (ML) INTRAVENOUS
Status: COMPLETED | OUTPATIENT
Start: 2022-11-30 | End: 2022-11-30

## 2022-11-30 RX ORDER — NALOXONE HYDROCHLORIDE 0.4 MG/ML
0.4 INJECTION, SOLUTION INTRAMUSCULAR; INTRAVENOUS; SUBCUTANEOUS
Status: DISCONTINUED | OUTPATIENT
Start: 2022-11-30 | End: 2022-12-02 | Stop reason: HOSPADM

## 2022-11-30 RX ORDER — ACETAMINOPHEN 325 MG/1
650 TABLET ORAL EVERY 4 HOURS PRN
Status: DISCONTINUED | OUTPATIENT
Start: 2022-12-03 | End: 2022-12-02 | Stop reason: HOSPADM

## 2022-11-30 RX ORDER — PROCHLORPERAZINE MALEATE 10 MG
10 TABLET ORAL EVERY 6 HOURS PRN
Status: DISCONTINUED | OUTPATIENT
Start: 2022-11-30 | End: 2022-12-02 | Stop reason: HOSPADM

## 2022-11-30 RX ORDER — ONDANSETRON 4 MG/1
4 TABLET, ORALLY DISINTEGRATING ORAL EVERY 6 HOURS PRN
Status: DISCONTINUED | OUTPATIENT
Start: 2022-11-30 | End: 2022-12-02 | Stop reason: HOSPADM

## 2022-11-30 RX ORDER — ONDANSETRON 2 MG/ML
4 INJECTION INTRAMUSCULAR; INTRAVENOUS EVERY 6 HOURS PRN
Status: DISCONTINUED | OUTPATIENT
Start: 2022-11-30 | End: 2022-12-02 | Stop reason: HOSPADM

## 2022-11-30 RX ORDER — PROPOFOL 10 MG/ML
INJECTION, EMULSION INTRAVENOUS PRN
Status: DISCONTINUED | OUTPATIENT
Start: 2022-11-30 | End: 2022-11-30

## 2022-11-30 RX ORDER — DEXAMETHASONE SODIUM PHOSPHATE 4 MG/ML
INJECTION, SOLUTION INTRA-ARTICULAR; INTRALESIONAL; INTRAMUSCULAR; INTRAVENOUS; SOFT TISSUE PRN
Status: DISCONTINUED | OUTPATIENT
Start: 2022-11-30 | End: 2022-11-30

## 2022-11-30 RX ORDER — FENTANYL CITRATE 0.05 MG/ML
50 INJECTION, SOLUTION INTRAMUSCULAR; INTRAVENOUS EVERY 5 MIN PRN
Status: DISCONTINUED | OUTPATIENT
Start: 2022-11-30 | End: 2022-11-30 | Stop reason: HOSPADM

## 2022-11-30 RX ORDER — LIDOCAINE 40 MG/G
CREAM TOPICAL
Status: DISCONTINUED | OUTPATIENT
Start: 2022-11-30 | End: 2022-11-30 | Stop reason: HOSPADM

## 2022-11-30 RX ORDER — VANCOMYCIN HYDROCHLORIDE 1 G/200ML
INJECTION, SOLUTION INTRAVENOUS PRN
Status: DISCONTINUED | OUTPATIENT
Start: 2022-11-30 | End: 2022-11-30

## 2022-11-30 RX ORDER — HYDROMORPHONE HCL IN WATER/PF 6 MG/30 ML
0.2 PATIENT CONTROLLED ANALGESIA SYRINGE INTRAVENOUS
Status: DISCONTINUED | OUTPATIENT
Start: 2022-11-30 | End: 2022-12-02 | Stop reason: HOSPADM

## 2022-11-30 RX ORDER — AMOXICILLIN 250 MG
1 CAPSULE ORAL 2 TIMES DAILY
Status: DISCONTINUED | OUTPATIENT
Start: 2022-11-30 | End: 2022-12-02 | Stop reason: HOSPADM

## 2022-11-30 RX ORDER — CALCIUM CARBONATE 500 MG/1
500 TABLET, CHEWABLE ORAL 4 TIMES DAILY PRN
Status: DISCONTINUED | OUTPATIENT
Start: 2022-11-30 | End: 2022-12-02 | Stop reason: HOSPADM

## 2022-11-30 RX ORDER — VANCOMYCIN HYDROCHLORIDE 1 G/20ML
INJECTION, POWDER, LYOPHILIZED, FOR SOLUTION INTRAVENOUS PRN
Status: DISCONTINUED | OUTPATIENT
Start: 2022-11-30 | End: 2022-11-30 | Stop reason: HOSPADM

## 2022-11-30 RX ORDER — TRANEXAMIC ACID 650 MG/1
1950 TABLET ORAL ONCE
Status: COMPLETED | OUTPATIENT
Start: 2022-11-30 | End: 2022-11-30

## 2022-11-30 RX ORDER — CEFAZOLIN SODIUM 2 G/100ML
2 INJECTION, SOLUTION INTRAVENOUS EVERY 8 HOURS
Status: COMPLETED | OUTPATIENT
Start: 2022-11-30 | End: 2022-12-01

## 2022-11-30 RX ORDER — LIDOCAINE HYDROCHLORIDE 20 MG/ML
INJECTION, SOLUTION INFILTRATION; PERINEURAL PRN
Status: DISCONTINUED | OUTPATIENT
Start: 2022-11-30 | End: 2022-11-30

## 2022-11-30 RX ORDER — FENTANYL CITRATE 50 UG/ML
INJECTION, SOLUTION INTRAMUSCULAR; INTRAVENOUS PRN
Status: DISCONTINUED | OUTPATIENT
Start: 2022-11-30 | End: 2022-11-30

## 2022-11-30 RX ORDER — SODIUM CHLORIDE, SODIUM LACTATE, POTASSIUM CHLORIDE, CALCIUM CHLORIDE 600; 310; 30; 20 MG/100ML; MG/100ML; MG/100ML; MG/100ML
INJECTION, SOLUTION INTRAVENOUS CONTINUOUS
Status: DISCONTINUED | OUTPATIENT
Start: 2022-11-30 | End: 2022-12-02 | Stop reason: HOSPADM

## 2022-11-30 RX ORDER — LIDOCAINE 40 MG/G
CREAM TOPICAL
Status: DISCONTINUED | OUTPATIENT
Start: 2022-11-30 | End: 2022-12-02 | Stop reason: HOSPADM

## 2022-11-30 RX ORDER — NALOXONE HYDROCHLORIDE 0.4 MG/ML
0.2 INJECTION, SOLUTION INTRAMUSCULAR; INTRAVENOUS; SUBCUTANEOUS
Status: DISCONTINUED | OUTPATIENT
Start: 2022-11-30 | End: 2022-12-02 | Stop reason: HOSPADM

## 2022-11-30 RX ORDER — ACETAMINOPHEN 325 MG/1
975 TABLET ORAL ONCE
Status: COMPLETED | OUTPATIENT
Start: 2022-11-30 | End: 2022-11-30

## 2022-11-30 RX ORDER — ONDANSETRON 4 MG/1
4 TABLET, ORALLY DISINTEGRATING ORAL EVERY 30 MIN PRN
Status: DISCONTINUED | OUTPATIENT
Start: 2022-11-30 | End: 2022-11-30 | Stop reason: HOSPADM

## 2022-11-30 RX ORDER — HYDROMORPHONE HCL IN WATER/PF 6 MG/30 ML
0.4 PATIENT CONTROLLED ANALGESIA SYRINGE INTRAVENOUS EVERY 5 MIN PRN
Status: DISCONTINUED | OUTPATIENT
Start: 2022-11-30 | End: 2022-11-30 | Stop reason: HOSPADM

## 2022-11-30 RX ORDER — ONDANSETRON 2 MG/ML
INJECTION INTRAMUSCULAR; INTRAVENOUS PRN
Status: DISCONTINUED | OUTPATIENT
Start: 2022-11-30 | End: 2022-11-30

## 2022-11-30 RX ORDER — DIPHENHYDRAMINE HCL 25 MG
25 CAPSULE ORAL EVERY 6 HOURS PRN
Status: DISCONTINUED | OUTPATIENT
Start: 2022-11-30 | End: 2022-12-02 | Stop reason: HOSPADM

## 2022-11-30 RX ORDER — ONDANSETRON 2 MG/ML
4 INJECTION INTRAMUSCULAR; INTRAVENOUS EVERY 30 MIN PRN
Status: DISCONTINUED | OUTPATIENT
Start: 2022-11-30 | End: 2022-11-30 | Stop reason: HOSPADM

## 2022-11-30 RX ORDER — SODIUM CHLORIDE, SODIUM LACTATE, POTASSIUM CHLORIDE, CALCIUM CHLORIDE 600; 310; 30; 20 MG/100ML; MG/100ML; MG/100ML; MG/100ML
INJECTION, SOLUTION INTRAVENOUS CONTINUOUS PRN
Status: DISCONTINUED | OUTPATIENT
Start: 2022-11-30 | End: 2022-11-30

## 2022-11-30 RX ADMIN — SODIUM CHLORIDE, POTASSIUM CHLORIDE, SODIUM LACTATE AND CALCIUM CHLORIDE: 600; 310; 30; 20 INJECTION, SOLUTION INTRAVENOUS at 21:12

## 2022-11-30 RX ADMIN — NEOSTIGMINE METHYLSULFATE 5 MG: 1 INJECTION, SOLUTION INTRAVENOUS at 11:05

## 2022-11-30 RX ADMIN — ACETAMINOPHEN 975 MG: 325 TABLET, FILM COATED ORAL at 14:07

## 2022-11-30 RX ADMIN — PROPOFOL 30 MCG/KG/MIN: 10 INJECTION, EMULSION INTRAVENOUS at 08:14

## 2022-11-30 RX ADMIN — PHENYLEPHRINE HYDROCHLORIDE 100 MCG: 10 INJECTION INTRAVENOUS at 08:08

## 2022-11-30 RX ADMIN — PHENYLEPHRINE HYDROCHLORIDE 50 MCG: 10 INJECTION INTRAVENOUS at 09:35

## 2022-11-30 RX ADMIN — PHENYLEPHRINE HYDROCHLORIDE 100 MCG: 10 INJECTION INTRAVENOUS at 10:27

## 2022-11-30 RX ADMIN — DEXMEDETOMIDINE HYDROCHLORIDE 12 MCG: 100 INJECTION, SOLUTION INTRAVENOUS at 09:53

## 2022-11-30 RX ADMIN — CELECOXIB 400 MG: 200 CAPSULE ORAL at 06:21

## 2022-11-30 RX ADMIN — KETOROLAC TROMETHAMINE 15 MG: 15 INJECTION, SOLUTION INTRAMUSCULAR; INTRAVENOUS at 19:21

## 2022-11-30 RX ADMIN — HYDROMORPHONE HYDROCHLORIDE 0.5 MG: 1 INJECTION, SOLUTION INTRAMUSCULAR; INTRAVENOUS; SUBCUTANEOUS at 09:52

## 2022-11-30 RX ADMIN — SENNOSIDES AND DOCUSATE SODIUM 2 TABLET: 50; 8.6 TABLET ORAL at 21:12

## 2022-11-30 RX ADMIN — Medication 3 G: at 07:51

## 2022-11-30 RX ADMIN — CEFAZOLIN SODIUM 2 G: 2 INJECTION, SOLUTION INTRAVENOUS at 15:57

## 2022-11-30 RX ADMIN — PHENYLEPHRINE HYDROCHLORIDE 0.2 MCG/KG/MIN: 10 INJECTION INTRAVENOUS at 08:26

## 2022-11-30 RX ADMIN — ACETAMINOPHEN 975 MG: 325 TABLET, FILM COATED ORAL at 06:21

## 2022-11-30 RX ADMIN — SODIUM CHLORIDE, POTASSIUM CHLORIDE, SODIUM LACTATE AND CALCIUM CHLORIDE: 600; 310; 30; 20 INJECTION, SOLUTION INTRAVENOUS at 08:01

## 2022-11-30 RX ADMIN — PHENYLEPHRINE HYDROCHLORIDE 100 MCG: 10 INJECTION INTRAVENOUS at 10:00

## 2022-11-30 RX ADMIN — SODIUM CHLORIDE, POTASSIUM CHLORIDE, SODIUM LACTATE AND CALCIUM CHLORIDE: 600; 310; 30; 20 INJECTION, SOLUTION INTRAVENOUS at 09:29

## 2022-11-30 RX ADMIN — SODIUM CHLORIDE, POTASSIUM CHLORIDE, SODIUM LACTATE AND CALCIUM CHLORIDE: 600; 310; 30; 20 INJECTION, SOLUTION INTRAVENOUS at 06:45

## 2022-11-30 RX ADMIN — PHENYLEPHRINE HYDROCHLORIDE 100 MCG: 10 INJECTION INTRAVENOUS at 08:29

## 2022-11-30 RX ADMIN — SODIUM CHLORIDE, POTASSIUM CHLORIDE, SODIUM LACTATE AND CALCIUM CHLORIDE: 600; 310; 30; 20 INJECTION, SOLUTION INTRAVENOUS at 12:39

## 2022-11-30 RX ADMIN — PHENYLEPHRINE HYDROCHLORIDE 100 MCG: 10 INJECTION INTRAVENOUS at 10:11

## 2022-11-30 RX ADMIN — PROPOFOL 260 MG: 10 INJECTION, EMULSION INTRAVENOUS at 07:47

## 2022-11-30 RX ADMIN — PREGABALIN 150 MG: 150 CAPSULE ORAL at 06:21

## 2022-11-30 RX ADMIN — DEXAMETHASONE SODIUM PHOSPHATE 10 MG: 4 INJECTION, SOLUTION INTRA-ARTICULAR; INTRALESIONAL; INTRAMUSCULAR; INTRAVENOUS; SOFT TISSUE at 07:52

## 2022-11-30 RX ADMIN — VANCOMYCIN HYDROCHLORIDE 1 G: 1 INJECTION, SOLUTION INTRAVENOUS at 08:08

## 2022-11-30 RX ADMIN — ONDANSETRON 4 MG: 2 INJECTION INTRAMUSCULAR; INTRAVENOUS at 10:25

## 2022-11-30 RX ADMIN — PHENYLEPHRINE HYDROCHLORIDE 100 MCG: 10 INJECTION INTRAVENOUS at 10:19

## 2022-11-30 RX ADMIN — HYDROMORPHONE HYDROCHLORIDE 0.5 MG: 1 INJECTION, SOLUTION INTRAMUSCULAR; INTRAVENOUS; SUBCUTANEOUS at 08:47

## 2022-11-30 RX ADMIN — ROCURONIUM BROMIDE 50 MG: 50 INJECTION, SOLUTION INTRAVENOUS at 07:47

## 2022-11-30 RX ADMIN — HYDROMORPHONE HYDROCHLORIDE 0.5 MG: 1 INJECTION, SOLUTION INTRAMUSCULAR; INTRAVENOUS; SUBCUTANEOUS at 08:17

## 2022-11-30 RX ADMIN — MIDAZOLAM 2 MG: 1 INJECTION INTRAMUSCULAR; INTRAVENOUS at 07:42

## 2022-11-30 RX ADMIN — ROCURONIUM BROMIDE 50 MG: 50 INJECTION, SOLUTION INTRAVENOUS at 09:04

## 2022-11-30 RX ADMIN — FENTANYL CITRATE 100 MCG: 50 INJECTION, SOLUTION INTRAMUSCULAR; INTRAVENOUS at 07:47

## 2022-11-30 RX ADMIN — ROCURONIUM BROMIDE 20 MG: 50 INJECTION, SOLUTION INTRAVENOUS at 09:25

## 2022-11-30 RX ADMIN — GLYCOPYRROLATE 0.8 MG: 0.2 INJECTION, SOLUTION INTRAMUSCULAR; INTRAVENOUS at 11:05

## 2022-11-30 RX ADMIN — DEXMEDETOMIDINE HYDROCHLORIDE 20 MCG: 100 INJECTION, SOLUTION INTRAVENOUS at 08:56

## 2022-11-30 RX ADMIN — LIDOCAINE HYDROCHLORIDE 80 MG: 20 INJECTION, SOLUTION INFILTRATION; PERINEURAL at 07:47

## 2022-11-30 RX ADMIN — TRANEXAMIC ACID 1950 MG: 650 TABLET ORAL at 06:21

## 2022-11-30 RX ADMIN — ACETAMINOPHEN 975 MG: 325 TABLET, FILM COATED ORAL at 21:11

## 2022-11-30 ASSESSMENT — ACTIVITIES OF DAILY LIVING (ADL)
DOING_ERRANDS_INDEPENDENTLY_DIFFICULTY: YES
ADLS_ACUITY_SCORE: 22

## 2022-11-30 ASSESSMENT — LIFESTYLE VARIABLES: TOBACCO_USE: 0

## 2022-11-30 NOTE — PROGRESS NOTES
Lab unable to use purple top collected earlier for Type and screen ordered by Dr. Amin.  Lab called to have stat draw done.

## 2022-11-30 NOTE — ANESTHESIA PREPROCEDURE EVALUATION
Anesthesia Pre-Procedure Evaluation    Patient: Aba Gan   MRN: [id number] : 1970        Procedure : Procedure(s):  RIGHT HIP EXPLANT ANTIBIOTIC SPACER; RIGHT TOTAL HIP ARTHROPLASTY REIMPLANTATION          Past Medical History:   Diagnosis Date     Blood in urine     SINCE .  ALWAYS HAS A SMALL AMOUNT OF rbc'S IN URINE     Hyperlipidemia      Obese      Plantar fasciitis      Sleep apnea     DOESN'T USE CPAP      Past Surgical History:   Procedure Laterality Date     ARTHROPLASTY REVISION HIP ANTERIOR Right 2021    Procedure: RIGHT HIP IRRIGATION AND DEBRIDEMENT WITH POLY AND HEAD EXCHANGE.;  Surgeon: Eric Almaraz MD;  Location: [facility] OR     AS TOTAL HIP ARTHROPLASTY Right      COLONOSCOPY       GENITOURINARY SURGERY       IRRIGATION AND DEBRIDEMENT HIP, PLACE ANTIBIOTIC CEMENT BEADS, COMBINED Right 2022    Procedure: IRRIGATION AND DEBRIDEMENT, PLACEMENT OF ANTIBIOTIC SPACER;  Surgeon: Eric Almaraz MD;  Location: [facility] OR     KNEE ARTHROSCOPY AND ARTHROTOMY       REMOVE HARDWARE ARTHROPLASTY HIP Right 2022    Procedure: RIGHT HIP EXPLANT,;  Surgeon: Eric Almaraz MD;  Location:  OR     TONSILLECTOMY      AS A CHILD     VASECTOMY        No Known Allergies   Social History     Tobacco Use     Smoking status: Never     Smokeless tobacco: Never   Substance Use Topics     Alcohol use: Yes     Comment: 6 DRINKS PER WEEK      Wt Readings from Last 1 Encounters:   22 123.5 kg (272 lb 4.8 oz)        Anesthesia Evaluation   Pt has had prior anesthetic.     History of anesthetic complications       ROS/MED HX  ENT/Pulmonary:     (+) sleep apnea, doesn't use CPAP,  (-) tobacco use and asthma   Neurologic:       Cardiovascular:     (+) Dyslipidemia -----    METS/Exercise Tolerance:     Hematologic:       Musculoskeletal:       GI/Hepatic:    (-) GERD   Renal/Genitourinary:       Endo:     (+) Obesity,     Psychiatric/Substance Use:       Infectious Disease:       Malignancy:        Other:            Physical Exam    Airway        Mallampati: II   TM distance: > 3 FB   Neck ROM: full   Mouth opening: > 3 cm    Respiratory Devices and Support         Dental  no notable dental history         Cardiovascular   cardiovascular exam normal          Pulmonary   pulmonary exam normal                OUTSIDE LABS:  CBC:   Lab Results   Component Value Date    WBC 5.1 10/24/2022    WBC 5.1 10/17/2022    HGB 11.1 (L) 10/24/2022    HGB 11.0 (L) 10/17/2022    HCT 36.1 (L) 10/24/2022    HCT 36.0 (L) 10/17/2022     10/24/2022     10/17/2022     BMP:   Lab Results   Component Value Date     09/20/2022     12/26/2021    POTASSIUM 3.9 09/21/2022    POTASSIUM 3.9 09/20/2022    CHLORIDE 105 09/20/2022    CHLORIDE 106 12/26/2021    CO2 25 09/20/2022    CO2 25 12/26/2021    BUN 12 09/20/2022    BUN 11 02/08/2022    CR 0.71 10/24/2022    CR 0.72 10/17/2022     (H) 09/21/2022     (H) 09/20/2022     (H) 09/20/2022     COAGS:   Lab Results   Component Value Date    INR 1.14 12/26/2021     POC: No results found for: BGM, HCG, HCGS  HEPATIC:   Lab Results   Component Value Date    ALBUMIN 2.6 (L) 12/29/2021    PROTTOTAL 6.7 (L) 12/29/2021    ALT 29 02/01/2022    AST 21 10/24/2022    ALKPHOS 66 12/29/2021    BILITOTAL 0.5 12/29/2021     OTHER:   Lab Results   Component Value Date    LACT 1.1 12/26/2021    SCOUT 8.1 (L) 09/20/2022    CRP <2.9 11/09/2022    SED 19 11/09/2022       Anesthesia Plan    ASA Status:  2      Anesthesia Type: General.     - Airway: ETT   Induction: Intravenous.      Techniques and Equipment:     - Airway: Video-Laryngoscope     - Lines/Monitors: 2nd IV     Consents    Anesthesia Plan(s) and associated risks, benefits, and realistic alternatives discussed. Questions answered and patient/representative(s) expressed understanding.    - Discussed:     - Discussed with:  Patient         Postoperative Care    Pain management: IV analgesics, Oral pain  medications.   PONV prophylaxis: Ondansetron (or other 5HT-3), Dexamethasone or Solumedrol, Background Propofol Infusion     Comments:                Myrtle Amin

## 2022-11-30 NOTE — CONSULTS
Hospitalist Consult Note  11/30/2022  3:39 PM      This is a 52 year old male with a past medical history of dyslipidemia. The patient presents today for an elective right hip explant antibiotic spacer and right SHERLY reimplanation. Surgery was performed by Dr. Denton, under general anesthesia without any surgical complications noted. The patient remains with vital signs within normal limits, pain is well managed, and care thus far is as expected. I have reviewed the H&P, reviewed and reconciled prior to admission medications.     Given the stable nature of the patient's chronic medical conditions, will defer formal consult for now. Routine post-operative cares, IVF, DVT prophylaxis, and pain management to orthopedic service. Recommend judicious use of narcotics given patient's age and risk for delirium.    DWIGHT [facility]  Hospitalist Service  Pager: [phone] (5c - 6g)

## 2022-11-30 NOTE — PROGRESS NOTES
OK by ELDER Amin to transfer to the floor. Sent with two belonging bags and one crutch. Pt says spouse is at work, ok to send text update.

## 2022-11-30 NOTE — ANESTHESIA CARE TRANSFER NOTE
Patient: Aba aGn    Procedure: Procedure(s):  RIGHT HIP EXPLANT ANTIBIOTIC SPACER; RIGHT TOTAL HIP ARTHROPLASTY REIMPLANTATION       Diagnosis: Pain in hip joint [M25.559]  Inflammatory reaction due to internal prosthesis of right hip, subsequent encounter [T84.51XD]  Diagnosis Additional Information: No value filed.    Anesthesia Type:   General     Note:    Oropharynx: oropharynx clear of all foreign objects and spontaneously breathing  Level of Consciousness: awake  Oxygen Supplementation: face mask  Level of Supplemental Oxygen (L/min / FiO2): 6  Independent Airway: airway patency satisfactory and stable  Dentition: dentition unchanged  Vital Signs Stable: post-procedure vital signs reviewed and stable  Report to RN Given: handoff report given  Patient transferred to: PACU  Comments: Neuromuscular blockade reversed after TOF 4/4, spontaneous respirations, adequate tidal volumes, followed commands to voice, oropharynx suctioned with soft flexible catheter, extubated atraumatically, extubated with suction, airway patent after extubation.  Oxygen via facemask at 6 liters per minute to PACU. Oxygen tubing connected to wall O2 in PACU, SpO2, NiBP, and EKG monitors and alarms on and functioning, Servando Hugger warmer connected to patient gown, report on patient's clinical status given to PACU RN, RN questions answered.     Handoff Report: Identifed the Patient, Identified the Reponsible Provider, Reviewed the pertinent medical history, Discussed the surgical course, Reviewed Intra-OP anesthesia mangement and issues during anesthesia, Set expectations for post-procedure period and Allowed opportunity for questions and acknowledgement of understanding      Vitals:  Vitals Value Taken Time   /85 11/30/22 1116   Temp     Pulse 83 11/30/22 1117   Resp 21 11/30/22 1117   SpO2 97 % 11/30/22 1117   Vitals shown include unvalidated device data.    Electronically Signed By: DWIGHT Alicea CRNA  November 30,  2022  11:19 AM

## 2022-11-30 NOTE — PROGRESS NOTES
SPIRITUAL HEALTH SERVICES  [facility] Main PACU  PRE-SURGICAL VISIT    I visited with Petty Aba and his wife.    I offered spiritual and emotional support and said a prayer.    [facility] remains available.    Rev [facility] [place]  Associate   Spiritual Health Phone Line [phone]  Spiritual Health Pager [phone]

## 2022-11-30 NOTE — ANESTHESIA PROCEDURE NOTES
Airway       Patient location during procedure: OR       Procedure Start/Stop Times: 11/30/2022 7:49 AM  Staff -        Anesthesiologist:  Myrtle Amin       CRNA: Helen Page APRN CRNA       Performed By: CRNA  Consent for Airway        Urgency: elective  Indications and Patient Condition       Indications for airway management: christy-procedural       Induction type:intravenous       Mask difficulty assessment: 2 - vent by mask + OA or adjuvant +/- NMBA    Final Airway Details       Final airway type: endotracheal airway       Successful airway: ETT - single  Endotracheal Airway Details        ETT size (mm): 8.0       Cuffed: yes       Successful intubation technique: video laryngoscopy       VL Blade Size: Valentin 4       Grade View of Cords: 1       Adjucts: stylet       Bite block used: None    Post intubation assessment        Placement verified by: capnometry, equal breath sounds and chest rise        Number of attempts at approach: 1       Secured with: silk tape       Ease of procedure: easy       Dentition: Unchanged    Medication(s) Administered   Medication Administration Time: 11/30/2022 7:49 AM

## 2022-11-30 NOTE — PROGRESS NOTES
11/30/22 1501   Appointment Info   Signing Clinician's Name / Credentials (PT) Brittany Dressler, PT   Living Environment   People in Home child(shea), adult;spouse  (around to help)   Current Living Arrangements house   Home Accessibility stairs within home;stairs to enter home   Number of Stairs, Main Entrance 2   Stair Railings, Main Entrance none   Number of Stairs, Within Home, Primary none   Living Environment Comments elevated toilet seat. no railings. 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   Pertinent History of Current Problem (include personal factors and/or comorbidities that impact the POC) R SHERLY revision given infection.   Existing Precautions/Restrictions fall;no active hip ABD;90 degree hip flexion   Weight-Bearing Status - RLE weight-bearing as tolerated   Cognition   Affect/Mental Status (Cognition) WFL   Follows Commands (Cognition) WFL   Pain Assessment   Patient Currently in Pain Yes, see Vital Sign flowsheet  (R hip - to tolerance.)   Posture    Posture Comments WFL   Range of Motion (ROM)   ROM Comment Limited R hip post-op.   Strength (Manual Muscle Testing)   Strength Comments Knee ext MMT 5B   Bed Mobility   Comment, (Bed Mobility) Allyn Supine-sit for R LE with edu precuations - see rx.   Transfers   Comment, (Transfers) CGA sit-stands and pivot RW step-to pattern to start, from elevated surface.   Gait/Stairs (Locomotion)   Distance in Feet (Required for LE Total  Joints) 10' RW CGA step-to progressing to modified step-through, mild UE WB.   Distance in Feet (Gait) 10' + 20' RW CGA step-to progressing to modfiied step-through.   Balance   Balance Comments Sit balance SBA, standing balance SBA   Sensory Examination   Sensory Perception Comments WFL   Coordination   Coordination Comments WFL   Muscle Tone   Muscle Tone Comments WFL   Clinical Impression   Criteria for Skilled Therapeutic Intervention Yes, treatment indicated   PT Diagnosis (PT) Impaired mobility   Influenced by the following impairments Impaired functional strength, balance, activity tolerance   Functional limitations due to impairments Impaired independent mobility & livnig   Clinical Presentation (PT Evaluation Complexity) Evolving/Changing   Clinical Presentation Rationale Sx   Clinical Decision Making (Complexity) low complexity   Planned Therapy Interventions (PT) balance training;bed mobility training;gait training;home exercise program;neuromuscular re-education;patient/family education;postural re-education;stair training;strengthening;transfer training;progressive activity/exercise;risk factor education;home program guidelines   Anticipated Equipment Needs at Discharge (PT) gait belt;raised toilet seat;shower chair;walker, rolling   Risk & Benefits of therapy have been explained evaluation/treatment results reviewed;risks/benefits reviewed;current/potential barriers reviewed;care plan/treatment goals reviewed;participants voiced agreement with care plan;participants included;patient   PT Total Evaluation Time   PT Eval, Low Complexity Minutes (28796) 15   Physical Therapy Goals   PT Frequency 2x/day   PT Predicted Duration/Target Date for Goal Attainment 12/03/22   PT Goals Bed Mobility;Transfers;Gait;Stairs;PT Goal 1   PT: Bed Mobility Supervision/stand-by assist;Supine to/from sit;Within precautions   PT: Transfers Supervision/stand-by assist;Sit to/from stand;Bed to/from chair;Within  precautions;Assistive device   PT: Gait Supervision/stand-by assist;Rolling walker;Greater than 200 feet;Within precautions   PT: Stairs 2 stairs;Minimal assist  (B HHA from 2 family members (simulated))   PT: Goal 1 Pt will be Renata in HEP teach back with handout.   Interventions   Interventions Quick Adds Gait Training;Therapeutic Activity;Therapeutic Procedure   Therapeutic Procedure/Exercise   Ther. Procedure: strength, endurance, ROM, flexibillity Minutes (24104) 10   Symptoms Noted During/After Treatment fatigue;increased pain  (to tolerance)   Treatment Detail/Skilled Intervention Pt instructed by PT in post-SHERLY HEP with handout provision, SBA for technique cues - seated knee flx with stretch hold, ext, supine APs, glut / quad / hams sets- a few reps each. Reviewed sit-stands.   Therapeutic Activity   Therapeutic Activities: dynamic activities to improve functional performance Minutes (38208) 30   Symptoms Noted During/After Treatment Dizziness   Treatment Detail/Skilled Intervention Pt agreeable to PT: Reviewed precautions & modified mobility. Allyn supine-sit via pivot with R LE assist for comfort and given precautions as pt using UEs for balance, supervision EOB sit balance, 3 sit-stands, CGA wtih good hand placement, slow yet steady. Asx standing wtih strong BP to start, pivots CGA RW B step-to. SBA standing balnace for toileting & hand hygiene. Lightheadedness after toileting, moderate progressing to severe in stance so sat for BP read which was stable as sx were still mod-severe. Sit-supine Allyn for R LE, finished in bed alarm armed.   Stairs   Exercise Details Held given sx with brief acitivty, pt in agreement.   PT Discharge Planning   PT Plan PT: stairs, amb as sx allow - monitor BP.   PT Rationale for DC Rec Pt with severe lightheadedness from toileting today, unsafe to perform stairs to clear for safe DC home today (POD0). Will have Ax2 on stairs to return home, and Ax1 iwth RW at home so once sx  calm down pt should do well.   PT Brief overview of current status Allyn bed mobility, CGA tx and amb RW with up to severe lightheadedness though BP stable.   Total Session Time   Timed Code Treatment Minutes 40   Total Session Time (sum of timed and untimed services) 55

## 2022-11-30 NOTE — ANESTHESIA POSTPROCEDURE EVALUATION
Patient: Aba Gan    Procedure: Procedure(s):  RIGHT HIP EXPLANT ANTIBIOTIC SPACER; RIGHT TOTAL HIP ARTHROPLASTY REIMPLANTATION       Anesthesia Type:  General    Note:  Disposition: Inpatient   Postop Pain Control: Uneventful            Sign Out: Well controlled pain   PONV: No   Neuro/Psych: Uneventful            Sign Out: Acceptable/Baseline neuro status   Airway/Respiratory: Uneventful            Sign Out: Acceptable/Baseline resp. status   CV/Hemodynamics: Uneventful            Sign Out: Acceptable CV status; No obvious hypovolemia; No obvious fluid overload   Other NRE: NONE   DID A NON-ROUTINE EVENT OCCUR?            Last vitals:  Vitals Value Taken Time   /83 11/30/22 1215   Temp     Pulse 72 11/30/22 1216   Resp 8 11/30/22 1216   SpO2 83 % 11/30/22 1221   Vitals shown include unvalidated device data.    Electronically Signed By: Myrtle Amin  November 30, 2022  2:07 PM

## 2022-11-30 NOTE — BRIEF OP NOTE
[facility]    Brief Operative Note    Pre-operative diagnosis: S/P Right total hip infection, explant.  Post-operative diagnosis same    Procedure: Procedure(s):  RIGHT HIP EXPLANT ANTIBIOTIC SPACER; RIGHT TOTAL HIP ARTHROPLASTY REIMPLANTATION  Surgeon: Surgeon(s) and Role:     * Eric Almaraz MD - Primary     * Medardo Ulloa - Assisting     * Deborah Tim MD - Assisting  Anesthesia: General    Estimated blood loss: 400 ml  Drains:  None  Specimens: None  Findings:  Advanced OA  Complications: None    Plan: DC home POD1 w/family assist.  DVT prophylaxis w/ASA 325mg QD x6wks.    Specimens:   ID Type Source Tests Collected by Time Destination   A : RIGHT HIP SWAB #1 Aspirate Hip, Right ANAEROBIC BACTERIAL CULTURE ROUTINE, AEROBIC BACTERIAL CULTURE ROUTINE Eric Almaraz MD 11/30/2022  8:57 AM    B : RIGHT HIP SWAB #2 Aspirate Hip, Right ANAEROBIC BACTERIAL CULTURE ROUTINE, AEROBIC BACTERIAL CULTURE ROUTINE Eric Almaraz MD 11/30/2022 10:13 AM      Implants:   Implant Name Type Inv. Item Serial No.  Lot No. LRB No. Used Action   BONE CHIP CANCELLOUS 15CC [id number] - [id number] Bone/Tissue/Biologic BONE CHIP CANCELLOUS 15 [id number] [employer]  Right 1 Implanted   SHELL PINNACLE MULTIHOLE W/GRIPTION 64MM - [id number] Total Joint Component/Insert SHELL PINNACLE MULTIHOLE W/GRIPTION 64MM  [facility] [facility]- [id number] Right 1 Implanted   IMP SCR BONE CAN ACE 6.5X25MM 1217- - [id number] Metallic Hardware/[place] IMP SCR BONE CAN ACE 6.5X25MM 1217-  [employer] [facility]- [id number] Right 1 Implanted   IMP SCR BONE CAN ACE 6.5X20MM 1217- - [id number] Metallic Hardware/[place] IMP SCR BONE CAN ACE 6.5X20MM 1217-  [employer]- [id number] Right 1 Implanted   IMP SCR BONE CAN ACE 6.5X25MM 1217- - [id number] Metallic Hardware/[place] IMP SCR BONE CAN ACE 6.5X25MM 1217-  [employer]- [id number] Right 1  Implanted   IMP APEX HOLE ELIMINATOR HIP DEPUY DURALOC 1246- - [id number] Metallic Hardware/[place] IMP APEX HOLE ELIMINATOR HIP DEPUY DURALOC 1246-  &[facility] INC- [id number] Right 1 Implanted   LINER ACETABULAR ALTRX +4 NEUT [id number] - [id number] Total Joint Component/Insert LINER ACETABULAR ALTRX +4 NEUT [id number]  [facility] [employer]- [id number] Right 1 Implanted   LINER ACETABULAR ALTRX +4 NEUT [id number] - [id number] Total Joint Component/Insert LINER ACETABULAR ALTRX +4 NEUT [id number]  &[facility]ARE INC- [id number] Right 1 Implanted   IMP HEAD FEMORAL DEPUY CERAMIC 36MM +5MM [id number] - [id number] Total Joint Component/Insert IMP HEAD FEMORAL DEPUY CERAMIC 36MM +5MM [id number]  &[facility] [employer]- [id number] Right 1 Implanted   IMP STEM CORAIL REVISION STD SZ 13 V10166 - [id number] Total Joint Component/Insert IMP STEM CORAIL REVISION STD SZ 13 [id number]  &[facility] [employer]- [id number] Right 1 Implanted   IMP HEAD FEMORAL DEPUY PROSTALAC 32MM +13 BLK 1365- - [id number] Total Joint Component/Insert IMP HEAD FEMORAL DEPUY PROSTALAC 32MM +13 BLK 1365-  &[facility] [employer]- [id number] Right 1 Explanted   IMP CUP ACET DEPUY SPACER PROSTALAC [id number] 1541- - [id number] Total Joint Component/Insert IMP CUP ACET DEPUY SPACER PROSTALAC [id number] 1541-  &[facility] [employer]- [id number] Right 1 Explanted   STEM FEMORAL PROSTALAC STD 105MM SZ 1 - [id number] Total Joint Component/Insert STEM FEMORAL PROSTALAC STD 105MM SZ 1  [employer]- [id number] Right 1 Explanted

## 2022-11-30 NOTE — BRIEF OP NOTE
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, subsequent encounter [T84.51XD]  Post-operative diagnosis Same as pre-operative diagnosis    Procedure: Procedure(s):  RIGHT HIP EXPLANT ANTIBIOTIC SPACER; RIGHT TOTAL HIP ARTHROPLASTY REIMPLANTATION  Surgeon: Surgeon(s) and Role:     * Eric Almaraz MD - Primary     * Medardo Ulloa - Assisting     * Deborah Tim MD - Assisting  Anesthesia: General   Estimated Blood Loss: 400 mL from 11/30/2022  7:42 AM to 11/30/2022 11:14 AM      Drains: None  Specimens:   ID Type Source Tests Collected by Time Destination   A : RIGHT HIP SWAB #1 Aspirate Hip, Right ANAEROBIC BACTERIAL CULTURE ROUTINE, AEROBIC BACTERIAL CULTURE ROUTINE Eric Almaraz MD 11/30/2022  8:57 AM    B : RIGHT HIP SWAB #2 Aspirate Hip, Right ANAEROBIC BACTERIAL CULTURE ROUTINE, AEROBIC BACTERIAL CULTURE ROUTINE Eric Almaraz MD 11/30/2022 10:13 AM      .  Implants:   Implant Name Type Inv. Item Serial No.  Lot No. LRB No. Used Action   BONE CHIP CANCELLOUS 15CC [id number] - [id number] Bone/Tissue/Biologic BONE CHIP CANCELLOUS 15 [id number] [employer]  Right 1 Implanted   SHELL PINNACLE MULTIHOLE W/GRIPTION 64MM - [id number] Total Joint Component/Insert SHELL PINNACLE MULTIHOLE W/GRIPTION 64MM  [facility] [facility] [facility]- [id number] Right 1 Implanted   IMP SCR BONE CAN ACE 6.5X25MM 1217- - [id number] Metallic Hardware/[place] IMP SCR BONE CAN ACE 6.5X25MM 1217-  [employer] [facility]- [id number] Right 1 Implanted   IMP SCR BONE CAN ACE 6.5X20MM 1217- - [id number] Metallic Hardware/[place] IMP SCR BONE CAN ACE 6.5X20MM 1217-  [employer]- [id number] Right 1 Implanted   IMP SCR BONE CAN ACE 6.5X25MM 1217- - [id number] Metallic Hardware/[place] IMP SCR BONE CAN ACE 6.5X25MM 1217-  [employer]-  [id number] Right 1 Implanted   IMP APEX HOLE ELIMINATOR HIP DEPUY DURALOC 1246- - [id number] Metallic Hardware/[place] IMP APEX HOLE ELIMINATOR HIP DEPUY DURALOC 1246-  &[facility] CARE INC- [id number] Right 1 Implanted   LINER ACETABULAR ALTRX +4 NEUT 70S84LT - [id number] Total Joint Component/Insert LINER ACETABULAR ALTRX +4 NEUT [id number]  &[facility] CARE [facility]- [id number] Right 1 Implanted   LINER ACETABULAR ALTRX +4 NEUT 08N56BF - [id number] Total Joint Component/Insert LINER ACETABULAR ALTRX +4 NEUT [id number]  &[facility] CARE INC- [id number] Right 1 Implanted   IMP HEAD FEMORAL DEPUY CERAMIC 36MM +5MM [id number] - [id number] Total Joint Component/Insert IMP HEAD FEMORAL DEPUY CERAMIC 36MM +5MM [id number]  &[facility]- [id number] Right 1 Implanted   IMP STEM CORAIL REVISION STD SZ 13 G96270 - [id number] Total Joint Component/Insert IMP STEM CORAIL REVISION STD SZ 13 [id number]  &[facility] CARE [facility]- [id number] Right 1 Implanted   IMP HEAD FEMORAL DEPUY PROSTALAC 32MM +13 BLK 1365- - [id number] Total Joint Component/Insert IMP HEAD FEMORAL DEPUY PROSTALAC 32MM +13 BLK 1365-  &[facility] CARE [facility]- [id number] Right 1 Explanted   IMP CUP ACET DEPUY SPACER PROSTALAC 82K55NE 1541- - [id number] Total Joint Component/Insert IMP CUP ACET DEPUY SPACER PROSTALAC [id number] 1541-  &[facility] CARE INC- [id number] Right 1 Explanted   STEM FEMORAL PROSTALAC STD 105MM SZ 1 - [id number] Total Joint Component/Insert STEM FEMORAL PROSTALAC STD 105MM SZ 1  &[facility] CARE INC- [id number] Right 1 Explanted

## 2022-12-01 ENCOUNTER — APPOINTMENT (OUTPATIENT)
Dept: PHYSICAL THERAPY | Facility: CLINIC | Age: 52
End: 2022-12-01
Attending: ORTHOPAEDIC SURGERY
Payer: COMMERCIAL

## 2022-12-01 LAB
GLUCOSE BLDC GLUCOMTR-MCNC: 135 MG/DL (ref 70–99)
HGB BLD-MCNC: 10.5 G/DL (ref 13.3–17.7)

## 2022-12-01 PROCEDURE — 97110 THERAPEUTIC EXERCISES: CPT | Mod: GP | Performed by: PHYSICAL THERAPY ASSISTANT

## 2022-12-01 PROCEDURE — 85018 HEMOGLOBIN: CPT | Performed by: ORTHOPAEDIC SURGERY

## 2022-12-01 PROCEDURE — 250N000013 HC RX MED GY IP 250 OP 250 PS 637: Performed by: ORTHOPAEDIC SURGERY

## 2022-12-01 PROCEDURE — 36415 COLL VENOUS BLD VENIPUNCTURE: CPT | Performed by: ORTHOPAEDIC SURGERY

## 2022-12-01 PROCEDURE — 120N000001 HC R&B MED SURG/OB

## 2022-12-01 PROCEDURE — 97530 THERAPEUTIC ACTIVITIES: CPT | Mod: GP | Performed by: PHYSICAL THERAPY ASSISTANT

## 2022-12-01 PROCEDURE — 97116 GAIT TRAINING THERAPY: CPT | Mod: GP | Performed by: PHYSICAL THERAPY ASSISTANT

## 2022-12-01 PROCEDURE — 250N000011 HC RX IP 250 OP 636: Performed by: ORTHOPAEDIC SURGERY

## 2022-12-01 RX ORDER — OXYCODONE HYDROCHLORIDE 5 MG/1
5-10 TABLET ORAL EVERY 4 HOURS PRN
Qty: 26 TABLET | Refills: 0 | Status: SHIPPED | OUTPATIENT
Start: 2022-12-01

## 2022-12-01 RX ORDER — CEPHALEXIN 500 MG/1
500 CAPSULE ORAL 4 TIMES DAILY
Qty: 120 CAPSULE | Refills: 0 | Status: SHIPPED | OUTPATIENT
Start: 2022-12-01 | End: 2022-12-31

## 2022-12-01 RX ORDER — CELECOXIB 200 MG/1
200 CAPSULE ORAL DAILY
Qty: 42 CAPSULE | Refills: 0 | Status: SHIPPED | OUTPATIENT
Start: 2022-12-01 | End: 2023-01-12

## 2022-12-01 RX ORDER — ACETAMINOPHEN 325 MG/1
975 TABLET ORAL EVERY 6 HOURS PRN
Qty: 100 TABLET | Refills: 0 | Status: SHIPPED | OUTPATIENT
Start: 2022-12-01

## 2022-12-01 RX ORDER — AMOXICILLIN 250 MG
1-2 CAPSULE ORAL 2 TIMES DAILY
Qty: 30 TABLET | Refills: 0 | Status: SHIPPED | OUTPATIENT
Start: 2022-12-01

## 2022-12-01 RX ADMIN — KETOROLAC TROMETHAMINE 15 MG: 15 INJECTION, SOLUTION INTRAMUSCULAR; INTRAVENOUS at 06:24

## 2022-12-01 RX ADMIN — KETOROLAC TROMETHAMINE 15 MG: 15 INJECTION, SOLUTION INTRAMUSCULAR; INTRAVENOUS at 12:54

## 2022-12-01 RX ADMIN — ASPIRIN 325 MG: 325 TABLET, COATED ORAL at 08:39

## 2022-12-01 RX ADMIN — ACETAMINOPHEN 975 MG: 325 TABLET, FILM COATED ORAL at 21:30

## 2022-12-01 RX ADMIN — MAGNESIUM HYDROXIDE 30 ML: 400 SUSPENSION ORAL at 16:10

## 2022-12-01 RX ADMIN — ACETAMINOPHEN 975 MG: 325 TABLET, FILM COATED ORAL at 06:24

## 2022-12-01 RX ADMIN — ACETAMINOPHEN 975 MG: 325 TABLET, FILM COATED ORAL at 13:47

## 2022-12-01 RX ADMIN — KETOROLAC TROMETHAMINE 15 MG: 15 INJECTION, SOLUTION INTRAMUSCULAR; INTRAVENOUS at 00:46

## 2022-12-01 RX ADMIN — SENNOSIDES AND DOCUSATE SODIUM 1 TABLET: 50; 8.6 TABLET ORAL at 21:30

## 2022-12-01 RX ADMIN — SENNOSIDES AND DOCUSATE SODIUM 1 TABLET: 50; 8.6 TABLET ORAL at 08:39

## 2022-12-01 RX ADMIN — CEFAZOLIN SODIUM 2 G: 2 INJECTION, SOLUTION INTRAVENOUS at 00:46

## 2022-12-01 RX ADMIN — POLYETHYLENE GLYCOL 3350 17 G: 17 POWDER, FOR SOLUTION ORAL at 08:39

## 2022-12-01 ASSESSMENT — ACTIVITIES OF DAILY LIVING (ADL)
ADLS_ACUITY_SCORE: 22
ADLS_ACUITY_SCORE: 24
ADLS_ACUITY_SCORE: 22
ADLS_ACUITY_SCORE: 24
ADLS_ACUITY_SCORE: 24
ADLS_ACUITY_SCORE: 22
ADLS_ACUITY_SCORE: 24
ADLS_ACUITY_SCORE: 22
ADLS_ACUITY_SCORE: 22

## 2022-12-01 NOTE — PROGRESS NOTES
"ORTHOPEDIC LOWER EXTREMITY PROGRESS NOTE    POD#1  Patient is a 52 year old male who underwent Procedure(s):  RIGHT HIP EXPLANT ANTIBIOTIC SPACER; RIGHT TOTAL HIP ARTHROPLASTY REIMPLANTATION on 2022. Pain is well controlled. Tolerating medication well, no nausea or vomiting.  We discussed the hip abduction brace that he will be wearing.  Voiding well, no BM yet.    Vitals:   Blood pressure 114/84, pulse 75, temperature 98.2  F (36.8  C), temperature source Oral, resp. rate 16, height 1.93 m (6' 4\"), weight 123 kg (271 lb 1.6 oz), SpO2 98 %.  Temp (24hrs), Av.9  F (36.6  C), Min:97.4  F (36.3  C), Max:98.2  F (36.8  C)      Drains: None    EXAM   The patient is awake and alert.  Calves are soft and non-tender.   Sensation is intact.  Dorsiflexion and plantar flexion is intact.  Foot warm with nl cap refill.  The incision is covered with bulky surgical dressing.     Labs:   Recent Labs   Lab Test 22  0817 22  0640 10/24/22  0835 21  0753 21   HGB 10.5* 12.4* 11.1*   < > 11.9*   INR  --   --   --   --  1.14    < > = values in this interval not displayed.     CX: NGTD  ASSESSMENT  S/p  RIGHT HIP EXPLANT ANTIBIOTIC SPACER; RIGHT TOTAL HIP ARTHROPLASTY REIMPLANTATION   PLAN  1. DVT prophylaxis: aspirin  2. Weight Bearing: WBAT (Weight bearing as tolerated).  Dr. Olivares would like him to be in a hip abduction brace, this is getting set up for him to get outpatient  3. Anticipated discharge date tomorrow . Discharge to Home with No Skilled Service.  4. Cont Pain Control Oxycodone, Tylenol and Celebrex   5. He will be going home with 30 days of PO Keflex 500 mg qid    Deidre Hess PA-C  [facility]        "

## 2022-12-01 NOTE — PLAN OF CARE
Goal Outcome Evaluation:  Trauma/Ortho/Medical (Choose one)  Ortho  Diagnosis: right SHERLY reimplantation after removal of antibiotic spacer  POD#: 1  Mental Status: A/Ox4  Activity/dangle SBA/GB/walker  Diet: regular  Pain: scheduled Tylenol and IV Tordol  [place]/Voiding: urinal or bathroom  02/LDA: RA/ROSEMARY  D/C Date: possibly home later today vs tomorrow  Other Info: Patient needs hip abduction brace and will get in office after discharge.

## 2022-12-01 NOTE — PLAN OF CARE
[id number]. Pt progressing per plan of care. Up with SBA with GB/Walker. Refused oxycodone, pain managed well with tylenol and toradol. C/O being constipated, administered MOM and made aware suppository being available if needed. Dressing on (R) Hip CDI. Discharging to home tomorrow.

## 2022-12-01 NOTE — PLAN OF CARE
Diagnosis: 11/30 R explant antibiotic spacer  -R SHERLY. A & O x 4.  VSS on RA.  SBA.  Voiding adequately.  BS+.  LS clear.  Weight bearing as tolerated on right hip.  Pain managed w/ scheduled toradol & tylenol.  Regular diet.  Discharge 12/2/22.  CTM.

## 2022-12-01 NOTE — PLAN OF CARE
Goal Outcome Evaluation:  Date/Time 11/30     Trauma/Ortho/Medical (Choose one) Ortho    Diagnosis: 11/30 R explant antibiotic spacer-R SHERLY  POD#: DOS  Mental Status:4  Activity/dangle:  Diet: Reg  Pain:   Regal[place]/Voiding:   Tele/Restraints/Iso: n/a  02/LDA: IVF  D/C Date: a couple of days  Other Info:

## 2022-12-01 NOTE — PLAN OF CARE
Occupational Therapy: Orders received. Chart reviewed and discussed with PT. Per PT, pt is moving well and no ADL deficits, appears to have no acute OT needs. Defer discharge recommendations to PT and ortho team.? Will complete orders.

## 2022-12-01 NOTE — OP NOTE
Procedure Date: 11/30/2022    PREOPERATIVE DIAGNOSIS:  Infected right total hip arthroplasty, status post removal of explant of components and placement of temporary antibiotic spacer.    POSTOPERATIVE DIAGNOSIS:  Infected right total hip arthroplasty, status post removal of explant of components and placement of temporary antibiotic spacer.    PROCEDURE PERFORMED:  Revision right total hip arthroplasty, removal of a temporary antibiotic spacer and impaction grafting of the acetabulum.    INDICATIONS FOR PROCEDURE:  The patient is a 52-year-old male who had a right total hip arthroplasty done and this was complicated by infection.  He ultimately had to have his components  removed and placement of temporary antibiotic spacer.  He then was treated with 6 weeks of IV antibiotics and after being off the IV antibiotics for 2 weeks  his infection indices remained low and he had an aspiration done with a negative Gram stain and no growth of the cultures.  The patient had been under the care of Dr. Wang and Dr. Wang asked me to be involved due to the revision nature and trying to deal with bone loss from previous surgeries and his infection.  I saw the patient and discussed the risks, benefits, and potential complications of the procedure.  The discussion included, but was not specific to infection, vascular, neurologic complications, DVT, septic and aseptic loosening, leg length inequality, instability of the hip, fracture, and the possible need for further revision surgery.  After this discussion, the patient wanted to proceed with surgery.    PRIMARY SURGEON:  Deborah Tim MD.    ASSISTANT:  Eric Wang MD.   Dr. Wang assisted me throughout the entire procedure.  His assistance was needed from the beginning of the case including positioning, all the way to closure.    SECOND ASSISTANT:  LISSETTE Batres.    DESCRIPTION OF PROCEDURE:  The patient was taken to the operating room and placed on the table in a  supine position.  After adequate induction of a general anesthetic, he was transferred to lateral position and held this way with a Yi hip cruz, held with the right hip up.  Axillary roll was placed.  Care was taken to pad all bony prominences.  The patient was given 3 g of Ancef for infection prophylaxis given 1 hour prior to incision.  We then performed sterile prep and drape of the right hip and right lower extremity.  After sterile prep and drape, the old incision was opened.  We proceeded with anterolateral approach to the hip.  I divided the tensor fascia in line with the fibers.  We then took down the anterior one-third of the abductors, tagged and retracted them medially for later repair.  We then did a complete capsulectomy.  In order to mobilize the femur,  we did a subperiosteal exposure of the proximal femur.  We were then able to dislocate the hip, remove the femoral head.  We then removed the femoral component, the Prostalac.  This was done without any bone loss.  We then carefully removed the cemented cup.  We then placed retractors anteriorly, superiorly and posteriorly to expose the acetabulum.  He had a cavitary defect that was contained posteriorly.  After reaming to 63 mm, we eventually put in a 64 mm cup.  Prior to placement of the cup, we did do an impaction grafting of the cavitary defect in the acetabulum using 30 mL of crushed crouton allograft.  Once the cup was fully seated, 3 screws were applied for additional fixation.  We then applied a manhole cover and a +4 neutral liner for a 64 mm cup and a 36 mm head.  We then directed our attention to the femur through a series of reaming and broaching.  We prepared the canal for the revision Corail stem.  Trial stem was placed.  We reduced the hip, we found the hip to be very stable in full extension, external rotation, flexion, adduction, internal rotation with restoration of leg length and offset.  We then obtained intraoperative AP  pelvis x-ray, which showed ideal position of the cup and screws, good fit on the femoral side with restoration of leg length and offset.  We then dislocated the hip, removed the trial components, irrigated with pulse jet lavage and put down the actual stem.  Once the stem was fully seated, we applied the head and reduced the hip, again found the hip to be very stable with restoration of leg length and offset.  We then soaked the hip in a dilute solution of Betadine for 3 minutes and irrigated with pulse jet lavage, used approximately 3 liters of antibiotic saline and pulse jet lavage.  We then repaired the abductors using #2 Ethibond sutures.  Fascial layer was closed using 0 Ethibond oversewn using 0 Stratafix.  Deep subQ was closed using 0 Vicryl, superficial subQ was closed with 2-0 Vicryl and skin was closed with a running 3-0 Monocryl subcuticular stitch followed by Prineo dressing.  Sterile dressing applied followed by abduction pillow.  The patient tolerated the procedure.  There were no intraoperative complications.  The patient was sent to [facility] in stable condition.    Plan will be for patient get 24 hours of Ancef for infection prophylaxis, 30 days of aspirin for DVT prophylaxis.    Deborah Tim MD        D: 2022   T: 2022   MT: [facility]    Name:     CORONA PETERS  MRN:      [id number]        Account:        [id number]   :      1970           Procedure Date: 2022     Document: [id number]

## 2022-12-02 ENCOUNTER — APPOINTMENT (OUTPATIENT)
Dept: PHYSICAL THERAPY | Facility: CLINIC | Age: 52
End: 2022-12-02
Attending: ORTHOPAEDIC SURGERY
Payer: COMMERCIAL

## 2022-12-02 VITALS
WEIGHT: 271.1 LBS | BODY MASS INDEX: 33.01 KG/M2 | HEIGHT: 76 IN | RESPIRATION RATE: 18 BRPM | OXYGEN SATURATION: 93 % | DIASTOLIC BLOOD PRESSURE: 78 MMHG | HEART RATE: 83 BPM | TEMPERATURE: 97.3 F | SYSTOLIC BLOOD PRESSURE: 112 MMHG

## 2022-12-02 LAB
GLUCOSE BLD-MCNC: 122 MG/DL (ref 70–99)
HGB BLD-MCNC: 9.6 G/DL (ref 13.3–17.7)

## 2022-12-02 PROCEDURE — 82947 ASSAY GLUCOSE BLOOD QUANT: CPT | Performed by: ORTHOPAEDIC SURGERY

## 2022-12-02 PROCEDURE — 97110 THERAPEUTIC EXERCISES: CPT | Mod: GP | Performed by: PHYSICAL THERAPY ASSISTANT

## 2022-12-02 PROCEDURE — 85018 HEMOGLOBIN: CPT | Performed by: ORTHOPAEDIC SURGERY

## 2022-12-02 PROCEDURE — 250N000013 HC RX MED GY IP 250 OP 250 PS 637: Performed by: ORTHOPAEDIC SURGERY

## 2022-12-02 PROCEDURE — 97116 GAIT TRAINING THERAPY: CPT | Mod: GP | Performed by: PHYSICAL THERAPY ASSISTANT

## 2022-12-02 PROCEDURE — 36415 COLL VENOUS BLD VENIPUNCTURE: CPT | Performed by: ORTHOPAEDIC SURGERY

## 2022-12-02 RX ADMIN — ASPIRIN 325 MG: 325 TABLET, COATED ORAL at 08:10

## 2022-12-02 RX ADMIN — SENNOSIDES AND DOCUSATE SODIUM 1 TABLET: 50; 8.6 TABLET ORAL at 08:10

## 2022-12-02 RX ADMIN — BISACODYL 10 MG: 10 SUPPOSITORY RECTAL at 05:52

## 2022-12-02 RX ADMIN — ACETAMINOPHEN 975 MG: 325 TABLET, FILM COATED ORAL at 05:44

## 2022-12-02 ASSESSMENT — ACTIVITIES OF DAILY LIVING (ADL)
ADLS_ACUITY_SCORE: 24

## 2022-12-02 NOTE — PROGRESS NOTES
"ORTHOPEDIC LOWER EXTREMITY PROGRESS NOTE    POD#2  Patient is a 52 year old male who underwent Procedure(s):  RIGHT HIP EXPLANT ANTIBIOTIC SPACER; RIGHT TOTAL HIP ARTHROPLASTY REIMPLANTATION on 2022. Pain is well controlled. Tolerating medication well, no nausea or vomiting.  We discussed the hip abduction brace that he will be wearing.  Voiding well, no BM yet.  Discharge instructions reviewed.    Vitals:   Blood pressure 121/81, pulse 83, temperature 98  F (36.7  C), resp. rate 18, height 1.93 m (6' 4\"), weight 123 kg (271 lb 1.6 oz), SpO2 94 %.  Temp (24hrs), Av.9  F (36.6  C), Min:97.4  F (36.3  C), Max:98.2  F (36.8  C)      Drains: None    EXAM   The patient is awake and alert.  Calves are soft and non-tender.   Sensation is intact.  Dorsiflexion and plantar flexion is intact.  Foot warm with nl cap refill.  The incision is covered with bulky surgical dressing.     Labs:   Recent Labs   Lab Test 22  0817 22  0640 10/24/22  0835 21  0753 21   HGB 10.5* 12.4* 11.1*   < > 11.9*   INR  --   --   --   --  1.14    < > = values in this interval not displayed.     CX: NGTD  ASSESSMENT  S/p  RIGHT HIP EXPLANT ANTIBIOTIC SPACER; RIGHT TOTAL HIP ARTHROPLASTY REIMPLANTATION   PLAN  1. DVT prophylaxis: aspirin  2. Weight Bearing: WBAT (Weight bearing as tolerated).  Dr. Olivares would like him to be in a hip abduction brace, this is getting set up for him to get outpatient for today, his son with transport him there.  3. Anticipated discharge date today. Discharge to Home with No Skilled Service.  4. Cont Pain Control Oxycodone, Tylenol and Celebrex   5. He will be going home with 30 days of PO Keflex 500 mg qid    Kjerstin Foss PA-C [facility]          "

## 2022-12-02 NOTE — PROGRESS NOTES
Patient vital signs are at baseline: Yes  Patient able to ambulate as they were prior to admission or with assist devices provided by therapies during their stay:  Yes  Patient MUST void prior to discharge:  Yes  Patient able to tolerate oral intake:  Yes  Pain has adequate pain control using Oral analgesics:  Yes  Does patient have an identified :  Yes  Has goal D/C date and time been discussed with patient:  Yes   Alert and oriented x 4. VSS. CMS intact. Dressing to R hip intact. Ambulated with assist of one, GB/walker to BR. Voiding adequately. PRN Bisacodyl supp given, had a large BM after. Pt requesting to discharge at 1:30 PM as pt has a 2 PM  appt  at [facility].

## 2022-12-02 NOTE — PLAN OF CARE
Oriented x 4. C/O minimal pain. Up with SBA with GB/Walker. Dressing on (R) hip CDI. Discharge instructions reviewed with pt and all questions answered. Discharging to home with son via pvt ride. Meds and belongings sent along.

## 2022-12-04 NOTE — PLAN OF CARE
Physical Therapy Discharge Summary    Reason for therapy discharge:    Discharged to home.  All goals and outcomes met, no further needs identified.    Progress towards therapy goal(s). See goals on Care Plan in [facility] electronic health record for goal details.  Goals met    Therapy recommendation(s):    Continue home exercise program.  Pt is IND-SBA with mobility.  Pt will have assist of family as needed.  PT Brief overview of current status: IND-SBA with transfers and gait.

## 2022-12-05 LAB
BACTERIA TISS BX CULT: NO GROWTH
BACTERIA TISS BX CULT: NO GROWTH

## 2022-12-07 LAB
BACTERIA TISS BX CULT: NORMAL
BACTERIA TISS BX CULT: NORMAL

## 2023-01-03 NOTE — DISCHARGE SUMMARY
Orthopedic Discharge Summary   Patient: Aba Gan  Admission Date: 11/30/2022  Discharge Date: 12/1/2022  Date of Service: 1/3/2023  Attending Provider: No att. 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     Status post revision of total hip replacement     Past Surgical History:   Procedure Laterality Date     ARTHROPLASTY REVISION HIP Right 11/30/2022    Procedure: RIGHT HIP EXPLANT ANTIBIOTIC SPACER; RIGHT TOTAL HIP ARTHROPLASTY REIMPLANTATION;  Surgeon: Eric Almaraz MD;  Location:  OR     ARTHROPLASTY REVISION HIP ANTERIOR Right 12/27/2021    Procedure: RIGHT HIP IRRIGATION AND DEBRIDEMENT WITH POLY AND HEAD EXCHANGE.;  Surgeon: Eric Almaraz MD;  Location:  OR     AS TOTAL HIP ARTHROPLASTY Right      COLONOSCOPY       GENITOURINARY SURGERY       IRRIGATION AND DEBRIDEMENT HIP, PLACE ANTIBIOTIC CEMENT BEADS, COMBINED Right 9/19/2022    Procedure: IRRIGATION AND DEBRIDEMENT, PLACEMENT OF ANTIBIOTIC SPACER;  Surgeon: Eric Almaraz MD;  Location:  OR     KNEE ARTHROSCOPY AND ARTHROTOMY       REMOVE HARDWARE ARTHROPLASTY HIP Right 9/19/2022    Procedure: RIGHT HIP EXPLANT,;  Surgeon: Eric Almaraz MD;  Location:  OR     TONSILLECTOMY      AS A CHILD      VASECTOMY       Social History     Socioeconomic History     Marital status:      Spouse name: Not on file     Number of children: Not on file     Years of education: Not on file     Highest education level: Not on file   Occupational History     Not on file   Tobacco Use     Smoking status: Never     Smokeless tobacco: Never   Vaping Use     Vaping Use: Never used   Substance and Sexual Activity     Alcohol use: Yes     Comment: 6 DRINKS PER WEEK     Drug use: Never     Sexual activity: Yes     Partners: Female   Other Topics Concern     Parent/sibling w/ CABG, MI or angioplasty before 65F 55M? Not Asked   Social History Narrative     Not on file     Social Determinants of Health     Financial Resource Strain: Not on file   Food Insecurity: Not on file   Transportation Needs: Not on file   Physical Activity: Not on file   Stress: Not on file   Social Connections: Not on file   Intimate Partner Violence: Not on file   Housing Stability: Not on file     Medications on admission:   No medications prior to admission.     Allergies:  No Known Allergies    Hospital Course: Patient was brought to the OR on where he underwent the above named procedure. Postoperatively he did very well with no apparent complications, and he had an uneventful hospital stay. Antibiotics were given for 24 hours after surgery. He was given Aspirin twice daily for DVT prophylaxis and his pain was well controlled with oral meds, then transitioned to oral pain medications during his hospital stay. He progressed well with physical therapy and discharge to home was recommended. His chronic medical problems were followed by the medicine team during his hospital stay and there were no significant changes to conditions or treatment plans. No new medical problems presented during his hospital stay.   Consultations Obtained During Hospitalization:  1. Internal medicine for management of comorbid conditions and home medication management.  2.  Physical Therapy for gait training, mobilization, range of motion and strengthening exercises  3. Occupational Therapy for activities of daily living.  4. Social Work for disposition planning.  Principal Problem:    Status post revision of total hip replacement    Discharge Disposition: patient improving  Discharge Diet: resume normal pre op diet  Discharge Medications:   Discharge Medication List as of 12/2/2022 12:33 PM      START taking these medications    Details   !! acetaminophen (TYLENOL) 325 MG tablet Take 3 tablets (975 mg) by mouth every 6 hours as needed for other (mild pain), Disp-100 tablet, R-0, E-Prescribe      celecoxib (CELEBREX) 200 MG capsule Take 1 capsule (200 mg) by mouth daily for 42 days Do not take within 6 hours of ibuprofen (MOTRIN, ADVIL) or ketorolac (TORADOL) if prescribed., Disp-42 capsule, R-0, E-Prescribe      cephALEXin (KEFLEX) 500 MG capsule Take 1 capsule (500 mg) by mouth 4 times daily for 30 days, Disp-120 capsule, R-0, E-Prescribe      oxyCODONE (ROXICODONE) 5 MG tablet Take 1-2 tablets (5-10 mg) by mouth every 4 hours as needed for moderate to severe pain, Disp-26 tablet, R-0, E-Prescribe      senna-docusate (SENOKOT-S/PERICOLACE) 8.6-50 MG tablet Take 1-2 tablets by mouth 2 times daily Take while on oral narcotics to prevent or treat constipation., Disp-30 tablet, R-0, E-PrescribeWhile taking narcotics       !! - Potential duplicate medications found. Please discuss with provider.      CONTINUE these medications which have CHANGED    Details   aspirin (ASA) 325 MG EC tablet Take 1 tablet (325 mg) by mouth daily for 42 days, Disp-42 tablet, R-0, E-Prescribe         CONTINUE these medications which have NOT CHANGED    Details   !! acetaminophen (TYLENOL) 325 MG tablet Take 3 tablets (975 mg) by mouth every 6 hours as needed for other (mild pain), Disp-100 tablet, R-0, E-Prescribe      amoxicillin (AMOXIL) 500 MG capsule Take 2,000 mg by mouth once as needed (1 HOUR PRIOR TO  DENTAL VISIT) (500MG X 4 = 2,000MG), Historical      atorvastatin (LIPITOR) 40 MG tablet Take 40 mg by mouth daily, Historical      fish oil-omega-3 fatty acids 1000 MG capsule Take 2,000 mg by mouth daily (1G X 2 = 2G), Historical       !! - Potential duplicate medications found. Please discuss with provider.        Code Status:   X-rays needed at the follow up visit:   Sean Youssef PA-C  1/3/2023 4:15 PM   [phone]

## 2023-05-20 ENCOUNTER — HEALTH MAINTENANCE LETTER (OUTPATIENT)
Age: 53
End: 2023-05-20

## 2023-10-03 NOTE — ANESTHESIA PREPROCEDURE EVALUATION
Anesthesia Pre-Procedure Evaluation    Patient: Aba Gan   MRN: [id number] : 1970        Procedure : Procedure(s):  RIGHT HIP EXPLANT,  IRRIGATION AND DEBRIDEMENT, PLACEMENT OF ANTIBIOTIC SPACER          Past Medical History:   Diagnosis Date     Blood in urine     SINCE .  ALWAYS HAS A SMALL AMOUNT OF rbc'S IN URINE     Hyperlipidemia      Obese      Plantar fasciitis      Sleep apnea     DOESN'T USE CPAP      Past Surgical History:   Procedure Laterality Date     ARTHROPLASTY REVISION HIP ANTERIOR Right 2021    Procedure: RIGHT HIP IRRIGATION AND DEBRIDEMENT WITH POLY AND HEAD EXCHANGE.;  Surgeon: Eric Almaraz MD;  Location: [facility] OR     AS TOTAL HIP ARTHROPLASTY Right      COLONOSCOPY       GENITOURINARY SURGERY       KNEE ARTHROSCOPY AND ARTHROTOMY       TONSILLECTOMY      AS A CHILD     VASECTOMY        No Known Allergies   Social History     Tobacco Use     Smoking status: Never Smoker     Smokeless tobacco: Never Used   Substance Use Topics     Alcohol use: Yes     Comment: 6 DRINKS PER WEEK      Wt Readings from Last 1 Encounters:   22 123.5 kg (272 lb 4.8 oz)        Anesthesia Evaluation   Pt has had prior anesthetic. Type: General.    No history of anesthetic complications       ROS/MED HX  ENT/Pulmonary:     (+) sleep apnea, doesn't use CPAP,  (-) tobacco use   Neurologic:       Cardiovascular:     (+) Dyslipidemia -----Previous cardiac testing   Echo: Date: Results:    Stress Test: Date: Results:    ECG Reviewed: Date: 21 Results:  SR w/ RBBB  Cath: Date: Results:      METS/Exercise Tolerance:     Hematologic:       Musculoskeletal:       GI/Hepatic:       Renal/Genitourinary: Comment: Blood in urine, chronic, mild      Endo:     (+) Obesity (Class 1),     Psychiatric/Substance Use:       Infectious Disease:       Malignancy:       Other:            Physical Exam    Airway        Mallampati: II   TM distance: > 3 FB   Neck ROM: full   Mouth opening: > 3 cm    Respiratory  Devices and Support         Dental       (+) chipped and caps      Cardiovascular          Rhythm and rate: regular and normal     Pulmonary           breath sounds clear to auscultation           OUTSIDE LABS:  CBC:   Lab Results   Component Value Date    WBC 4.9 02/08/2022    WBC 5.8 02/01/2022    HGB 13.2 (L) 02/08/2022    HGB 12.5 (L) 02/01/2022    HCT 40.8 02/08/2022    HCT 37.7 (L) 02/01/2022     02/08/2022     02/01/2022     BMP:   Lab Results   Component Value Date     12/26/2021    POTASSIUM 4.0 12/26/2021    CHLORIDE 106 12/26/2021    CO2 25 12/26/2021    BUN 11 02/08/2022    BUN 11 02/01/2022    CR 0.63 (L) 02/08/2022    CR 0.61 (L) 02/01/2022     (H) 12/29/2021     (H) 12/28/2021     COAGS:   Lab Results   Component Value Date    INR 1.14 12/26/2021     POC: No results found for: BGM, HCG, HCGS  HEPATIC:   Lab Results   Component Value Date    ALBUMIN 2.6 (L) 12/29/2021    PROTTOTAL 6.7 (L) 12/29/2021    ALT 29 02/01/2022    AST 14 02/08/2022    ALKPHOS 66 12/29/2021    BILITOTAL 0.5 12/29/2021     OTHER:   Lab Results   Component Value Date    LACT 1.1 12/26/2021    SCOUT 8.5 12/26/2021    CRP 13.69 (H) 09/12/2022    SED 72 (H) 09/12/2022       Anesthesia Plan    ASA Status:  2   NPO Status:  NPO Appropriate    Anesthesia Type: General.     - Airway: ETT   Induction: Intravenous.   Maintenance: Balanced.   Techniques and Equipment:     - Airway: Video-Laryngoscope         Consents    Anesthesia Plan(s) and associated risks, benefits, and realistic alternatives discussed. Questions answered and patient/representative(s) expressed understanding.    - Discussed:     - Discussed with:  Patient         Postoperative Care    Pain management: IV analgesics, Oral pain medications, Multi-modal analgesia.   PONV prophylaxis: Ondansetron (or other 5HT-3), Dexamethasone or Solumedrol     Comments:                Edwin Franco MD   Type Of Destruction Used: Curettage

## 2024-07-27 ENCOUNTER — HEALTH MAINTENANCE LETTER (OUTPATIENT)
Age: 54
End: 2024-07-27

## 2025-08-10 ENCOUNTER — HEALTH MAINTENANCE LETTER (OUTPATIENT)
Age: 55
End: 2025-08-10

## (undated) DEVICE — SOL NACL 0.9% INJ 250ML BAG 2B1322Q

## (undated) DEVICE — LINEN TOWEL PACK X5 5464

## (undated) DEVICE — SU PDS II 2-0 CT-1 27" Z339H

## (undated) DEVICE — SU MONOCRYL 3-0 PS-2 27" Y427H

## (undated) DEVICE — SU STRATAFIX PDS PLUS 0 CT 45CM SXPP1A406

## (undated) DEVICE — SU MONOCRYL 2-0 CT-1 36" UND Y945H

## (undated) DEVICE — PACK TOTAL HIP W/POUCH SOP15HPFSM

## (undated) DEVICE — CAST PADDING 4" STERILE 9044S

## (undated) DEVICE — Device

## (undated) DEVICE — SYR 50ML LL W/O NDL 309653

## (undated) DEVICE — DRAPE IOBAN INCISE 23X17" 6650EZ

## (undated) DEVICE — GLOVE PROTEXIS W/NEU-THERA 8.0  2D73TE80

## (undated) DEVICE — GLOVE PROTEXIS W/NEU-THERA 6.5  2D73TE65

## (undated) DEVICE — GLOVE PROTEXIS BLUE W/NEU-THERA 8.5  2D73EB85

## (undated) DEVICE — SUCTION IRR SYSTEM W/O TIP INTERPULSE HANDPIECE 0210-100-000

## (undated) DEVICE — DRAPE CONVERTORS U-DRAPE 60X72" 8476

## (undated) DEVICE — PREP CHLORAPREP 26ML TINTED HI-LITE ORANGE 930815

## (undated) DEVICE — SUCTION TIP YANKAUER STR K87

## (undated) DEVICE — MANIFOLD NEPTUNE 4 PORT 700-20

## (undated) DEVICE — ESU GROUND PAD UNIVERSAL W/O CORD

## (undated) DEVICE — GOWN IMPERVIOUS SPECIALTY XLG/XLONG 32474

## (undated) DEVICE — DRAPE C-ARM 60X42" 1013

## (undated) DEVICE — SU ETHILON 2-0 FS 18" 664H

## (undated) DEVICE — SU VICRYL 0 CTX CR 8X18" J764D

## (undated) DEVICE — ESU ELEC BLADE 6" COATED E1450-6

## (undated) DEVICE — GLOVE PROTEXIS W/NEU-THERA 6.0  2D73TE60

## (undated) DEVICE — GLOVE BIOGEL PI SZ 8.5 40885

## (undated) DEVICE — PACK TOTAL HIP W/U DRAPE SOP15HUFSC

## (undated) DEVICE — SU STRATAFIX PDS PLUS 0 CT-1 30CM SXPP1B450

## (undated) DEVICE — SU VICRYL 2-0 CP-1 27" UND J266H

## (undated) DEVICE — HOOD FLYTE W/PEELAWAY 408-800-100

## (undated) DEVICE — GLOVE PROTEXIS BLUE W/NEU-THERA 7.5  2D73EB75

## (undated) DEVICE — SOL NACL 0.9% IRRIG 1000ML BOTTLE 2F7124

## (undated) DEVICE — SOL WATER IRRIG 1000ML BOTTLE 2F7114

## (undated) DEVICE — PREP CHLORAPREP 26ML TINTED ORANGE  260815

## (undated) DEVICE — BONE CLEANING TIP INTERPULSE  0210-010-000

## (undated) DEVICE — DRAPE IOBAN ISOLATION VERTICAL 320X21CM 6617

## (undated) DEVICE — GLOVE PROTEXIS POWDER FREE 8.5 ORTHOPEDIC 2D73ET85

## (undated) DEVICE — GLOVE PROTEXIS POWDER FREE 6.5 ORTHOPEDIC 2D73ET65

## (undated) DEVICE — SU ETHILON 3-0 FS-1 18" 669H

## (undated) DEVICE — ESU BIPOLAR SEALER AQUAMANTYS 6MM 23-112-1

## (undated) DEVICE — DRSG PREVENA NEGATIVE PRESSURE 20CM WND PRE1001US

## (undated) DEVICE — PAD FOAM MCGUIRE KIT 0814-0150

## (undated) DEVICE — BLADE CLIPPER 4412A

## (undated) DEVICE — GLOVE PROTEXIS BLUE W/NEU-THERA 6.5  2D73EB65

## (undated) DEVICE — SOL NACL 0.9% IRRIG 3000ML BAG 2B7477

## (undated) DEVICE — GLOVE PROTEXIS MICRO 6.5  2D73PM65

## (undated) DEVICE — ROD SYN REAMER BALL TIP 2.5X950MM  351.706S

## (undated) DEVICE — DRAPE SHEET REV FOLD 3/4 9349

## (undated) DEVICE — BLADE CLIPPER SGL USE 9680

## (undated) DEVICE — CLOSURE SYS SKIN PREMIERPRO EXOFIN FUSION 4X22CM STRL 3472

## (undated) DEVICE — DRSG ADAPTIC 3X8" 6113

## (undated) DEVICE — SU DERMABOND PRINEO 22CM CLR222US

## (undated) DEVICE — ESU PENCIL W/SMOKE EVAC CVPLP2000

## (undated) DEVICE — SPONGE LAP 18X18" X8435

## (undated) DEVICE — DRAPE STERI U 1015

## (undated) DEVICE — BLADE SAW RECIP STRK LONG 70X12.5X0.9MM 0277-096-278

## (undated) DEVICE — GLOVE BIOGEL PI MICRO INDICATOR UNDERGLOVE SZ 8.5 48985

## (undated) DEVICE — DRSG KERLIX FLUFFS X5

## (undated) DEVICE — TUBING SUCTION 12"X1/4" N612

## (undated) DEVICE — SPECIMEN CULTURETTE DBL SWAB 220109

## (undated) DEVICE — SOLUTION WOUND CLEANSING 3/4OZ 10% PVP EA-L3011FB-50

## (undated) DEVICE — BONE CLEANING TIP INTERPULSE FEMORAL CANAL 0210-008-000

## (undated) DEVICE — SOL NACL 0.9% INJ 1000ML BAG 2B1324X

## (undated) DEVICE — SU ETHIBOND 2 V-37 4X30" MX69G

## (undated) DEVICE — SU STRATAFIX PDS PLUS 2-0 SPIRAL CT-1 30CM SXPP1B410

## (undated) DEVICE — ADH REMOVER PAD UNI-SOLVE 402300

## (undated) DEVICE — GLOVE PROTEXIS W/NEU-THERA 8.5  2D73TE85

## (undated) DEVICE — SU VICRYL 0 CT-1 27" J340H

## (undated) DEVICE — GLOVE PROTEXIS POWDER FREE 7.0 ORTHOPEDIC 2D73ET70

## (undated) DEVICE — NDL 20GA 1.5"

## (undated) DEVICE — SU STRATAFIX PDS PLUS 1 CT-1 18" SXPP1A404

## (undated) DEVICE — IMM PILLOW ABDUCT HIP MED 31143061

## (undated) DEVICE — PUMP UNIT NEGATIVE PRESSURE PREVENA PLUS PRE4010.S

## (undated) DEVICE — SU ETHIBOND #5 GS-18 B409T

## (undated) DEVICE — KIT PATIENT CARE HANA TABLE PROFX SUPINE 6855

## (undated) DEVICE — SU STRATAFIX PDS PLUS 1 SPIRAL CTX 70CM SXPP1B101

## (undated) RX ORDER — LIDOCAINE HYDROCHLORIDE 20 MG/ML
INJECTION, SOLUTION EPIDURAL; INFILTRATION; INTRACAUDAL; PERINEURAL
Status: DISPENSED
Start: 2022-09-19

## (undated) RX ORDER — PROPOFOL 10 MG/ML
INJECTION, EMULSION INTRAVENOUS
Status: DISPENSED
Start: 2021-12-27

## (undated) RX ORDER — TOBRAMYCIN 1.2 G/30ML
INJECTION, POWDER, LYOPHILIZED, FOR SOLUTION INTRAVENOUS
Status: DISPENSED
Start: 2022-09-19

## (undated) RX ORDER — HYDROMORPHONE HYDROCHLORIDE 1 MG/ML
INJECTION, SOLUTION INTRAMUSCULAR; INTRAVENOUS; SUBCUTANEOUS
Status: DISPENSED
Start: 2021-12-27

## (undated) RX ORDER — ONDANSETRON 2 MG/ML
INJECTION INTRAMUSCULAR; INTRAVENOUS
Status: DISPENSED
Start: 2022-09-19

## (undated) RX ORDER — CEFAZOLIN SODIUM/WATER 3 G/30 ML
SYRINGE (ML) INTRAVENOUS
Status: DISPENSED
Start: 2022-09-19

## (undated) RX ORDER — NEOSTIGMINE METHYLSULFATE 1 MG/ML
VIAL (ML) INJECTION
Status: DISPENSED
Start: 2021-12-27

## (undated) RX ORDER — NEOSTIGMINE METHYLSULFATE 1 MG/ML
VIAL (ML) INJECTION
Status: DISPENSED
Start: 2022-11-30

## (undated) RX ORDER — VANCOMYCIN HYDROCHLORIDE 1 G/20ML
INJECTION, POWDER, LYOPHILIZED, FOR SOLUTION INTRAVENOUS
Status: DISPENSED
Start: 2022-11-30

## (undated) RX ORDER — VANCOMYCIN HYDROCHLORIDE 1 G/20ML
INJECTION, POWDER, LYOPHILIZED, FOR SOLUTION INTRAVENOUS
Status: DISPENSED
Start: 2022-09-19

## (undated) RX ORDER — CEFAZOLIN SODIUM IN 0.9 % NACL 3 G/100 ML
INTRAVENOUS SOLUTION, PIGGYBACK (ML) INTRAVENOUS
Status: DISPENSED
Start: 2021-12-27

## (undated) RX ORDER — FENTANYL CITRATE 50 UG/ML
INJECTION, SOLUTION INTRAMUSCULAR; INTRAVENOUS
Status: DISPENSED
Start: 2021-12-27

## (undated) RX ORDER — PROPOFOL 10 MG/ML
INJECTION, EMULSION INTRAVENOUS
Status: DISPENSED
Start: 2022-09-19

## (undated) RX ORDER — TRANEXAMIC ACID 10 MG/ML
INJECTION, SOLUTION INTRAVENOUS
Status: DISPENSED
Start: 2021-12-27

## (undated) RX ORDER — FENTANYL CITRATE 50 UG/ML
INJECTION, SOLUTION INTRAMUSCULAR; INTRAVENOUS
Status: DISPENSED
Start: 2022-09-19

## (undated) RX ORDER — DEXAMETHASONE SODIUM PHOSPHATE 4 MG/ML
INJECTION, SOLUTION INTRA-ARTICULAR; INTRALESIONAL; INTRAMUSCULAR; INTRAVENOUS; SOFT TISSUE
Status: DISPENSED
Start: 2022-09-19

## (undated) RX ORDER — HYDROMORPHONE HYDROCHLORIDE 1 MG/ML
INJECTION, SOLUTION INTRAMUSCULAR; INTRAVENOUS; SUBCUTANEOUS
Status: DISPENSED
Start: 2022-09-19

## (undated) RX ORDER — PROPOFOL 10 MG/ML
INJECTION, EMULSION INTRAVENOUS
Status: DISPENSED
Start: 2022-11-30

## (undated) RX ORDER — LIDOCAINE HYDROCHLORIDE 10 MG/ML
INJECTION, SOLUTION EPIDURAL; INFILTRATION; INTRACAUDAL; PERINEURAL
Status: DISPENSED
Start: 2021-12-27

## (undated) RX ORDER — TRANEXAMIC ACID 650 MG/1
TABLET ORAL
Status: DISPENSED
Start: 2022-09-19

## (undated) RX ORDER — TRANEXAMIC ACID 650 MG/1
TABLET ORAL
Status: DISPENSED
Start: 2022-11-30

## (undated) RX ORDER — ONDANSETRON 2 MG/ML
INJECTION INTRAMUSCULAR; INTRAVENOUS
Status: DISPENSED
Start: 2022-11-30

## (undated) RX ORDER — GLYCOPYRROLATE 0.2 MG/ML
INJECTION, SOLUTION INTRAMUSCULAR; INTRAVENOUS
Status: DISPENSED
Start: 2021-12-27

## (undated) RX ORDER — CELECOXIB 200 MG/1
CAPSULE ORAL
Status: DISPENSED
Start: 2021-12-27

## (undated) RX ORDER — FENTANYL CITRATE 50 UG/ML
INJECTION, SOLUTION INTRAMUSCULAR; INTRAVENOUS
Status: DISPENSED
Start: 2022-11-30

## (undated) RX ORDER — EPINEPHRINE 1 MG/ML
INJECTION, SOLUTION INTRAMUSCULAR; SUBCUTANEOUS
Status: DISPENSED
Start: 2022-11-30

## (undated) RX ORDER — CELECOXIB 200 MG/1
CAPSULE ORAL
Status: DISPENSED
Start: 2022-09-19

## (undated) RX ORDER — CEFAZOLIN SODIUM 1 G/3ML
INJECTION, POWDER, FOR SOLUTION INTRAMUSCULAR; INTRAVENOUS
Status: DISPENSED
Start: 2022-09-19

## (undated) RX ORDER — HYDROMORPHONE HYDROCHLORIDE 1 MG/ML
INJECTION, SOLUTION INTRAMUSCULAR; INTRAVENOUS; SUBCUTANEOUS
Status: DISPENSED
Start: 2022-11-30

## (undated) RX ORDER — KETOROLAC TROMETHAMINE 30 MG/ML
INJECTION, SOLUTION INTRAMUSCULAR; INTRAVENOUS
Status: DISPENSED
Start: 2022-09-19

## (undated) RX ORDER — PREGABALIN 150 MG/1
CAPSULE ORAL
Status: DISPENSED
Start: 2022-11-30

## (undated) RX ORDER — VANCOMYCIN HYDROCHLORIDE 1 G/20ML
INJECTION, POWDER, LYOPHILIZED, FOR SOLUTION INTRAVENOUS
Status: DISPENSED
Start: 2021-12-27

## (undated) RX ORDER — PREGABALIN 150 MG/1
CAPSULE ORAL
Status: DISPENSED
Start: 2022-09-19

## (undated) RX ORDER — GLYCOPYRROLATE 0.2 MG/ML
INJECTION, SOLUTION INTRAMUSCULAR; INTRAVENOUS
Status: DISPENSED
Start: 2022-11-30

## (undated) RX ORDER — LIDOCAINE HYDROCHLORIDE 10 MG/ML
INJECTION, SOLUTION EPIDURAL; INFILTRATION; INTRACAUDAL; PERINEURAL
Status: DISPENSED
Start: 2022-11-09

## (undated) RX ORDER — ACETAMINOPHEN 325 MG/1
TABLET ORAL
Status: DISPENSED
Start: 2022-09-19

## (undated) RX ORDER — CELECOXIB 200 MG/1
CAPSULE ORAL
Status: DISPENSED
Start: 2022-11-30

## (undated) RX ORDER — DEXAMETHASONE SODIUM PHOSPHATE 4 MG/ML
INJECTION, SOLUTION INTRA-ARTICULAR; INTRALESIONAL; INTRAMUSCULAR; INTRAVENOUS; SOFT TISSUE
Status: DISPENSED
Start: 2022-11-30

## (undated) RX ORDER — MINERAL OIL
OIL (ML) MISCELLANEOUS
Status: DISPENSED
Start: 2022-09-19

## (undated) RX ORDER — ACETAMINOPHEN 325 MG/1
TABLET ORAL
Status: DISPENSED
Start: 2022-11-30